# Patient Record
Sex: MALE | Race: WHITE | Employment: OTHER | URBAN - NONMETROPOLITAN AREA
[De-identification: names, ages, dates, MRNs, and addresses within clinical notes are randomized per-mention and may not be internally consistent; named-entity substitution may affect disease eponyms.]

---

## 2017-01-01 ENCOUNTER — APPOINTMENT (OUTPATIENT)
Dept: GENERAL RADIOLOGY | Facility: HOSPITAL | Age: 72
DRG: 602 | End: 2017-01-01
Attending: EMERGENCY MEDICINE
Payer: MEDICARE

## 2017-01-01 ENCOUNTER — HOSPITAL ENCOUNTER (INPATIENT)
Facility: HOSPITAL | Age: 72
LOS: 3 days | Discharge: HOME | DRG: 602 | End: 2017-12-19
Attending: EMERGENCY MEDICINE | Admitting: FAMILY MEDICINE
Payer: MEDICARE

## 2017-01-01 ENCOUNTER — TELEPHONE (OUTPATIENT)
Dept: CASE MANAGEMENT | Facility: HOSPITAL | Age: 72
End: 2017-01-01

## 2017-01-01 VITALS
SYSTOLIC BLOOD PRESSURE: 170 MMHG | HEIGHT: 71 IN | WEIGHT: 205 LBS | RESPIRATION RATE: 20 BRPM | DIASTOLIC BLOOD PRESSURE: 68 MMHG | TEMPERATURE: 98.6 F | BODY MASS INDEX: 28.7 KG/M2 | OXYGEN SATURATION: 91 %

## 2017-01-01 DIAGNOSIS — R41.0 DELIRIUM: ICD-10-CM

## 2017-01-01 DIAGNOSIS — I10 BENIGN ESSENTIAL HYPERTENSION: Primary | ICD-10-CM

## 2017-01-01 DIAGNOSIS — L03.116 CELLULITIS OF BOTH LOWER EXTREMITIES: ICD-10-CM

## 2017-01-01 DIAGNOSIS — G89.4 CHRONIC PAIN SYNDROME: ICD-10-CM

## 2017-01-01 DIAGNOSIS — L03.115 CELLULITIS OF BOTH LOWER EXTREMITIES: ICD-10-CM

## 2017-01-01 LAB
ALBUMIN SERPL-MCNC: 3.2 G/DL (ref 3.4–5)
ALBUMIN UR-MCNC: >600 MG/DL
ALP SERPL-CCNC: 96 U/L (ref 40–150)
ALT SERPL W P-5'-P-CCNC: 22 U/L (ref 0–70)
ANION GAP SERPL CALCULATED.3IONS-SCNC: 10 MMOL/L (ref 3–14)
ANION GAP SERPL CALCULATED.3IONS-SCNC: 12 MMOL/L (ref 3–14)
ANION GAP SERPL CALCULATED.3IONS-SCNC: 8 MMOL/L (ref 3–14)
APPEARANCE UR: CLEAR
AST SERPL W P-5'-P-CCNC: 20 U/L (ref 0–45)
BACTERIA #/AREA URNS HPF: ABNORMAL /HPF
BACTERIA SPEC CULT: NO GROWTH
BACTERIA SPEC CULT: NORMAL
BACTERIA SPEC CULT: NORMAL
BASOPHILS # BLD AUTO: 0.1 10E9/L (ref 0–0.2)
BASOPHILS NFR BLD AUTO: 0.5 %
BILIRUB SERPL-MCNC: 0.6 MG/DL (ref 0.2–1.3)
BILIRUB UR QL STRIP: ABNORMAL
BUN SERPL-MCNC: 20 MG/DL (ref 7–30)
BUN SERPL-MCNC: 27 MG/DL (ref 7–30)
BUN SERPL-MCNC: 27 MG/DL (ref 7–30)
CALCIUM SERPL-MCNC: 7.7 MG/DL (ref 8.5–10.1)
CALCIUM SERPL-MCNC: 8.1 MG/DL (ref 8.5–10.1)
CALCIUM SERPL-MCNC: 8.8 MG/DL (ref 8.5–10.1)
CHLORIDE SERPL-SCNC: 111 MMOL/L (ref 94–109)
CHLORIDE SERPL-SCNC: 111 MMOL/L (ref 94–109)
CHLORIDE SERPL-SCNC: 113 MMOL/L (ref 94–109)
CO2 SERPL-SCNC: 21 MMOL/L (ref 20–32)
CO2 SERPL-SCNC: 23 MMOL/L (ref 20–32)
CO2 SERPL-SCNC: 24 MMOL/L (ref 20–32)
COLOR UR AUTO: YELLOW
CREAT SERPL-MCNC: 0.94 MG/DL (ref 0.66–1.25)
CREAT SERPL-MCNC: 0.96 MG/DL (ref 0.66–1.25)
CREAT SERPL-MCNC: 1.06 MG/DL (ref 0.66–1.25)
CREAT SERPL-MCNC: 1.12 MG/DL (ref 0.66–1.25)
CRP SERPL-MCNC: 20.4 MG/L (ref 0–8)
DIFFERENTIAL METHOD BLD: ABNORMAL
EOSINOPHIL # BLD AUTO: 0 10E9/L (ref 0–0.7)
EOSINOPHIL NFR BLD AUTO: 0.2 %
ERYTHROCYTE [DISTWIDTH] IN BLOOD BY AUTOMATED COUNT: 15.8 % (ref 10–15)
ERYTHROCYTE [DISTWIDTH] IN BLOOD BY AUTOMATED COUNT: 15.9 % (ref 10–15)
ERYTHROCYTE [DISTWIDTH] IN BLOOD BY AUTOMATED COUNT: 16.3 % (ref 10–15)
ERYTHROCYTE [SEDIMENTATION RATE] IN BLOOD BY WESTERGREN METHOD: 11 MM/H (ref 0–20)
FLUAV+FLUBV AG SPEC QL: NEGATIVE
FLUAV+FLUBV AG SPEC QL: NEGATIVE
GFR SERPL CREATININE-BSD FRML MDRD: 64 ML/MIN/1.7M2
GFR SERPL CREATININE-BSD FRML MDRD: 68 ML/MIN/1.7M2
GFR SERPL CREATININE-BSD FRML MDRD: 77 ML/MIN/1.7M2
GFR SERPL CREATININE-BSD FRML MDRD: 79 ML/MIN/1.7M2
GLUCOSE SERPL-MCNC: 100 MG/DL (ref 70–99)
GLUCOSE SERPL-MCNC: 93 MG/DL (ref 70–99)
GLUCOSE SERPL-MCNC: 94 MG/DL (ref 70–99)
GLUCOSE UR STRIP-MCNC: NEGATIVE MG/DL
HCT VFR BLD AUTO: 37.4 % (ref 40–53)
HCT VFR BLD AUTO: 38.2 % (ref 40–53)
HCT VFR BLD AUTO: 44.8 % (ref 40–53)
HGB BLD-MCNC: 12.2 G/DL (ref 13.3–17.7)
HGB BLD-MCNC: 12.4 G/DL (ref 13.3–17.7)
HGB BLD-MCNC: 14.3 G/DL (ref 13.3–17.7)
HGB UR QL STRIP: ABNORMAL
IMM GRANULOCYTES # BLD: 0.1 10E9/L (ref 0–0.4)
IMM GRANULOCYTES NFR BLD: 0.4 %
INR PPP: 1.09 (ref 0.8–1.2)
INR PPP: 1.54 (ref 0.8–1.2)
INR PPP: 5.28 (ref 0.8–1.2)
INR PPP: 6.75 (ref 0.8–1.2)
KETONES UR STRIP-MCNC: 80 MG/DL
LACTATE SERPL-SCNC: 1.4 MMOL/L (ref 0.4–2)
LEUKOCYTE ESTERASE UR QL STRIP: NEGATIVE
LYMPHOCYTES # BLD AUTO: 1.3 10E9/L (ref 0.8–5.3)
LYMPHOCYTES NFR BLD AUTO: 8.8 %
Lab: NORMAL
Lab: NORMAL
MAGNESIUM SERPL-MCNC: 2.5 MG/DL (ref 1.6–2.3)
MCH RBC QN AUTO: 25.3 PG (ref 26.5–33)
MCH RBC QN AUTO: 25.5 PG (ref 26.5–33)
MCH RBC QN AUTO: 25.5 PG (ref 26.5–33)
MCHC RBC AUTO-ENTMCNC: 31.9 G/DL (ref 31.5–36.5)
MCHC RBC AUTO-ENTMCNC: 32.5 G/DL (ref 31.5–36.5)
MCHC RBC AUTO-ENTMCNC: 32.6 G/DL (ref 31.5–36.5)
MCV RBC AUTO: 78 FL (ref 78–100)
MCV RBC AUTO: 79 FL (ref 78–100)
MCV RBC AUTO: 79 FL (ref 78–100)
MONOCYTES # BLD AUTO: 1.4 10E9/L (ref 0–1.3)
MONOCYTES NFR BLD AUTO: 9.5 %
MUCOUS THREADS #/AREA URNS LPF: PRESENT /LPF
NEUTROPHILS # BLD AUTO: 11.9 10E9/L (ref 1.6–8.3)
NEUTROPHILS NFR BLD AUTO: 80.6 %
NITRATE UR QL: NEGATIVE
NRBC # BLD AUTO: 0 10*3/UL
NRBC BLD AUTO-RTO: 0 /100
NT-PROBNP SERPL-MCNC: 5374 PG/ML (ref 0–900)
PH UR STRIP: 6 PH (ref 4.7–8)
PLATELET # BLD AUTO: 170 10E9/L (ref 150–450)
PLATELET # BLD AUTO: 197 10E9/L (ref 150–450)
PLATELET # BLD AUTO: 279 10E9/L (ref 150–450)
POTASSIUM SERPL-SCNC: 2.9 MMOL/L (ref 3.4–5.3)
POTASSIUM SERPL-SCNC: 3.2 MMOL/L (ref 3.4–5.3)
POTASSIUM SERPL-SCNC: 3.6 MMOL/L (ref 3.4–5.3)
POTASSIUM SERPL-SCNC: 3.7 MMOL/L (ref 3.4–5.3)
POTASSIUM SERPL-SCNC: 3.9 MMOL/L (ref 3.4–5.3)
POTASSIUM SERPL-SCNC: 4.4 MMOL/L (ref 3.4–5.3)
PROT SERPL-MCNC: 6.6 G/DL (ref 6.8–8.8)
RBC # BLD AUTO: 4.78 10E12/L (ref 4.4–5.9)
RBC # BLD AUTO: 4.86 10E12/L (ref 4.4–5.9)
RBC # BLD AUTO: 5.65 10E12/L (ref 4.4–5.9)
RBC #/AREA URNS AUTO: 5 /HPF (ref 0–2)
SODIUM SERPL-SCNC: 142 MMOL/L (ref 133–144)
SODIUM SERPL-SCNC: 144 MMOL/L (ref 133–144)
SODIUM SERPL-SCNC: 147 MMOL/L (ref 133–144)
SOURCE: ABNORMAL
SP GR UR STRIP: 1.02 (ref 1–1.03)
SPECIMEN SOURCE: NORMAL
TROPONIN I SERPL-MCNC: 0.14 UG/L (ref 0–0.04)
TSH SERPL DL<=0.005 MIU/L-ACNC: 2.02 MU/L (ref 0.4–4)
UROBILINOGEN UR STRIP-MCNC: 2 MG/DL (ref 0–2)
VANCOMYCIN SERPL-MCNC: 27.4 MG/L
WBC # BLD AUTO: 10.7 10E9/L (ref 4–11)
WBC # BLD AUTO: 11.6 10E9/L (ref 4–11)
WBC # BLD AUTO: 14.8 10E9/L (ref 4–11)
WBC #/AREA URNS AUTO: 2 /HPF (ref 0–2)

## 2017-01-01 PROCEDURE — 36415 COLL VENOUS BLD VENIPUNCTURE: CPT | Performed by: FAMILY MEDICINE

## 2017-01-01 PROCEDURE — 85610 PROTHROMBIN TIME: CPT | Performed by: EMERGENCY MEDICINE

## 2017-01-01 PROCEDURE — 80048 BASIC METABOLIC PNL TOTAL CA: CPT | Performed by: FAMILY MEDICINE

## 2017-01-01 PROCEDURE — 12000000 ZZH R&B MED SURG/OB

## 2017-01-01 PROCEDURE — 85610 PROTHROMBIN TIME: CPT | Performed by: FAMILY MEDICINE

## 2017-01-01 PROCEDURE — 82565 ASSAY OF CREATININE: CPT | Performed by: FAMILY MEDICINE

## 2017-01-01 PROCEDURE — 25000128 H RX IP 250 OP 636: Performed by: FAMILY MEDICINE

## 2017-01-01 PROCEDURE — 83880 ASSAY OF NATRIURETIC PEPTIDE: CPT | Performed by: EMERGENCY MEDICINE

## 2017-01-01 PROCEDURE — 25000132 ZZH RX MED GY IP 250 OP 250 PS 637: Mod: GY | Performed by: FAMILY MEDICINE

## 2017-01-01 PROCEDURE — 80053 COMPREHEN METABOLIC PANEL: CPT | Performed by: EMERGENCY MEDICINE

## 2017-01-01 PROCEDURE — 87086 URINE CULTURE/COLONY COUNT: CPT | Performed by: EMERGENCY MEDICINE

## 2017-01-01 PROCEDURE — 87040 BLOOD CULTURE FOR BACTERIA: CPT | Performed by: EMERGENCY MEDICINE

## 2017-01-01 PROCEDURE — 36416 COLLJ CAPILLARY BLOOD SPEC: CPT | Performed by: FAMILY MEDICINE

## 2017-01-01 PROCEDURE — 84443 ASSAY THYROID STIM HORMONE: CPT | Performed by: EMERGENCY MEDICINE

## 2017-01-01 PROCEDURE — 96365 THER/PROPH/DIAG IV INF INIT: CPT

## 2017-01-01 PROCEDURE — 85652 RBC SED RATE AUTOMATED: CPT | Performed by: EMERGENCY MEDICINE

## 2017-01-01 PROCEDURE — 85027 COMPLETE CBC AUTOMATED: CPT | Performed by: FAMILY MEDICINE

## 2017-01-01 PROCEDURE — 94640 AIRWAY INHALATION TREATMENT: CPT

## 2017-01-01 PROCEDURE — 40000275 ZZH STATISTIC RCP TIME EA 10 MIN

## 2017-01-01 PROCEDURE — 40000893 ZZH STATISTIC PT IP EVAL DEFER: Performed by: PHYSICAL THERAPIST

## 2017-01-01 PROCEDURE — 25000125 ZZHC RX 250: Performed by: FAMILY MEDICINE

## 2017-01-01 PROCEDURE — 80202 ASSAY OF VANCOMYCIN: CPT | Performed by: FAMILY MEDICINE

## 2017-01-01 PROCEDURE — 83735 ASSAY OF MAGNESIUM: CPT | Performed by: EMERGENCY MEDICINE

## 2017-01-01 PROCEDURE — 85025 COMPLETE CBC W/AUTO DIFF WBC: CPT | Performed by: EMERGENCY MEDICINE

## 2017-01-01 PROCEDURE — 96367 TX/PROPH/DG ADDL SEQ IV INF: CPT

## 2017-01-01 PROCEDURE — 84132 ASSAY OF SERUM POTASSIUM: CPT | Performed by: FAMILY MEDICINE

## 2017-01-01 PROCEDURE — A9270 NON-COVERED ITEM OR SERVICE: HCPCS | Mod: GY | Performed by: FAMILY MEDICINE

## 2017-01-01 PROCEDURE — 87804 INFLUENZA ASSAY W/OPTIC: CPT | Performed by: EMERGENCY MEDICINE

## 2017-01-01 PROCEDURE — 86140 C-REACTIVE PROTEIN: CPT | Performed by: EMERGENCY MEDICINE

## 2017-01-01 PROCEDURE — 71010 XR CHEST PORT 1 VW: CPT | Mod: TC

## 2017-01-01 PROCEDURE — 99285 EMERGENCY DEPT VISIT HI MDM: CPT | Performed by: EMERGENCY MEDICINE

## 2017-01-01 PROCEDURE — 94640 AIRWAY INHALATION TREATMENT: CPT | Mod: 76

## 2017-01-01 PROCEDURE — 99285 EMERGENCY DEPT VISIT HI MDM: CPT | Mod: 25

## 2017-01-01 PROCEDURE — 84484 ASSAY OF TROPONIN QUANT: CPT | Performed by: EMERGENCY MEDICINE

## 2017-01-01 PROCEDURE — 36415 COLL VENOUS BLD VENIPUNCTURE: CPT | Performed by: EMERGENCY MEDICINE

## 2017-01-01 PROCEDURE — 25000128 H RX IP 250 OP 636: Performed by: EMERGENCY MEDICINE

## 2017-01-01 PROCEDURE — 84484 ASSAY OF TROPONIN QUANT: CPT | Performed by: FAMILY MEDICINE

## 2017-01-01 PROCEDURE — 96375 TX/PRO/DX INJ NEW DRUG ADDON: CPT

## 2017-01-01 PROCEDURE — 81001 URINALYSIS AUTO W/SCOPE: CPT | Performed by: EMERGENCY MEDICINE

## 2017-01-01 PROCEDURE — 83605 ASSAY OF LACTIC ACID: CPT | Performed by: EMERGENCY MEDICINE

## 2017-01-01 RX ORDER — POTASSIUM CL/LIDO/0.9 % NACL 10MEQ/0.1L
10 INTRAVENOUS SOLUTION, PIGGYBACK (ML) INTRAVENOUS
Status: DISCONTINUED | OUTPATIENT
Start: 2017-01-01 | End: 2017-01-01 | Stop reason: HOSPADM

## 2017-01-01 RX ORDER — CLINDAMYCIN PHOSPHATE 900 MG/50ML
900 INJECTION, SOLUTION INTRAVENOUS EVERY 8 HOURS
Status: DISCONTINUED | OUTPATIENT
Start: 2017-01-01 | End: 2017-01-01

## 2017-01-01 RX ORDER — CLINDAMYCIN HCL 300 MG
300 CAPSULE ORAL 3 TIMES DAILY
Qty: 21 CAPSULE | Refills: 0 | Status: SHIPPED | OUTPATIENT
Start: 2017-01-01 | End: 2017-01-01

## 2017-01-01 RX ORDER — LOSARTAN POTASSIUM 50 MG/1
100 TABLET ORAL DAILY
Status: DISCONTINUED | OUTPATIENT
Start: 2017-01-01 | End: 2017-01-01 | Stop reason: HOSPADM

## 2017-01-01 RX ORDER — WARFARIN SODIUM 2.5 MG/1
2.5 TABLET ORAL
Status: COMPLETED | OUTPATIENT
Start: 2017-01-01 | End: 2017-01-01

## 2017-01-01 RX ORDER — AMLODIPINE BESYLATE 10 MG/1
5 TABLET ORAL DAILY
Qty: 30 TABLET | Refills: 1 | Status: ON HOLD | OUTPATIENT
Start: 2017-01-01 | End: 2018-01-01

## 2017-01-01 RX ORDER — ONDANSETRON 4 MG/1
4 TABLET, ORALLY DISINTEGRATING ORAL EVERY 6 HOURS PRN
Status: DISCONTINUED | OUTPATIENT
Start: 2017-01-01 | End: 2017-01-01 | Stop reason: HOSPADM

## 2017-01-01 RX ORDER — MULTIPLE VITAMINS W/ MINERALS TAB 9MG-400MCG
1 TAB ORAL DAILY
Status: DISCONTINUED | OUTPATIENT
Start: 2017-01-01 | End: 2017-01-01 | Stop reason: HOSPADM

## 2017-01-01 RX ORDER — DIPHENHYDRAMINE HYDROCHLORIDE, ZINC ACETATE 2; .1 G/100G; G/100G
CREAM TOPICAL 3 TIMES DAILY PRN
Status: DISCONTINUED | OUTPATIENT
Start: 2017-01-01 | End: 2017-01-01 | Stop reason: HOSPADM

## 2017-01-01 RX ORDER — PHYTONADIONE 5 MG/1
10 TABLET ORAL ONCE
Status: COMPLETED | OUTPATIENT
Start: 2017-01-01 | End: 2017-01-01

## 2017-01-01 RX ORDER — SODIUM CHLORIDE 9 MG/ML
INJECTION, SOLUTION INTRAVENOUS CONTINUOUS
Status: DISCONTINUED | OUTPATIENT
Start: 2017-01-01 | End: 2017-01-01 | Stop reason: HOSPADM

## 2017-01-01 RX ORDER — POTASSIUM CHLORIDE 1.5 G/1.58G
20-40 POWDER, FOR SOLUTION ORAL
Status: DISCONTINUED | OUTPATIENT
Start: 2017-01-01 | End: 2017-01-01 | Stop reason: HOSPADM

## 2017-01-01 RX ORDER — METOPROLOL SUCCINATE 100 MG/1
100 TABLET, EXTENDED RELEASE ORAL DAILY
Status: DISCONTINUED | OUTPATIENT
Start: 2017-01-01 | End: 2017-01-01 | Stop reason: HOSPADM

## 2017-01-01 RX ORDER — ONDANSETRON 2 MG/ML
4 INJECTION INTRAMUSCULAR; INTRAVENOUS EVERY 6 HOURS PRN
Status: DISCONTINUED | OUTPATIENT
Start: 2017-01-01 | End: 2017-01-01 | Stop reason: HOSPADM

## 2017-01-01 RX ORDER — LISINOPRIL 10 MG/1
10 TABLET ORAL DAILY
Status: DISCONTINUED | OUTPATIENT
Start: 2017-01-01 | End: 2017-01-01

## 2017-01-01 RX ORDER — FLUTICASONE PROPIONATE 50 MCG
1-2 SPRAY, SUSPENSION (ML) NASAL DAILY
Status: DISCONTINUED | OUTPATIENT
Start: 2017-01-01 | End: 2017-01-01 | Stop reason: HOSPADM

## 2017-01-01 RX ORDER — POTASSIUM CHLORIDE 7.45 MG/ML
10 INJECTION INTRAVENOUS
Status: DISCONTINUED | OUTPATIENT
Start: 2017-01-01 | End: 2017-01-01 | Stop reason: HOSPADM

## 2017-01-01 RX ORDER — HYDROMORPHONE HYDROCHLORIDE 1 MG/ML
0.2 INJECTION, SOLUTION INTRAMUSCULAR; INTRAVENOUS; SUBCUTANEOUS
Status: COMPLETED | OUTPATIENT
Start: 2017-01-01 | End: 2017-01-01

## 2017-01-01 RX ORDER — POTASSIUM CHLORIDE 1500 MG/1
20-40 TABLET, EXTENDED RELEASE ORAL
Status: DISCONTINUED | OUTPATIENT
Start: 2017-01-01 | End: 2017-01-01 | Stop reason: HOSPADM

## 2017-01-01 RX ORDER — SODIUM CHLORIDE 9 MG/ML
INJECTION, SOLUTION INTRAVENOUS CONTINUOUS
Status: DISCONTINUED | OUTPATIENT
Start: 2017-01-01 | End: 2017-01-01

## 2017-01-01 RX ORDER — WARFARIN SODIUM 2.5 MG/1
2.5 TABLET ORAL
Status: DISCONTINUED | OUTPATIENT
Start: 2017-01-01 | End: 2017-01-01

## 2017-01-01 RX ORDER — LIDOCAINE 40 MG/G
CREAM TOPICAL
Status: DISCONTINUED | OUTPATIENT
Start: 2017-01-01 | End: 2017-01-01 | Stop reason: HOSPADM

## 2017-01-01 RX ORDER — TORSEMIDE 20 MG/1
20 TABLET ORAL DAILY
Status: DISCONTINUED | OUTPATIENT
Start: 2017-01-01 | End: 2017-01-01 | Stop reason: HOSPADM

## 2017-01-01 RX ORDER — AMLODIPINE BESYLATE 5 MG/1
5 TABLET ORAL DAILY
Status: DISCONTINUED | OUTPATIENT
Start: 2017-01-01 | End: 2017-01-01

## 2017-01-01 RX ORDER — ACETAMINOPHEN 325 MG/1
650 TABLET ORAL EVERY 6 HOURS PRN
Status: DISCONTINUED | OUTPATIENT
Start: 2017-01-01 | End: 2017-01-01 | Stop reason: HOSPADM

## 2017-01-01 RX ORDER — KETOROLAC TROMETHAMINE 15 MG/ML
15 INJECTION, SOLUTION INTRAMUSCULAR; INTRAVENOUS ONCE
Status: COMPLETED | OUTPATIENT
Start: 2017-01-01 | End: 2017-01-01

## 2017-01-01 RX ORDER — HALOPERIDOL 5 MG/ML
2 INJECTION INTRAMUSCULAR EVERY 6 HOURS PRN
Status: DISCONTINUED | OUTPATIENT
Start: 2017-01-01 | End: 2017-01-01 | Stop reason: HOSPADM

## 2017-01-01 RX ORDER — BACLOFEN 10 MG/1
10 TABLET ORAL 2 TIMES DAILY
Status: DISCONTINUED | OUTPATIENT
Start: 2017-01-01 | End: 2017-01-01 | Stop reason: HOSPADM

## 2017-01-01 RX ORDER — HALOPERIDOL 5 MG/ML
3 INJECTION INTRAMUSCULAR ONCE
Status: COMPLETED | OUTPATIENT
Start: 2017-01-01 | End: 2017-01-01

## 2017-01-01 RX ORDER — ALBUTEROL SULFATE 0.83 MG/ML
1 SOLUTION RESPIRATORY (INHALATION)
Status: DISCONTINUED | OUTPATIENT
Start: 2017-01-01 | End: 2017-01-01 | Stop reason: HOSPADM

## 2017-01-01 RX ORDER — SACCHAROMYCES BOULARDII 250 MG
250 CAPSULE ORAL 2 TIMES DAILY
Status: DISCONTINUED | OUTPATIENT
Start: 2017-01-01 | End: 2017-01-01 | Stop reason: HOSPADM

## 2017-01-01 RX ORDER — GABAPENTIN 100 MG/1
100 CAPSULE ORAL 3 TIMES DAILY
Status: DISCONTINUED | OUTPATIENT
Start: 2017-01-01 | End: 2017-01-01 | Stop reason: HOSPADM

## 2017-01-01 RX ORDER — NICOTINE 21 MG/24HR
1 PATCH, TRANSDERMAL 24 HOURS TRANSDERMAL DAILY
Status: DISCONTINUED | OUTPATIENT
Start: 2017-01-01 | End: 2017-01-01 | Stop reason: HOSPADM

## 2017-01-01 RX ORDER — DIPHENOXYLATE HCL/ATROPINE 2.5-.025MG
1 TABLET ORAL 4 TIMES DAILY PRN
Status: DISCONTINUED | OUTPATIENT
Start: 2017-01-01 | End: 2017-01-01 | Stop reason: HOSPADM

## 2017-01-01 RX ORDER — DIPHENHYDRAMINE HYDROCHLORIDE 50 MG/ML
25 INJECTION INTRAMUSCULAR; INTRAVENOUS ONCE
Status: COMPLETED | OUTPATIENT
Start: 2017-01-01 | End: 2017-01-01

## 2017-01-01 RX ORDER — AMLODIPINE BESYLATE 10 MG/1
10 TABLET ORAL DAILY
Status: DISCONTINUED | OUTPATIENT
Start: 2017-01-01 | End: 2017-01-01 | Stop reason: HOSPADM

## 2017-01-01 RX ORDER — LEVOTHYROXINE SODIUM 125 UG/1
125 TABLET ORAL
Status: DISCONTINUED | OUTPATIENT
Start: 2017-01-01 | End: 2017-01-01 | Stop reason: HOSPADM

## 2017-01-01 RX ORDER — HYDROXYZINE HYDROCHLORIDE 25 MG/1
50 TABLET, FILM COATED ORAL 3 TIMES DAILY PRN
Status: DISCONTINUED | OUTPATIENT
Start: 2017-01-01 | End: 2017-01-01 | Stop reason: HOSPADM

## 2017-01-01 RX ORDER — ZOLPIDEM TARTRATE 5 MG/1
10 TABLET ORAL
Status: DISCONTINUED | OUTPATIENT
Start: 2017-01-01 | End: 2017-01-01

## 2017-01-01 RX ORDER — ZOLPIDEM TARTRATE 5 MG/1
5 TABLET ORAL
Status: DISCONTINUED | OUTPATIENT
Start: 2017-01-01 | End: 2017-01-01 | Stop reason: HOSPADM

## 2017-01-01 RX ORDER — NALOXONE HYDROCHLORIDE 0.4 MG/ML
.1-.4 INJECTION, SOLUTION INTRAMUSCULAR; INTRAVENOUS; SUBCUTANEOUS
Status: DISCONTINUED | OUTPATIENT
Start: 2017-01-01 | End: 2017-01-01 | Stop reason: HOSPADM

## 2017-01-01 RX ORDER — DOCUSATE SODIUM 100 MG/1
100 CAPSULE, LIQUID FILLED ORAL 2 TIMES DAILY
Status: DISCONTINUED | OUTPATIENT
Start: 2017-01-01 | End: 2017-01-01 | Stop reason: HOSPADM

## 2017-01-01 RX ORDER — METOCLOPRAMIDE HYDROCHLORIDE 5 MG/ML
5 INJECTION INTRAMUSCULAR; INTRAVENOUS ONCE
Status: COMPLETED | OUTPATIENT
Start: 2017-01-01 | End: 2017-01-01

## 2017-01-01 RX ORDER — OXYCODONE HYDROCHLORIDE 5 MG/1
15 TABLET ORAL EVERY 6 HOURS PRN
Status: DISCONTINUED | OUTPATIENT
Start: 2017-01-01 | End: 2017-01-01 | Stop reason: HOSPADM

## 2017-01-01 RX ORDER — VANCOMYCIN HYDROCHLORIDE 1 G/200ML
1000 INJECTION, SOLUTION INTRAVENOUS ONCE
Status: COMPLETED | OUTPATIENT
Start: 2017-01-01 | End: 2017-01-01

## 2017-01-01 RX ORDER — ASPIRIN 81 MG/1
81 TABLET, CHEWABLE ORAL DAILY
Status: DISCONTINUED | OUTPATIENT
Start: 2017-01-01 | End: 2017-01-01 | Stop reason: HOSPADM

## 2017-01-01 RX ADMIN — ASPIRIN 81 MG 81 MG: 81 TABLET ORAL at 09:29

## 2017-01-01 RX ADMIN — HALOPERIDOL LACTATE 3 MG: 5 INJECTION, SOLUTION INTRAMUSCULAR at 10:15

## 2017-01-01 RX ADMIN — ACETAMINOPHEN 650 MG: 325 TABLET, FILM COATED ORAL at 09:39

## 2017-01-01 RX ADMIN — LOSARTAN POTASSIUM 100 MG: 50 TABLET, FILM COATED ORAL at 09:27

## 2017-01-01 RX ADMIN — SODIUM CHLORIDE: 9 INJECTION, SOLUTION INTRAVENOUS at 10:14

## 2017-01-01 RX ADMIN — CLINDAMYCIN PHOSPHATE 900 MG: 18 INJECTION, SOLUTION INTRAVENOUS at 13:40

## 2017-01-01 RX ADMIN — BACLOFEN 10 MG: 10 TABLET ORAL at 14:53

## 2017-01-01 RX ADMIN — SODIUM CHLORIDE, PRESERVATIVE FREE: 5 INJECTION INTRAVENOUS at 21:01

## 2017-01-01 RX ADMIN — OXYCODONE HYDROCHLORIDE 15 MG: 5 TABLET ORAL at 23:44

## 2017-01-01 RX ADMIN — Medication 0.2 MG: at 10:15

## 2017-01-01 RX ADMIN — OXYCODONE HYDROCHLORIDE 15 MG: 5 TABLET ORAL at 05:15

## 2017-01-01 RX ADMIN — DOCUSATE SODIUM 100 MG: 100 CAPSULE, LIQUID FILLED ORAL at 09:27

## 2017-01-01 RX ADMIN — LOSARTAN POTASSIUM 100 MG: 50 TABLET, FILM COATED ORAL at 09:11

## 2017-01-01 RX ADMIN — TORSEMIDE 20 MG: 20 TABLET ORAL at 09:11

## 2017-01-01 RX ADMIN — ZOLPIDEM TARTRATE 5 MG: 5 TABLET, FILM COATED ORAL at 23:44

## 2017-01-01 RX ADMIN — AMLODIPINE BESYLATE 10 MG: 10 TABLET ORAL at 09:12

## 2017-01-01 RX ADMIN — LOSARTAN POTASSIUM 100 MG: 50 TABLET, FILM COATED ORAL at 14:55

## 2017-01-01 RX ADMIN — METOPROLOL SUCCINATE 100 MG: 100 TABLET, EXTENDED RELEASE ORAL at 09:12

## 2017-01-01 RX ADMIN — METOPROLOL SUCCINATE 100 MG: 100 TABLET, EXTENDED RELEASE ORAL at 14:52

## 2017-01-01 RX ADMIN — Medication 250 MG: at 21:01

## 2017-01-01 RX ADMIN — BACLOFEN 10 MG: 10 TABLET ORAL at 09:11

## 2017-01-01 RX ADMIN — GABAPENTIN 100 MG: 100 CAPSULE ORAL at 08:00

## 2017-01-01 RX ADMIN — OXYCODONE HYDROCHLORIDE 15 MG: 5 TABLET ORAL at 16:16

## 2017-01-01 RX ADMIN — CLINDAMYCIN PHOSPHATE 900 MG: 18 INJECTION, SOLUTION INTRAVENOUS at 06:01

## 2017-01-01 RX ADMIN — AMLODIPINE BESYLATE 5 MG: 5 TABLET ORAL at 09:27

## 2017-01-01 RX ADMIN — OXYCODONE HYDROCHLORIDE 15 MG: 5 TABLET ORAL at 11:22

## 2017-01-01 RX ADMIN — LOSARTAN POTASSIUM 100 MG: 50 TABLET, FILM COATED ORAL at 08:00

## 2017-01-01 RX ADMIN — LISINOPRIL 10 MG: 10 TABLET ORAL at 14:56

## 2017-01-01 RX ADMIN — KETOROLAC TROMETHAMINE 15 MG: 15 INJECTION, SOLUTION INTRAMUSCULAR; INTRAVENOUS at 10:16

## 2017-01-01 RX ADMIN — FLUTICASONE PROPIONATE 2 SPRAY: 50 SPRAY, METERED NASAL at 08:15

## 2017-01-01 RX ADMIN — VANCOMYCIN HYDROCHLORIDE 750 MG: 1 INJECTION, POWDER, LYOPHILIZED, FOR SOLUTION INTRAVENOUS at 14:50

## 2017-01-01 RX ADMIN — BACLOFEN 10 MG: 10 TABLET ORAL at 20:07

## 2017-01-01 RX ADMIN — VANCOMYCIN HYDROCHLORIDE 1750 MG: 1 INJECTION, POWDER, LYOPHILIZED, FOR SOLUTION INTRAVENOUS at 02:43

## 2017-01-01 RX ADMIN — METOCLOPRAMIDE 5 MG: 5 INJECTION, SOLUTION INTRAMUSCULAR; INTRAVENOUS at 10:16

## 2017-01-01 RX ADMIN — MULTIPLE VITAMINS W/ MINERALS TAB 1 TABLET: TAB at 09:12

## 2017-01-01 RX ADMIN — ALBUTEROL SULFATE 2.5 MG: 2.5 SOLUTION RESPIRATORY (INHALATION) at 12:44

## 2017-01-01 RX ADMIN — BACLOFEN 10 MG: 10 TABLET ORAL at 20:30

## 2017-01-01 RX ADMIN — METOPROLOL SUCCINATE 100 MG: 100 TABLET, EXTENDED RELEASE ORAL at 08:00

## 2017-01-01 RX ADMIN — FLUTICASONE PROPIONATE 2 SPRAY: 50 SPRAY, METERED NASAL at 11:27

## 2017-01-01 RX ADMIN — CLINDAMYCIN PHOSPHATE 900 MG: 18 INJECTION, SOLUTION INTRAVENOUS at 05:09

## 2017-01-01 RX ADMIN — ZOLPIDEM TARTRATE 5 MG: 5 TABLET, FILM COATED ORAL at 01:53

## 2017-01-01 RX ADMIN — BACLOFEN 10 MG: 10 TABLET ORAL at 21:01

## 2017-01-01 RX ADMIN — GABAPENTIN 100 MG: 100 CAPSULE ORAL at 20:29

## 2017-01-01 RX ADMIN — CLINDAMYCIN PHOSPHATE 900 MG: 18 INJECTION, SOLUTION INTRAVENOUS at 21:01

## 2017-01-01 RX ADMIN — SODIUM CHLORIDE, PRESERVATIVE FREE: 5 INJECTION INTRAVENOUS at 13:15

## 2017-01-01 RX ADMIN — Medication 250 MG: at 14:57

## 2017-01-01 RX ADMIN — Medication 250 MG: at 20:07

## 2017-01-01 RX ADMIN — OXYCODONE HYDROCHLORIDE 15 MG: 5 TABLET ORAL at 05:40

## 2017-01-01 RX ADMIN — METOPROLOL SUCCINATE 100 MG: 100 TABLET, EXTENDED RELEASE ORAL at 09:27

## 2017-01-01 RX ADMIN — FLUTICASONE PROPIONATE 2 SPRAY: 50 SPRAY, METERED NASAL at 09:13

## 2017-01-01 RX ADMIN — SODIUM CHLORIDE, PRESERVATIVE FREE: 5 INJECTION INTRAVENOUS at 23:55

## 2017-01-01 RX ADMIN — SODIUM CHLORIDE 3 G: 9 INJECTION, SOLUTION INTRAVENOUS at 12:02

## 2017-01-01 RX ADMIN — Medication 250 MG: at 20:29

## 2017-01-01 RX ADMIN — Medication 250 MG: at 09:11

## 2017-01-01 RX ADMIN — Medication 250 MG: at 08:00

## 2017-01-01 RX ADMIN — ASPIRIN 81 MG 81 MG: 81 TABLET ORAL at 14:55

## 2017-01-01 RX ADMIN — BACLOFEN 10 MG: 10 TABLET ORAL at 09:27

## 2017-01-01 RX ADMIN — ALBUTEROL SULFATE 2.5 MG: 2.5 SOLUTION RESPIRATORY (INHALATION) at 17:09

## 2017-01-01 RX ADMIN — OXYCODONE HYDROCHLORIDE 15 MG: 5 TABLET ORAL at 09:17

## 2017-01-01 RX ADMIN — POTASSIUM CHLORIDE 20 MEQ: 1.5 POWDER, FOR SOLUTION ORAL at 10:58

## 2017-01-01 RX ADMIN — GABAPENTIN 100 MG: 100 CAPSULE ORAL at 09:29

## 2017-01-01 RX ADMIN — TORSEMIDE 20 MG: 20 TABLET ORAL at 08:00

## 2017-01-01 RX ADMIN — GABAPENTIN 100 MG: 100 CAPSULE ORAL at 21:01

## 2017-01-01 RX ADMIN — ASPIRIN 81 MG 81 MG: 81 TABLET ORAL at 09:11

## 2017-01-01 RX ADMIN — WARFARIN SODIUM 2.5 MG: 2.5 TABLET ORAL at 18:30

## 2017-01-01 RX ADMIN — SODIUM CHLORIDE 3 G: 9 INJECTION, SOLUTION INTRAVENOUS at 17:52

## 2017-01-01 RX ADMIN — CLINDAMYCIN PHOSPHATE 900 MG: 18 INJECTION, SOLUTION INTRAVENOUS at 21:23

## 2017-01-01 RX ADMIN — CLINDAMYCIN PHOSPHATE 900 MG: 18 INJECTION, SOLUTION INTRAVENOUS at 13:29

## 2017-01-01 RX ADMIN — MULTIPLE VITAMINS W/ MINERALS TAB 1 TABLET: TAB at 09:27

## 2017-01-01 RX ADMIN — AMLODIPINE BESYLATE 10 MG: 10 TABLET ORAL at 08:00

## 2017-01-01 RX ADMIN — POTASSIUM CHLORIDE 20 MEQ: 1.5 POWDER, FOR SOLUTION ORAL at 09:11

## 2017-01-01 RX ADMIN — DIPHENHYDRAMINE HYDROCHLORIDE 25 MG: 50 INJECTION, SOLUTION INTRAMUSCULAR; INTRAVENOUS at 10:16

## 2017-01-01 RX ADMIN — TORSEMIDE 20 MG: 20 TABLET ORAL at 14:53

## 2017-01-01 RX ADMIN — GABAPENTIN 100 MG: 100 CAPSULE ORAL at 13:12

## 2017-01-01 RX ADMIN — OXYCODONE HYDROCHLORIDE 15 MG: 5 TABLET ORAL at 11:33

## 2017-01-01 RX ADMIN — VANCOMYCIN HYDROCHLORIDE 1000 MG: 1 INJECTION, SOLUTION INTRAVENOUS at 10:58

## 2017-01-01 RX ADMIN — ASPIRIN 81 MG 81 MG: 81 TABLET ORAL at 08:00

## 2017-01-01 RX ADMIN — NICOTINE 1 PATCH: 21 PATCH, EXTENDED RELEASE TRANSDERMAL at 09:23

## 2017-01-01 RX ADMIN — GABAPENTIN 100 MG: 100 CAPSULE ORAL at 13:29

## 2017-01-01 RX ADMIN — Medication 250 MG: at 09:29

## 2017-01-01 RX ADMIN — POTASSIUM CHLORIDE 40 MEQ: 1.5 POWDER, FOR SOLUTION ORAL at 11:22

## 2017-01-01 RX ADMIN — DOCUSATE SODIUM 100 MG: 100 CAPSULE, LIQUID FILLED ORAL at 14:56

## 2017-01-01 RX ADMIN — GABAPENTIN 100 MG: 100 CAPSULE ORAL at 14:52

## 2017-01-01 RX ADMIN — SODIUM CHLORIDE, PRESERVATIVE FREE: 5 INJECTION INTRAVENOUS at 09:22

## 2017-01-01 RX ADMIN — FLUTICASONE PROPIONATE 1 SPRAY: 50 SPRAY, METERED NASAL at 06:13

## 2017-01-01 RX ADMIN — FLUTICASONE FUROATE AND VILANTEROL TRIFENATATE 1 PUFF: 200; 25 POWDER RESPIRATORY (INHALATION) at 07:17

## 2017-01-01 RX ADMIN — LEVOTHYROXINE SODIUM 125 MCG: 125 TABLET ORAL at 07:42

## 2017-01-01 RX ADMIN — LEVOTHYROXINE SODIUM 125 MCG: 125 TABLET ORAL at 05:40

## 2017-01-01 RX ADMIN — VANCOMYCIN HYDROCHLORIDE 1750 MG: 1 INJECTION, POWDER, LYOPHILIZED, FOR SOLUTION INTRAVENOUS at 09:32

## 2017-01-01 RX ADMIN — BACLOFEN 10 MG: 10 TABLET ORAL at 08:00

## 2017-01-01 RX ADMIN — OXYCODONE HYDROCHLORIDE 15 MG: 5 TABLET ORAL at 02:00

## 2017-01-01 RX ADMIN — MULTIPLE VITAMINS W/ MINERALS TAB 1 TABLET: TAB at 20:07

## 2017-01-01 RX ADMIN — TORSEMIDE 20 MG: 20 TABLET ORAL at 09:29

## 2017-01-01 RX ADMIN — GABAPENTIN 100 MG: 100 CAPSULE ORAL at 09:12

## 2017-01-01 RX ADMIN — OXYCODONE HYDROCHLORIDE 15 MG: 5 TABLET ORAL at 16:25

## 2017-01-01 RX ADMIN — POTASSIUM CHLORIDE 40 MEQ: 1.5 POWDER, FOR SOLUTION ORAL at 09:26

## 2017-01-01 RX ADMIN — VANCOMYCIN HYDROCHLORIDE 1750 MG: 1 INJECTION, POWDER, LYOPHILIZED, FOR SOLUTION INTRAVENOUS at 03:39

## 2017-01-01 RX ADMIN — FLUTICASONE FUROATE AND VILANTEROL TRIFENATATE 1 PUFF: 200; 25 POWDER RESPIRATORY (INHALATION) at 08:16

## 2017-01-01 RX ADMIN — NICOTINE 1 PATCH: 21 PATCH, EXTENDED RELEASE TRANSDERMAL at 08:00

## 2017-01-01 RX ADMIN — MULTIPLE VITAMINS W/ MINERALS TAB 1 TABLET: TAB at 08:00

## 2017-01-01 RX ADMIN — NICOTINE 1 PATCH: 21 PATCH, EXTENDED RELEASE TRANSDERMAL at 11:22

## 2017-01-01 RX ADMIN — GABAPENTIN 100 MG: 100 CAPSULE ORAL at 20:07

## 2017-01-01 RX ADMIN — PHYTONADIONE 10 MG: 5 TABLET ORAL at 09:27

## 2017-01-01 RX ADMIN — LEVOTHYROXINE SODIUM 125 MCG: 125 TABLET ORAL at 11:28

## 2017-01-01 RX ADMIN — DOCUSATE SODIUM 100 MG: 100 CAPSULE, LIQUID FILLED ORAL at 08:00

## 2017-01-01 RX ADMIN — SODIUM CHLORIDE, PRESERVATIVE FREE: 5 INJECTION INTRAVENOUS at 09:32

## 2017-01-01 ASSESSMENT — PAIN DESCRIPTION - DESCRIPTORS
DESCRIPTORS: ACHING

## 2017-01-10 ENCOUNTER — MEDICAL CORRESPONDENCE (OUTPATIENT)
Dept: HEALTH INFORMATION MANAGEMENT | Facility: HOSPITAL | Age: 72
End: 2017-01-10

## 2017-01-10 ENCOUNTER — HOSPITAL ENCOUNTER (EMERGENCY)
Facility: HOSPITAL | Age: 72
Discharge: HOME | End: 2017-01-10
Attending: PHYSICIAN ASSISTANT | Admitting: PHYSICIAN ASSISTANT
Payer: MEDICARE

## 2017-01-10 VITALS
OXYGEN SATURATION: 92 % | DIASTOLIC BLOOD PRESSURE: 78 MMHG | TEMPERATURE: 98.2 F | HEART RATE: 61 BPM | SYSTOLIC BLOOD PRESSURE: 178 MMHG | RESPIRATION RATE: 18 BRPM

## 2017-01-10 DIAGNOSIS — R09.81 NASAL CONGESTION: Primary | ICD-10-CM

## 2017-01-10 PROCEDURE — 99283 EMERGENCY DEPT VISIT LOW MDM: CPT

## 2017-01-10 PROCEDURE — 99284 EMERGENCY DEPT VISIT MOD MDM: CPT | Performed by: PHYSICIAN ASSISTANT

## 2017-01-10 PROCEDURE — A9270 NON-COVERED ITEM OR SERVICE: HCPCS | Mod: GY | Performed by: PHYSICIAN ASSISTANT

## 2017-01-10 PROCEDURE — 25000132 ZZH RX MED GY IP 250 OP 250 PS 637: Mod: GY | Performed by: PHYSICIAN ASSISTANT

## 2017-01-10 RX ORDER — FLUTICASONE PROPIONATE 50 MCG
2 SPRAY, SUSPENSION (ML) NASAL 2 TIMES DAILY
Qty: 1 BOTTLE | Refills: 0 | Status: SHIPPED | OUTPATIENT
Start: 2017-01-10 | End: 2018-01-01

## 2017-01-10 RX ORDER — OXYCODONE HYDROCHLORIDE 5 MG/1
5 TABLET ORAL ONCE
Status: COMPLETED | OUTPATIENT
Start: 2017-01-10 | End: 2017-01-10

## 2017-01-10 RX ADMIN — OXYCODONE HYDROCHLORIDE 5 MG: 5 TABLET ORAL at 13:53

## 2017-01-10 NOTE — ED NOTES
"Pt brought in from home where he called ems due to having difficulty breathing, when they arrived pt had sats 98% on room air, was sitting in wheelchair with head on chest and once head was positioned to open airway pt stated no more sob. Pt states has been having nasal congestion, \"couldn't breath out of either nostril but now only left nostril is plugged\". Refuses to hoave monitors on other than sat and bp. Is a bam lift at home.  "

## 2017-01-10 NOTE — ED NOTES
Pt alert and answering questions appropriately. Notes here for visit due to nasal congestion. Refusing x-rays labs.

## 2017-01-10 NOTE — DISCHARGE INSTRUCTIONS
Use the Flonase as prescribed for your nasal congestion. Return here with new/worsening symptoms.

## 2017-01-10 NOTE — ED AVS SNAPSHOT
[facility]    [address] [address]    Phone:  [phone]                                       Seun Emanuel   MRN: [id number]    Department:  [facility]   Date of Visit:  1/10/2017           After Visit Summary Signature Page     I have received my discharge instructions, and my questions have been answered. I have discussed any challenges I see with this plan with the nurse or doctor.    ..........................................................................................................................................  Patient/Patient Representative Signature      ..........................................................................................................................................  Patient Representative Print Name and Relationship to Patient    ..................................................               ................................................  Date                                            Time    ..........................................................................................................................................  Reviewed by Signature/Title    ...................................................              ..............................................  Date                                                            Time

## 2017-01-10 NOTE — ED AVS SNAPSHOT
[facility]    [address] [address]    Phone:  [phone]                                       Seun Emanuel   MRN: [id number]    Department:  [facility]   Date of Visit:  1/10/2017           Patient Information     Date Of Birth          1945        Your diagnoses for this visit were:     Nasal congestion        You were seen by Cecelia Zuleta PA-C.      Follow-up Information     Follow up with Herbie Peter DO In 1 week.    Specialty:  Family Practice    Why:  As needed    Contact information:    [facility]  [address] [facility] [address]  [phone]          Discharge Instructions       Use the Flonase as prescribed for your nasal congestion. Return here with new/worsening symptoms.        Review of your medicines      Our records show that you are taking the medicines listed below. If these are incorrect, please call your family doctor or clinic.        Dose / Directions Last dose taken    ACETAMINOPHEN PO   Dose:  650 mg        Take 650 mg by mouth every 6 hours as needed for pain   Refills:  0        * ALBUTEROL INHALER 17GM   Dose:  2 puff        Inhale 2 puffs into the lungs every 4 hours as needed This product no longer available   Refills:  0        * albuterol (2.5 MG/3ML) 0.083% neb solution   Dose:  1 vial        Take 1 vial by nebulization every 2 hours as needed for shortness of breath / dyspnea or wheezing   Refills:  0        ASPIRIN PO   Dose:  81 mg        Take 81 mg by mouth daily   Refills:  0        BACLOFEN PO   Dose:  10 mg        Take 10 mg by mouth 2 times daily   Refills:  0        COLCHICINE PO   Dose:  0.6 mg        Take 0.6 mg by mouth daily as needed   Refills:  0        diphenhydrAMINE-zinc acetate cream   Commonly known as:  BENADRYL   Dose:  2 g        Apply 2 g topically 3 times daily as needed for itching (for itching legs)   Refills:  0        DOCUSATE SODIUM PO   Dose:  100 mg        Take  100 mg by mouth 2 times daily   Refills:  0        fentaNYL 100 mcg/hr 72 hr patch   Commonly known as:  DURAGESIC   Dose:  1 patch        Place 1 patch onto the skin every 72 hours   Refills:  0        fluticasone-salmeterol 250-50 MCG/DOSE diskus inhaler   Commonly known as:  ADVAIR   Dose:  1 puff        Inhale 1 puff into the lungs 2 times daily   Refills:  0        haloperidol 5 MG tablet   Commonly known as:  HALDOL   Dose:  5 mg        Take 1 tablet (5 mg) by mouth every 6 hours as needed for agitation   Refills:  0        HYDROcodone-acetaminophen  MG per tablet   Commonly known as:  NORCO   Dose:  1 tablet   Quantity:  60 tablet        Take 1 tablet by mouth every 6 hours as needed for moderate to severe pain   Refills:  0        HYDROXYZINE HCL PO   Dose:  50 mg        Take 50 mg by mouth 3 times daily as needed for itching   Refills:  0        lidocaine 2 % topical gel   Commonly known as:  XYLOCAINE   Dose:  1 Tube        Apply 1 Tube topically as needed for moderate pain (apply to itching legs 3-4 times daily)   Refills:  0        lisinopril 10 MG tablet   Commonly known as:  PRINIVIL/ZESTRIL   Dose:  10 mg   Quantity:  30 tablet        Take 1 tablet (10 mg) by mouth daily   Refills:  0        LOSARTAN POTASSIUM PO   Dose:  100 mg        Take 100 mg by mouth daily   Refills:  0        METOPROLOL SUCCINATE ER PO   Dose:  100 mg        Take 100 mg by mouth daily   Refills:  0        NEURONTIN PO   Dose:  100 mg        Take 100 mg by mouth 3 times daily   Refills:  0        NEXIUM PO   Dose:  40 mg        Take 40 mg by mouth daily   Refills:  0        nicotine 21 MG/24HR 24 hr patch   Commonly known as:  NICODERM CQ   Dose:  1 patch   Quantity:  30 patch        Place 1 patch onto the skin every 24 hours   Refills:  0        * order for DME   Quantity:  1 Device        Equipment being ordered: Other: *Neb machine** Treatment Diagnosis: *COPD**   Refills:  0        * order for DME   Quantity:  1 Device         Equipment being ordered: Bed Cradle   Refills:  0        * order for DME   Quantity:  1 Package        Equipment being ordered: Other: **Rook boots for chronic stasis dermatitis and ulcers.* Treatment Diagnosis: *CKD3 , chronic edema with ulcers**   Refills:  2        order for DME   Quantity:  1 Device        Equipment being ordered: Other: rollo cushion and arm sling Treatment Diagnosis: stage 2 pressure sores and left should pain and OA of ac joint   Refills:  0        * OXYCODONE HCL PO   Dose:  10 mg        Take 10 mg by mouth every 8 hours as needed   Refills:  0        * OXYCODONE HCL PO   Dose:  15 mg        Take 15 mg by mouth every 8 hours as needed   Refills:  0        saccharomyces boulardii 250 MG capsule   Commonly known as:  FLORASTOR   Dose:  250 mg   Quantity:  14 capsule        Take 1 capsule (250 mg) by mouth 2 times daily   Refills:  0        SYNTHROID PO   Dose:  125 mcg        Take 125 mcg by mouth daily   Refills:  0        TORSEMIDE PO   Dose:  20 mg        Take 20 mg by mouth daily   Refills:  0        warfarin 2.5 MG tablet   Commonly known as:  COUMADIN   Dose:  2.5 mg   Quantity:  30 tablet        Take 1 tablet (2.5 mg) by mouth daily Tuesday, Thursday   Refills:  1        ZOLPIDEM TARTRATE PO   Dose:  10 mg        Take 10 mg by mouth nightly as needed   Refills:  0        * Notice:  This list has 7 medication(s) that are the same as other medications prescribed for you. Read the directions carefully, and ask your doctor or other care provider to review them with you.      ASK your doctor about these medications        Dose / Directions Last dose taken    * fluticasone 50 MCG/ACT spray   Commonly known as:  FLONASE   Dose:  1-2 spray   What changed:  Another medication with the same name was added. Make sure you understand how and when to take each.   Quantity:  1 Package   Ask about: Which instructions should I use?        Spray 1-2 sprays into both nostrils daily   Refills:  0     "    * fluticasone 50 MCG/ACT spray   Commonly known as:  FLONASE   Dose:  2 spray   What changed:  You were already taking a medication with the same name, and this prescription was added. Make sure you understand how and when to take each.   Quantity:  1 Bottle   Ask about: Which instructions should I use?        Spray 2 sprays into both nostrils 2 times daily   Refills:  0        * Notice:  This list has 2 medication(s) that are the same as other medications prescribed for you. Read the directions carefully, and ask your doctor or other care provider to review them with you.            Prescriptions were sent or printed at these locations (1 Prescription)                   [id number] - [place], [place] - [address]   [address] [facility], [place] [address]    Telephone:  [phone]   Fax:  [phone]   Hours:                  E-Prescribed (1 of 1)         fluticasone (FLONASE) 50 MCG/ACT spray                Orders Needing Specimen Collection     None      Pending Results     No orders found from 1/9/2017 to 1/11/2017.            Pending Culture Results     No orders found from 1/9/2017 to 1/11/2017.            Thank you for choosing [facility]       Thank you for choosing [facility] for your care. Our goal is always to provide you with excellent care. Hearing back from our patients is one way we can continue to improve our services. Please take a few minutes to complete the written survey that you may receive in the mail after you visit with us. Thank you!        [employer] Information     [employer] lets you send messages to your doctor, view your test results, renew your prescriptions, schedule appointments and more. To sign up, go to [email or web address] . Click on \"Log in\" on the left side of the screen, which will take you to the Welcome page. Then click on \"Sign up Now\" on the right side of the page.     You will be asked to enter the access code listed below, as well as some personal information. " Please follow the directions to create your username and password.     Your access code is: [email or web address]  Expires: 4/10/2017  1:32 PM     Your access code will  in 90 days. If you need help or a new code, please call your [facility] clinic or [phone].        Care EveryWhere ID     This is your Care EveryWhere ID. This could be used by other organizations to access your [facility] medical records  [id number]        After Visit Summary       This is your record. Keep this with you and show to your community pharmacist(s) and doctor(s) at your next visit.

## 2017-01-12 ASSESSMENT — ENCOUNTER SYMPTOMS
VOMITING: 0
CHEST TIGHTNESS: 0
SHORTNESS OF BREATH: 0
ABDOMINAL PAIN: 0
SORE THROAT: 0
CHILLS: 0
WHEEZING: 0
NAUSEA: 0
FEVER: 0

## 2017-01-12 NOTE — ED PROVIDER NOTES
History     Chief Complaint   Patient presents with     Nasal Congestion     pt was having sob at home when not positioned with head up, once was repositioned by ems, was better     HPI  Seun Emanuel is a 71 year old male who presents via ambulance from home with nasal congestion. His wife also wants me to check out his left leg as she feels it has been weeping more clear fluid today. He is currently of Ceftin for cellulitis of that leg. No fevers.     I have reviewed the Medications, Allergies, Past Medical and Surgical History, and Social History in the Epic system.    Review of Systems   Constitutional: Negative for fever and chills.   HENT: Positive for congestion. Negative for sore throat.    Respiratory: Negative for chest tightness, shortness of breath and wheezing.    Cardiovascular: Negative for chest pain.   Gastrointestinal: Negative for nausea, vomiting and abdominal pain.   Skin: Negative.  Negative for rash.   All other systems reviewed and are negative.     Past Medical History:   Past Medical History   Diagnosis Date     Hypertension      Hypothyroidism      MRSA (methicillin resistant Staphylococcus aureus) infection      COPD (chronic obstructive pulmonary disease) (H)      Peripheral vascular disease (H)      DVT (deep venous thrombosis) (H)      Osteoarthritis      Chronic venous insufficiency      CVA (cerebral vascular accident) (H)      Ulcers of both lower legs (H)      Bilateral lower leg cellulitis      Asthma      CKD (chronic kidney disease), stage III      Gout        Past Surgical History   Procedure Laterality Date     Amputation Left 03/07/2012     left great toe     Cholecystectomy  1994     Tonsillectomy       Irrigation and debridement lower extremity, combined  2010     Both legs     Hip surgery Left 01/28/2015       Social History     Social History     Marital Status:      Spouse Name: Kimmy     Number of Children: 3     Years of Education: N/A     Occupational  History     Retired [employer]     Social History Main Topics     Smoking status: Current Every Day Smoker -- 1.00 packs/day for 20 years     Smokeless tobacco: Never Used      Comment: Recently restarted     Alcohol Use: No     Drug Use: No     Sexual Activity: Not on file     Other Topics Concern     Not on file     Social History Narrative       Discharge Medication List as of 1/10/2017  1:32 PM      START taking these medications    Details   !! fluticasone (FLONASE) 50 MCG/ACT spray Spray 2 sprays into both nostrils 2 times daily, Disp-1 Bottle, R-0, E-Prescribe       !! - Potential duplicate medications found. Please discuss with provider.      CONTINUE these medications which have NOT CHANGED    Details   LOSARTAN POTASSIUM PO Take 100 mg by mouth daily, Historical      warfarin (COUMADIN) 2.5 MG tablet Take 1 tablet (2.5 mg) by mouth daily Tuesday, Thursday, Disp-30 tablet, R-1, Transitional      ASPIRIN PO Take 81 mg by mouth daily, Historical      Esomeprazole Magnesium (NEXIUM PO) Take 40 mg by mouth daily, Historical      fentaNYL (DURAGESIC) 100 mcg/hr patch 72 hr Place 1 patch onto the skin every 72 hours, Historical      DOCUSATE SODIUM PO Take 100 mg by mouth 2 times daily, Historical      lisinopril (PRINIVIL,ZESTRIL) 10 MG tablet Take 1 tablet (10 mg) by mouth daily, Disp-30 tablet, R-0, Normal      !! fluticasone (FLONASE) 50 MCG/ACT nasal spray Spray 1-2 sprays into both nostrils daily, Disp-1 Package, R-0, Normal      Levothyroxine Sodium (SYNTHROID PO) Take 125 mcg by mouth daily , Historical      METOPROLOL SUCCINATE ER PO Take 100 mg by mouth daily, Historical      TORSEMIDE PO Take 20 mg by mouth daily , Historical      BACLOFEN PO Take 10 mg by mouth 2 times daily , Historical      Gabapentin (NEURONTIN PO) Take 100 mg by mouth 3 times daily , Historical      !! OXYCODONE HCL PO Take 10 mg by mouth every 8 hours as needed, Historical      !! OXYCODONE HCL PO Take 15 mg by mouth every 8  hours as needed, Historical      haloperidol (HALDOL) 5 MG tablet Take 1 tablet (5 mg) by mouth every 6 hours as needed for agitation, Transitional      diphenhydrAMINE-zinc acetate (BENADRYL) cream Apply 2 g topically 3 times daily as needed for itching (for itching legs)Historical      lidocaine (XYLOCAINE) 2 % jelly Apply 1 Tube topically as needed for moderate pain (apply to itching legs 3-4 times daily)Historical      saccharomyces boulardii (FLORASTOR) 250 MG capsule Take 1 capsule (250 mg) by mouth 2 times daily, Disp-14 capsule, R-0, Fax      !! order for DME Equipment being ordered: Other: rollo cushion and arm sling  Treatment Diagnosis: stage 2 pressure sores and left should pain and OA of ac jointDisp-1 Device, R-0, ADDISON, Local Print      HYDROcodone-acetaminophen (NORCO)  MG per tablet Take 1 tablet by mouth every 6 hours as needed for moderate to severe pain, Disp-60 tablet, R-0, Local Print      ZOLPIDEM TARTRATE PO Take 10 mg by mouth nightly as needed , Historical      nicotine (NICODERM CQ) 21 MG/24HR patch 2h hr Place 1 patch onto the skin every 24 hours, Disp-30 patch, R-0, Fax      ACETAMINOPHEN PO Take 650 mg by mouth every 6 hours as needed for pain, Historical      albuterol (2.5 MG/3ML) 0.083% nebulizer solution Take 1 vial by nebulization every 2 hours as needed for shortness of breath / dyspnea or wheezing, Historical      HYDROXYZINE HCL PO Take 50 mg by mouth 3 times daily as needed for itching, Historical      !! ORDER FOR DME Equipment being ordered: Other: **Rook boots for chronic stasis dermatitis and ulcers.*  Treatment Diagnosis: *CKD3 , chronic edema with ulcers**Disp-1 Package, R-2, Normal      !! ORDER FOR DME Equipment being ordered: Bed CradleDisp-1 Device, R-0, Normal      COLCHICINE PO Take 0.6 mg by mouth daily as needed, Historical      fluticasone-salmeterol (ADVAIR) 250-50 MCG/DOSE diskus inhaler Inhale 1 puff into the lungs 2 times daily, Historical      !! ORDER  FOR DME Equipment being ordered: Other: *Neb machine**  Treatment Diagnosis: *COPD**Disp-1 Device, R-0, Normal      ALBUTEROL INHALER 17GM Inhale 2 puffs into the lungs every 4 hours as needed This product no longer available, Historical       !! - Potential duplicate medications found. Please discuss with provider.          Allergies: Bactroban; Indapamide; Lasix; and Sulfa drugs      Physical Exam   BP: (!) 185/89 mmHg  Pulse: 61  Heart Rate: 70  Temp: 97.7  F (36.5  C)  Resp: 16  SpO2: 98 %  Physical Exam   Constitutional: He is oriented to person, place, and time. He appears well-developed and well-nourished. No distress.   HENT:   Head: Normocephalic and atraumatic.   Right Ear: External ear normal.   Left Ear: External ear normal.   Nose: Mucosal edema present.   Mouth/Throat: Oropharynx is clear and moist. No oropharyngeal exudate.   Eyes: Conjunctivae and EOM are normal. Pupils are equal, round, and reactive to light. Right eye exhibits no discharge. Left eye exhibits no discharge. No scleral icterus.   Neck: Normal range of motion. Neck supple. No JVD present.   Cardiovascular: Normal rate, regular rhythm, normal heart sounds and intact distal pulses.  Exam reveals no gallop and no friction rub.    No murmur heard.  Pulmonary/Chest: Effort normal and breath sounds normal. No respiratory distress. He has no wheezes. He has no rales. He exhibits no tenderness.   Abdominal: There is no tenderness.   Musculoskeletal: Normal range of motion.   Lymphadenopathy:     He has no cervical adenopathy.   Neurological: He is alert and oriented to person, place, and time.   Skin: Skin is warm and dry. No rash noted. He is not diaphoretic. There is erythema (Mild erythema to left anterior shin with scant clear serous drainage. ). No pallor.   Psychiatric: He has a normal mood and affect. His behavior is normal.   Nursing note and vitals reviewed.      ED Course   Procedures               Labs Ordered and Resulted from Time  of ED Arrival Up to the Time of Departure from the ED - No data to display    Assessments & Plan (with Medical Decision Making)   Seun has mild nasal congestion for which he was prescribed flonase. His left leg has mild cellulitis for which he is already on Ceftin x 2 days for. Wife reports erythema has slightly improved and he has daily nurses that change the bandages. Recommended wound care consult as well. Pt was transferred back home via stretcher transport in good condition.     Plan: Use the Flonase as prescribed for your nasal congestion. Return here with new/worsening symptoms.     I have reviewed the nursing notes.    I have reviewed the findings, diagnosis, plan and need for follow up with the patient.    Discharge Medication List as of 1/10/2017  1:32 PM      START taking these medications    Details   !! fluticasone (FLONASE) 50 MCG/ACT spray Spray 2 sprays into both nostrils 2 times daily, Disp-1 Bottle, R-0, E-Prescribe       !! - Potential duplicate medications found. Please discuss with provider.          Final diagnoses:   Nasal congestion       1/10/2017   [facility]      Cecelia Zuleta PA-C  01/12/17 1034

## 2017-12-16 PROBLEM — I10 BENIGN ESSENTIAL HYPERTENSION: Status: ACTIVE | Noted: 2017-01-01

## 2017-12-16 NOTE — ED NOTES
Wife, Dania, present at this time. States she has noted increased weakness and confusion for last few days. Also reports increased redness and warmth to bilateral lower extremities. Also notes increased congestion. States Home Health RN visits patient once a week, patient has a daily PCA. Wife responsible for dressing changes to lower extremities. Last Oxy 10mg at 0400, last Norco 10/325 at 0700 Friday morning. No other po meds since Thursday. Wife states patient is up in his electric wheelchair throughout most days, hasn't walked in several years. Dr Kathleen updated.

## 2017-12-16 NOTE — PHARMACY-VANCOMYCIN DOSING SERVICE
Pharmacy Vancomycin Initial Note  Date of Service 2017  Patient's  1945  72 year old, male    Indication: Skin and Soft Tissue Infection    Current estimated CrCl = Estimated Creatinine Clearance: 68.5 mL/min (based on Cr of 1.12).    Creatinine for last 3 days  2017:  9:30 AM Creatinine 1.12 mg/dL    Recent Vancomycin Level(s) for last 3 days  No results found for requested labs within last 72 hours.      Vancomycin IV Administrations (past 72 hours)                   vancomycin (VANCOCIN) 1000 mg in dextrose 5% 200 mL PREMIX (mg) 1,000 mg New Bag 17 1058                Nephrotoxins and other renal medications (Future)    Start     Dose/Rate Route Frequency Ordered Stop    17 1500  vancomycin (VANCOCIN) 750 mg in NaCl 0.9 % 250 mL intermittent infusion      750 mg  188.7 mL/hr over 90 Minutes Intravenous ONCE 17 1345      17 1300  lisinopril (PRINIVIL/ZESTRIL) tablet 10 mg      10 mg Oral DAILY 17 1257      17 1300  torsemide (DEMADEX) tablet 20 mg      20 mg Oral DAILY 17 1257      17 1041  ampicillin-sulbactam (UNASYN) 3 g in NaCl 0.9 % 100 mL intermittent infusion      3 g  100 mL/hr over 1 Hours Intravenous EVERY 6 HOURS 17 1041            Contrast Orders - past 72 hours     None                Plan:  1. Patient received vancomycin 1000 mg IV x1 in ER, will dose another vancomycin 750 mg IV x1 to equal a 1750 mg dose, then will start vancomycin  1750 mg IV q18h.   2.  Goal Trough Level: 15-20 mg/L   3.  Pharmacy will check trough levels as appropriate in 3-5 Days.    4. Serum creatinine levels will be ordered daily for the first week of therapy and at least twice weekly for subsequent weeks.    5. [place] method utilized to dose vancomycin therapy: Method 2    Lolis Gruber

## 2017-12-16 NOTE — ED NOTES
DATE:  12/16/2017   TIME OF RECEIPT FROM LAB:  1034  LAB TEST:  Troponin  LAB VALUE:  0.141  RESULTS GIVEN WITH READ-BACK TO (PROVIDER):  Solo Kathleen MD  TIME LAB VALUE REPORTED TO PROVIDER:   1971
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
## 2017-12-16 NOTE — PHARMACY
[facility]    Pharmacy      Antimicrobial Stewardship Note     Current antimicrobial therapy:  Anti-infectives (Future)    Start     Dose/Rate Route Frequency Ordered Stop    12/16/17 1500  vancomycin (VANCOCIN) 750 mg in NaCl 0.9 % 250 mL intermittent infusion      750 mg  188.7 mL/hr over 90 Minutes Intravenous ONCE 12/16/17 1345      12/16/17 1300  clindamycin (CLEOCIN) infusion 900 mg      900 mg  50 mL/hr over 60 Minutes Intravenous EVERY 8 HOURS 12/16/17 1257      12/16/17 1041  ampicillin-sulbactam (UNASYN) 3 g in NaCl 0.9 % 100 mL intermittent infusion      3 g  100 mL/hr over 1 Hours Intravenous EVERY 6 HOURS 12/16/17 1041            Indication: SSTI     Pertinent labs:  Creatinine   Creatinine   Date Value Ref Range Status   12/16/2017 1.12 0.66 - 1.25 mg/dL Final   10/13/2016 1.14 0.66 - 1.25 mg/dL Final   10/12/2016 1.23 0.66 - 1.25 mg/dL Final     WBC   WBC   Date Value Ref Range Status   12/16/2017 14.8 (H) 4.0 - 11.0 10e9/L Final   10/12/2016 7.5 4.0 - 11.0 10e9/L Final   10/11/2016 11.1 (H) 4.0 - 11.0 10e9/L Final     Procalcitonin No results found for: PCAL  CRP   CRP Inflammation   Date Value Ref Range Status   12/16/2017 20.4 (H) 0.0 - 8.0 mg/L Final   10/12/2016 78.7 (H) 0.0 - 8.0 mg/L Final   08/02/2016 165.0 (H) 0.0 - 8.0 mg/L Final       Culture Results:    Component Value Units Date/Time        Blood culture [[id number]] Collected: 12/16/17 1022       Order Status: Completed Specimen: Blood Updated: 12/16/17 1409        Specimen Description Blood        Special Requests Left Arm        Culture Micro No growth after 3 hours       Blood culture [[id number]] Collected: 12/16/17 1016       Order Status: Completed Specimen: Blood Updated: 12/16/17 1409        Specimen Description Blood        Special Requests Right Arm        Culture Micro No growth after 3 hours       Influenza A/B antigen [[id number]] Collected: 12/16/17 1015       Order Status: Completed Specimen: Nares Updated:  12/16/17 1040        Influenza A/B Agn Specimen Nares        Influenza A Negative        Influenza B Negative         Test results must be correlated with clinical data. If necessary, results   should be confirmed by a molecular assay or viral culture.             Urine Culture Aerobic Bacterial [[id number]] Collected: 12/16/17 1029       Order Status: Completed Specimen: Urine Updated: 12/16/17 1031           Recommendations/Interventions:  1. No recommendations at this time    Lolis Gruber, [facility]  December 16, 2017

## 2017-12-16 NOTE — IP AVS SNAPSHOT
MRN:[id number]                      After Visit Summary   12/16/2017    Seun Emanuel    MRN: [id number]           Thank you!     Thank you for choosing [facility] for your care. Our goal is always to provide you with excellent care. Hearing back from our patients is one way we can continue to improve our services. Please take a few minutes to complete the written survey that you may receive in the mail after you visit with us. Thank you!        Patient Information     Date Of Birth          1945        Designated Caregiver       Most Recent Value    Caregiver    Will someone help with your care after discharge? yes    Name of designated caregiver Kimmy    Phone number of caregiver [phone]    Caregiver address See facesheet      About your hospital stay     You were admitted on:  December 16, 2017 You last received care in the:  [facility]    You were discharged on:  December 19, 2017       Who to Call     For medical emergencies, please call 911.  For non-urgent questions about your medical care, please call your primary care provider or clinic, [phone]          Attending Provider     Provider Specialty    Solo Kathleen MD Emergency Medicine    Herbie Peter DO [facility]       Primary Care Provider Office Phone # Fax #    Herbie Peter [phone]      After Care Instructions     Activity       Your activity upon discharge: activity as tolerated            Diet       Follow this diet upon discharge: Regular                  Follow-up Appointments     Follow-up and recommended labs and tests        Follow up with primary care provider, Herbie Peter, within 2 weeks, for hospital follow- up. No follow up labs or test are needed.                  Further instructions from your care team       Per your request of scheduling your own appointment we have not set up a  follow up appointment. Please schedule an appointment to see  "Dr. Peter in two weeks.    Pending Results     Date and Time Order Name Status Description    2017 1006 Blood culture Preliminary     2017 1006 Blood culture Preliminary             Statement of Approval     Ordered          17 1200  I have reviewed and agree with all the recommendations and orders detailed in this document.  EFFECTIVE NOW     Approved and electronically signed by:  Herbie Peter DO             Admission Information     Date & Time Provider Department Dept. Phone    2017 Herbie Peter,  [facility] [phone]      Your Vitals Were     Blood Pressure Temperature Respirations Height Weight Pulse Oximetry    170/68 98.9  F (37.2  C) (Tympanic) 20 1.803 m (5' 11\") 93 kg (205 lb) 91%    BMI (Body Mass Index)                   28.59 kg/m2           MyChart Information     [facility] lets you send messages to your doctor, view your test results, renew your prescriptions, schedule appointments and more. To sign up, go to [email or web address] . Click on \"Log in\" on the left side of the screen, which will take you to the Welcome page. Then click on \"Sign up Now\" on the right side of the page.     You will be asked to enter the access code listed below, as well as some personal information. Please follow the directions to create your username and password.     Your access code is: [email or web address]  Expires: 3/19/2018  1:16 PM     Your access code will  in 90 days. If you need help or a new code, please call your [facility] clinic or [phone].        Care EveryWhere ID     This is your Care EveryWhere ID. This could be used by other organizations to access your [facility] medical records  [id number]        Equal Access to Services     [facility] : Hadaxel Birch, ashleigh trujillo, michelle juarez. So [facility] [place] [phone].    ATENCIÓN: Si habla español, tiene a martinez disposición servicios " yesenia de asistencia lingüística. Aidan escobedo [phone].    We comply with applicable federal civil rights laws and [place] laws. We do not discriminate on the basis of race, color, national origin, age, disability, sex, sexual orientation, or gender identity.               Review of your medicines      START taking        Dose / Directions    amLODIPine 10 MG tablet   Commonly known as:  NORVASC        Dose:  5 mg   Start taking on:  12/20/2017   Take 0.5 tablets (5 mg) by mouth daily   Quantity:  30 tablet   Refills:  1       clindamycin 300 MG capsule   Commonly known as:  CLEOCIN   Used for:  Cellulitis of both lower extremities        Dose:  300 mg   Take 1 capsule (300 mg) by mouth 3 times daily for 7 days   Quantity:  21 capsule   Refills:  0         CONTINUE these medicines which may have CHANGED, or have new prescriptions. If we are uncertain of the size of tablets/capsules you have at home, strength may be listed as something that might have changed.        Dose / Directions    warfarin 2.5 MG tablet   Commonly known as:  COUMADIN   This may have changed:  additional instructions   Used for:  Confusion associated with infection        Dose:  2.5 mg   Take 1 tablet (2.5 mg) by mouth daily Tuesday, Thursday   Quantity:  30 tablet   Refills:  1         CONTINUE these medicines which have NOT CHANGED        Dose / Directions    ACETAMINOPHEN PO        Dose:  650 mg   Take 650 mg by mouth every 6 hours as needed for pain   Refills:  0       * ALBUTEROL INHALER 17GM        Dose:  2 puff   Inhale 2 puffs into the lungs every 4 hours as needed This product no longer available   Refills:  0       * albuterol (2.5 MG/3ML) 0.083% neb solution        Dose:  1 vial   Take 1 vial by nebulization every 2 hours as needed for shortness of breath / dyspnea or wheezing   Refills:  0       ASPIRIN PO        Dose:  81 mg   Take 81 mg by mouth daily   Refills:  0       BACLOFEN PO        Dose:  10 mg   Take 10 mg by mouth  2 times daily   Refills:  0       COLCHICINE PO        Dose:  0.6 mg   Take 0.6 mg by mouth daily as needed   Refills:  0       diphenhydrAMINE-zinc acetate cream   Commonly known as:  BENADRYL        Dose:  2 g   Apply 2 g topically 3 times daily as needed for itching (for itching legs)   Refills:  0       DOCUSATE SODIUM PO        Dose:  100 mg   Take 100 mg by mouth 2 times daily   Refills:  0       fentaNYL 100 mcg/hr 72 hr patch   Commonly known as:  DURAGESIC        Dose:  1 patch   Place 1 patch onto the skin every 72 hours   Refills:  0       * fluticasone 50 MCG/ACT spray   Commonly known as:  FLONASE        Dose:  1-2 spray   Spray 1-2 sprays into both nostrils daily   Quantity:  1 Package   Refills:  0       * fluticasone 50 MCG/ACT spray   Commonly known as:  FLONASE        Dose:  2 spray   Spray 2 sprays into both nostrils 2 times daily   Quantity:  1 Bottle   Refills:  0       fluticasone-salmeterol 250-50 MCG/DOSE diskus inhaler   Commonly known as:  ADVAIR        Dose:  1 puff   Inhale 1 puff into the lungs 2 times daily   Refills:  0       HYDROcodone-acetaminophen  MG per tablet   Commonly known as:  NORCO   Used for:  Cellulitis and abscess of leg        Dose:  1 tablet   Take 1 tablet by mouth every 6 hours as needed for moderate to severe pain   Quantity:  60 tablet   Refills:  0       HYDROXYZINE HCL PO        Dose:  50 mg   Take 50 mg by mouth 3 times daily as needed for itching   Refills:  0       lidocaine 2 % topical gel   Commonly known as:  XYLOCAINE        Dose:  1 Tube   Apply 1 Tube topically as needed for moderate pain (apply to itching legs 3-4 times daily)   Refills:  0       lisinopril 10 MG tablet   Commonly known as:  PRINIVIL/ZESTRIL        Dose:  10 mg   Take 1 tablet (10 mg) by mouth daily   Quantity:  30 tablet   Refills:  0       LOSARTAN POTASSIUM PO        Dose:  100 mg   Take 100 mg by mouth daily   Refills:  0       METOPROLOL SUCCINATE ER PO        Dose:  100 mg    Take 100 mg by mouth daily   Refills:  0       NEURONTIN PO        Dose:  100 mg   Take 100 mg by mouth 3 times daily   Refills:  0       nicotine 21 MG/24HR 24 hr patch   Commonly known as:  NICODERM CQ   Used for:  Cellulitis and abscess of leg        Dose:  1 patch   Place 1 patch onto the skin every 24 hours   Quantity:  30 patch   Refills:  0       * order for DME   Used for:  COPD exacerbation (H)        Equipment being ordered: Other: *Neb machine** Treatment Diagnosis: *COPD**   Quantity:  1 Device   Refills:  0       * order for DME   Used for:  Cellulitis, Ulcers of both lower legs, with unspecified severity        Equipment being ordered: Bed Cradle   Quantity:  1 Device   Refills:  0       * order for DME   Used for:  Ulcers of both lower legs, limited to breakdown of skin (H)        Equipment being ordered: Other: **Rook boots for chronic stasis dermatitis and ulcers.* Treatment Diagnosis: *CKD3 , chronic edema with ulcers**   Quantity:  1 Package   Refills:  2       * order for DME   Used for:  Hemiparesis due to old cerebrovascular accident (H), Arthritis of left shoulder region        Equipment being ordered: Other: rollo cushion and arm sling Treatment Diagnosis: stage 2 pressure sores and left should pain and OA of ac joint   Quantity:  1 Device   Refills:  0       * OXYCODONE HCL PO        Dose:  10 mg   Take 10 mg by mouth every 8 hours as needed   Refills:  0       * OXYCODONE HCL PO        Dose:  15 mg   Take 15 mg by mouth every 8 hours as needed   Refills:  0       RISPERIDONE PO        Dose:  0.25 mg   Take 0.25 mg by mouth   Refills:  0       saccharomyces boulardii 250 MG capsule   Commonly known as:  FLORASTOR   Used for:  Diarrhea        Dose:  250 mg   Take 1 capsule (250 mg) by mouth 2 times daily   Quantity:  14 capsule   Refills:  0       SYNTHROID PO        Dose:  125 mcg   Take 125 mcg by mouth daily   Refills:  0       TORSEMIDE PO        Dose:  20 mg   Take 20 mg by mouth daily    Refills:  0       ZOLPIDEM TARTRATE PO        Dose:  10 mg   Take 10 mg by mouth nightly as needed   Refills:  0       * Notice:  This list has 10 medication(s) that are the same as other medications prescribed for you. Read the directions carefully, and ask your doctor or other care provider to review them with you.         Where to get your medicines      These medications were sent to [id number] - [place], [place] - [address]  [address] [place] [address]     Phone:  [phone]     amLODIPine 10 MG tablet    clindamycin 300 MG capsule               ANTIBIOTIC INSTRUCTION     You've Been Prescribed an Antibiotic - Now What?  Your healthcare team thinks that you or your loved one might have an infection. Some infections can be treated with antibiotics, which are powerful, life-saving drugs. Like all medications, antibiotics have side effects and should only be used when necessary. There are some important things you should know about your antibiotic treatment.      Your healthcare team may run tests before you start taking an antibiotic.    Your team may take samples (e.g., from your blood, urine or other areas) to run tests to look for bacteria. These test can be important to determine if you need an antibiotic at all and, if you do, which antibiotic will work best.      Within a few days, your healthcare team might change or even stop your antibiotic.    Your team may start you on an antibiotic while they are working to find out what is making you sick.    Your team might change your antibiotic because test results show that a different antibiotic would be better to treat your infection.    In some cases, once your team has more information, they learn that you do not need an antibiotic at all. They may find out that you don't have an infection, or that the antibiotic you're taking won't work against your infection. For example, an infection caused by a virus can't be treated with  antibiotics. Staying on an antibiotic when you don't need it is more likely to be harmful than helpful.      You may experience side effects from your antibiotic.    Like all medications, antibiotics have side effects. Some of these can be serious.    Let you healthcare team know if you have any known allergies when you are admitted to the hospital.    One significant side effect of nearly all antibiotics is the risk of severe and sometimes deadly diarrhea caused by Clostridium difficile (C. Difficile). This occurs when a person takes antibiotics because some good germs are destroyed. Antibiotic use allows C. diificile to take over, putting patients at high risk for this serious infection.    As a patient or caregiver, it is important to understand your or your loved one's antibiotic treatment. It is especially important for caregivers to speak up when patients can't speak for themselves. Here are some important questions to ask your healthcare team.    What infection is this antibiotic treating and how do you know I have that infection?    What side effects might occur from this antibiotic?    How long will I need to take this antibiotic?    Is it safe to take this antibiotic with other medications or supplements (e.g., vitamins) that I am taking?     Are there any special directions I need to know about taking this antibiotic? For example, should I take it with food?    How will I be monitored to know whether my infection is responding to the antibiotic?    What tests may help to make sure the right antibiotic is prescribed for me?      Information provided by:  www.cdc.gov/getsmart  U.S. Department of Health and Human Services  Centers for disease Control and Prevention  National Center for Emerging and Zoonotic Infectious Diseases  Division of Healthcare Quality Promotion         Protect others around you: Learn how to safely use, store and throw away your medicines at www.disposemymeds.org.              Medication List: This is a list of all your medications and when to take them. Check marks below indicate your daily home schedule. Keep this list as a reference.      Medications           Morning Afternoon Evening Bedtime As Needed    ACETAMINOPHEN PO   Take 650 mg by mouth every 6 hours as needed for pain   Last time this was given:  650 mg on 12/17/2017  9:39 AM                                * ALBUTEROL INHALER 17GM   Inhale 2 puffs into the lungs every 4 hours as needed This product no longer available                                * albuterol (2.5 MG/3ML) 0.083% neb solution   Take 1 vial by nebulization every 2 hours as needed for shortness of breath / dyspnea or wheezing   Last time this was given:  2.5 mg on 12/18/2017  5:09 PM                                amLODIPine 10 MG tablet   Commonly known as:  NORVASC   Take 0.5 tablets (5 mg) by mouth daily   Start taking on:  12/20/2017   Last time this was given:  10 mg on 12/19/2017  8:00 AM                                ASPIRIN PO   Take 81 mg by mouth daily   Last time this was given:  81 mg on 12/19/2017  8:00 AM                                BACLOFEN PO   Take 10 mg by mouth 2 times daily   Last time this was given:  10 mg on 12/19/2017  8:00 AM                                clindamycin 300 MG capsule   Commonly known as:  CLEOCIN   Take 1 capsule (300 mg) by mouth 3 times daily for 7 days                                COLCHICINE PO   Take 0.6 mg by mouth daily as needed                                diphenhydrAMINE-zinc acetate cream   Commonly known as:  BENADRYL   Apply 2 g topically 3 times daily as needed for itching (for itching legs)                                DOCUSATE SODIUM PO   Take 100 mg by mouth 2 times daily   Last time this was given:  100 mg on 12/19/2017  8:00 AM                                fentaNYL 100 mcg/hr 72 hr patch   Commonly known as:  DURAGESIC   Place 1 patch onto the skin every 72 hours                                 * fluticasone 50 MCG/ACT spray   Commonly known as:  FLONASE   Spray 1-2 sprays into both nostrils daily   Last time this was given:  2 sprays on 12/19/2017  8:15 AM                                * fluticasone 50 MCG/ACT spray   Commonly known as:  FLONASE   Spray 2 sprays into both nostrils 2 times daily   Last time this was given:  2 sprays on 12/19/2017  8:15 AM                                fluticasone-salmeterol 250-50 MCG/DOSE diskus inhaler   Commonly known as:  ADVAIR   Inhale 1 puff into the lungs 2 times daily                                HYDROcodone-acetaminophen  MG per tablet   Commonly known as:  NORCO   Take 1 tablet by mouth every 6 hours as needed for moderate to severe pain                                HYDROXYZINE HCL PO   Take 50 mg by mouth 3 times daily as needed for itching                                lidocaine 2 % topical gel   Commonly known as:  XYLOCAINE   Apply 1 Tube topically as needed for moderate pain (apply to itching legs 3-4 times daily)                                lisinopril 10 MG tablet   Commonly known as:  PRINIVIL/ZESTRIL   Take 1 tablet (10 mg) by mouth daily   Last time this was given:  10 mg on 12/16/2017  2:56 PM                                LOSARTAN POTASSIUM PO   Take 100 mg by mouth daily   Last time this was given:  100 mg on 12/19/2017  8:00 AM                                METOPROLOL SUCCINATE ER PO   Take 100 mg by mouth daily   Last time this was given:  100 mg on 12/19/2017  8:00 AM                                NEURONTIN PO   Take 100 mg by mouth 3 times daily   Last time this was given:  100 mg on 12/19/2017  8:00 AM                                nicotine 21 MG/24HR 24 hr patch   Commonly known as:  NICODERM CQ   Place 1 patch onto the skin every 24 hours   Last time this was given:  1 patch on 12/19/2017  8:00 AM                                * order for DME   Equipment being ordered: Other: *Neb machine** Treatment Diagnosis:  *COPD**                                * order for DME   Equipment being ordered: Bed Cradle                                * order for DME   Equipment being ordered: Other: **Rook boots for chronic stasis dermatitis and ulcers.* Treatment Diagnosis: *CKD3 , chronic edema with ulcers**                                * order for DME   Equipment being ordered: Other: rollo cushion and arm sling Treatment Diagnosis: stage 2 pressure sores and left should pain and OA of ac joint                                * OXYCODONE HCL PO   Take 10 mg by mouth every 8 hours as needed   Last time this was given:  15 mg on 12/19/2017 11:33 AM                                * OXYCODONE HCL PO   Take 15 mg by mouth every 8 hours as needed   Last time this was given:  15 mg on 12/19/2017 11:33 AM                                RISPERIDONE PO   Take 0.25 mg by mouth                                saccharomyces boulardii 250 MG capsule   Commonly known as:  FLORASTOR   Take 1 capsule (250 mg) by mouth 2 times daily   Last time this was given:  250 mg on 12/19/2017  8:00 AM                                SYNTHROID PO   Take 125 mcg by mouth daily   Last time this was given:  125 mcg on 12/19/2017  5:40 AM                                TORSEMIDE PO   Take 20 mg by mouth daily   Last time this was given:  20 mg on 12/19/2017  8:00 AM                                warfarin 2.5 MG tablet   Commonly known as:  COUMADIN   Take 1 tablet (2.5 mg) by mouth daily Tuesday, Thursday   Last time this was given:  2.5 mg on 12/18/2017  6:30 PM                                ZOLPIDEM TARTRATE PO   Take 10 mg by mouth nightly as needed   Last time this was given:  5 mg on 12/18/2017 11:44 PM                                * Notice:  This list has 10 medication(s) that are the same as other medications prescribed for you. Read the directions carefully, and ask your doctor or other care provider to review them with you.              More Information         Warfarin tablets  Brand Names: Coumadin, Jantoven  What is this medicine?  WARFARIN (WAR far in) is an anticoagulant. It is used to treat or prevent clots in the veins, arteries, lungs, or heart.  How should I use this medicine?  Take this medicine by mouth with a glass of water. Follow the directions on the prescription label. You can take this medicine with or without food. Take your medicine at the same time each day. Do not take it more often than directed. Do not stop taking except on your doctor's advice. Stopping this medicine may increase your risk of a blood clot. Be sure to refill your prescription before you run out of medicine.  If your doctor or healthcare professional calls to change your dose, write down the dose and any other instructions. Always read the dose and instructions back to him or her to make sure you understand them. Tell your doctor or healthcare professional what strength of tablets you have on hand. Ask how many tablets you should take to equal your new dose. Write the date on the new instructions and keep them near your medicine. If you are told to stop taking your medicine until your next blood test, call your doctor or healthcare professional if you do not hear anything within 24 hours of the test to find out your new dose or when to restart your prior dose.  A special MedGuide will be given to you by the pharmacist with each prescription and refill. Be sure to read this information carefully each time.  Talk to your pediatrician regarding the use of this medicine in children. Special care may be needed.  What side effects may I notice from receiving this medicine?  Side effects that you should report to your doctor or health care professional as soon as possible:    allergic reactions like skin rash, itching or hives, swelling of the face, lips, or tongue    breathing problems    chest pain    dizziness    headache    heavy menstrual bleeding or vaginal bleeding    pain in  the lower back or side    painful, blue or purple toes    painful skin ulcers that do not go away    signs and symptoms of bleeding such as bloody or black, tarry stools; red or dark-brown urine; spitting up blood or brown material that looks like coffee grounds; red spots on the skin; unusual bruising or bleeding from the eye, gums, or nose    stomach pain    unusually weak or tired  Side effects that usually do not require medical attention (report to your doctor or health care professional if they continue or are bothersome):    diarrhea    hair loss  What may interact with this medicine?  Do not take this medicine with any of the following medications:    agents that prevent or dissolve blood clots    aspirin or other salicylates    danshen    dextrothyroxine    mifepristone    [place]'s Wort    red yeast rice  This medicine may also interact with the following medications:    acetaminophen    agents that lower cholesterol    alcohol    allopurinol    amiodarone    antibiotics or medicines for treating bacterial, fungal or viral infections    azathioprine    barbiturate medicines for inducing sleep or treating seizures    certain medicines for diabetes    certain medicines for heart rhythm problems    certain medicines for high blood pressure    chloral hydrate    cisapride    disulfiram    female hormones, including contraceptive or birth control pills    general anesthetics    herbal or dietary products like garlic, ginkgo, ginseng, green tea, or kava kava    influenza virus vaccine    male hormones    medicines for mental depression or psychosis    medicines for some types of cancer    medicines for stomach problems    methylphenidate    NSAIDs, medicines for pain and inflammation, like ibuprofen or naproxen    propoxyphene    quinidine, quinine    raloxifene    seizure or epilepsy medicine like carbamazepine, phenytoin, and valproic acid    steroids like cortisone and prednisone    tamoxifen    thyroid  medicine    tramadol    vitamin c, vitamin e, and vitamin K    zafirlukast    zileuton  What if I miss a dose?  It is important not to miss a dose. If you miss a dose, call your healthcare provider. Take the dose as soon as possible on the same day. If it is almost time for your next dose, take only that dose. Do not take double or extra doses to make up for a missed dose.  Where should I keep my medicine?  Keep out of the reach of children.  Store at room temperature between 15 and 30 degrees C (59 and 86 degrees F). Protect from light. Throw away any unused medicine after the expiration date. Do not flush down the toilet.  What should I tell my health care provider before I take this medicine?  They need to know if you have any of these conditions:    alcoholism    anemia    bleeding disorders    cancer    diabetes    heart disease    high blood pressure    history of bleeding in the gastrointestinal tract    history of stroke or other brain injury or disease    kidney or liver disease    protein C deficiency    protein S deficiency    psychosis or dementia    recent injury, recent or planned surgery or procedure    an unusual or allergic reaction to warfarin, other medicines, foods, dyes, or preservatives    pregnant or trying to get pregnant    breast-feeding  What should I watch for while using this medicine?  Visit your doctor or health care professional for regular checks on your progress. You will need to have a blood test called a PT/INR regularly. The PT/INR blood test is done to make sure you are getting the right dose of this medicine. It is important to not miss your appointment for the blood tests. When you first start taking this medicine, these tests are done often. Once the correct dose is determined and you take your medicine properly, these tests can be done less often.  Notify your doctor or health care professional and seek emergency treatment if you develop breathing problems; changes in  vision; chest pain; severe, sudden headache; pain, swelling, warmth in the leg; trouble speaking; sudden numbness or weakness of the face, arm or leg. These can be signs that your condition has gotten worse.  While you are taking this medicine, carry an identification card with your name, the name and dose of medicine(s) being used, and the name and phone number of your doctor or health care professional or person to contact in an emergency.  Do not start taking or stop taking any medicines or over-the-counter medicines except on the advice of your doctor or health care professional.  You should discuss your diet with your doctor or health care professional. Do not make major changes in your diet. Vitamin K can affect how well this medicine works. Many foods contain vitamin K. It is important to eat a consistent amount of foods with vitamin K. Other foods with vitamin K that you should eat in consistent amounts are asparagus, basil, beef or pork liver, black eyed peas, broccoli, brussel sprouts, cabbage, chickpeas, cucumber with peel, green onions, green tea, okra, parsley, peas, thyme, and green leafy vegetables like beet greens, robe greens, endive, kale, mustard greens, spinach, turnip greens, watercress, or certain lettuces like green leaf or gretel.  This medicine can cause birth defects or bleeding in an unborn child. Women of childbearing age should use effective birth control while taking this medicine. If a woman becomes pregnant while taking this medicine, she should discuss the potential risks and her options with her health care professional.  Avoid sports and activities that might cause injury while you are using this medicine. Severe falls or injuries can cause unseen bleeding. Be careful when using sharp tools or knives. Consider using an electric razor. Take special care brushing or flossing your teeth. Report any injuries, bruising, or red spots on the skin to your doctor or health care  professional.  If you have an illness that causes vomiting, diarrhea, or fever for more than a few days, contact your doctor. Also check with your doctor if you are unable to eat for several days. These problems can change the effect of this medicine.  Even after you stop taking this medicine, it takes several days before your body recovers its normal ability to clot blood. Ask your doctor or health care professional how long you need to be careful. If you are going to have surgery or dental work, tell your doctor or health care professional that you have been taking this medicine.  NOTE:This sheet is a summary. It may not cover all possible information. If you have questions about this medicine, talk to your doctor, pharmacist, or health care provider. Copyright  2017 [employer]                Clindamycin capsules  Brand Name: Cleocin  What is this medicine?  CLINDAMYCIN (JULIETTE Streeter) is a lincosamide antibiotic. It is used to treat certain kinds of bacterial infections. It will not work for colds, flu, or other viral infections.  How should I use this medicine?  Take this medicine by mouth with a full glass of water. Follow the directions on the prescription label. You can take this medicine with food or on an empty stomach. If the medicine upsets your stomach, take it with food. Take your medicine at regular intervals. Do not take your medicine more often than directed. Take all of your medicine as directed even if you think your are better. Do not skip doses or stop your medicine early.  Talk to your pediatrician regarding the use of this medicine in children. Special care may be needed.  What side effects may I notice from receiving this medicine?  Side effects that you should report to your doctor or health care professional as soon as possible:    allergic reactions like skin rash, itching or hives, swelling of the face, lips, or tongue    dark urine    pain on swallowing    redness, blistering, peeling or  loosening of the skin, including inside the mouth    unusual bleeding or bruising    unusually weak or tired    yellowing of eyes or skin  Side effects that usually do not require medical attention (report to your doctor or health care professional if they continue or are bothersome):    diarrhea    itching in the rectal or genital area    joint pain    nausea, vomiting    stomach pain  What may interact with this medicine?    birth control pills    erythromycin    medicines that relax muscles for surgery    rifampin  What if I miss a dose?  If you miss a dose, take it as soon as you can. If it is almost time for your next dose, take only that dose. Do not take double or extra doses.  Where should I keep my medicine?  Keep out of the reach of children.  Store at room temperature between 20 and 25 degrees C (68 and 77 degrees F). Throw away any unused medicine after the expiration date.  What should I tell my health care provider before I take this medicine?  They need to know if you have any of these conditions:    kidney disease    liver disease    stomach problems like colitis    an unusual or allergic reaction to clindamycin, lincomycin, or other medicines, foods, dyes like tartrazine or preservatives    pregnant or trying to get pregnant    breast-feeding  What should I watch for while using this medicine?  Tell your doctor or healthcare professional if your symptoms do not start to get better or if they get worse.  Do not treat diarrhea with over the counter products. Contact your doctor if you have diarrhea that lasts more than 2 days or if it is severe and watery.  NOTE:This sheet is a summary. It may not cover all possible information. If you have questions about this medicine, talk to your doctor, pharmacist, or health care provider. Copyright  2017 [employer]                Amlodipine Besylate Oral tablet  What is this medicine?  AMLODIPINE (am SUMA di peen) is a calcium-channel blocker. It affects the amount  of calcium found in your heart and muscle cells. This relaxes your blood vessels, which can reduce the amount of work the heart has to do. This medicine is used to lower high blood pressure. It is also used to prevent chest pain.  This medicine may be used for other purposes; ask your health care provider or pharmacist if you have questions.  What should I tell my health care provider before I take this medicine?  They need to know if you have any of these conditions:    heart problems like heart failure or aortic stenosis    liver disease    an unusual or allergic reaction to amlodipine, other medicines, foods, dyes, or preservatives    pregnant or trying to get pregnant    breast-feeding  How should I use this medicine?  Take this medicine by mouth with a glass of water. Follow the directions on the prescription label. Take your medicine at regular intervals. Do not take more medicine than directed.  Talk to your pediatrician regarding the use of this medicine in children. Special care may be needed. This medicine has been used in children as young as 6.  Persons over 65 years old may have a stronger reaction to this medicine and need smaller doses.  Overdosage: If you think you have taken too much of this medicine contact a poison control center or emergency room at once.  NOTE: This medicine is only for you. Do not share this medicine with others.  What if I miss a dose?  If you miss a dose, take it as soon as you can. If it is almost time for your next dose, take only that dose. Do not take double or extra doses.  What may interact with this medicine?    herbal or dietary supplements    local or general anesthetics    medicines for high blood pressure    medicines for prostate problems    rifampin  This list may not describe all possible interactions. Give your health care provider a list of all the medicines, herbs, non-prescription drugs, or dietary supplements you use. Also tell them if you smoke, drink  alcohol, or use illegal drugs. Some items may interact with your medicine.  What should I watch for while using this medicine?  Visit your doctor or health care professional for regular check ups. Check your blood pressure and pulse rate regularly. Ask your health care professional what your blood pressure and pulse rate should be, and when you should contact him or her.  This medicine may make you feel confused, dizzy or lightheaded. Do not drive, use machinery, or do anything that needs mental alertness until you know how this medicine affects you. To reduce the risk of dizzy or fainting spells, do not sit or stand up quickly, especially if you are an older patient. Avoid alcoholic drinks; they can make you more dizzy.  Do not suddenly stop taking amlodipine. Ask your doctor or health care professional how you can gradually reduce the dose.  What side effects may I notice from receiving this medicine?  Side effects that you should report to your doctor or health care professional as soon as possible:    allergic reactions like skin rash, itching or hives, swelling of the face, lips, or tongue    breathing problems    changes in vision or hearing    chest pain    fast, irregular heartbeat    swelling of legs or ankles  Side effects that usually do not require medical attention (report to your doctor or health care professional if they continue or are bothersome):    dry mouth    facial flushing    nausea, vomiting    stomach gas, pain    tired, weak    trouble sleeping  This list may not describe all possible side effects. Call your doctor for medical advice about side effects. You may report side effects to FDA at 8-630-FDA-9584.  Where should I keep my medicine?  Keep out of the reach of children.  Store at room temperature between 59 and 86 degrees F (15 and 30 degrees C). Protect from light. Keep container tightly closed. Throw away any unused medicine after the expiration date.  NOTE:This sheet is a summary.  It may not cover all possible information. If you have questions about this medicine, talk to your doctor, pharmacist, or health care provider. Copyright  2016 [employer]                Amlodipine tablets  Brand Name: Norvasc  What is this medicine?  AMLODIPINE (am SUMA serene lakesha) is a calcium-channel blocker. It affects the amount of calcium found in your heart and muscle cells. This relaxes your blood vessels, which can reduce the amount of work the heart has to do. This medicine is used to lower high blood pressure. It is also used to prevent chest pain.  How should I use this medicine?  Take this medicine by mouth with a glass of water. Follow the directions on the prescription label. Take your medicine at regular intervals. Do not take more medicine than directed.  Talk to your pediatrician regarding the use of this medicine in children. Special care may be needed. This medicine has been used in children as young as 6.  Persons over 65 years old may have a stronger reaction to this medicine and need smaller doses.  What side effects may I notice from receiving this medicine?  Side effects that you should report to your doctor or health care professional as soon as possible:    allergic reactions like skin rash, itching or hives, swelling of the face, lips, or tongue    breathing problems    changes in vision or hearing    chest pain    fast, irregular heartbeat    swelling of legs or ankles  Side effects that usually do not require medical attention (report to your doctor or health care professional if they continue or are bothersome):    dry mouth    facial flushing    nausea, vomiting    stomach gas, pain    tired, weak    trouble sleeping  What may interact with this medicine?    herbal or dietary supplements    local or general anesthetics    medicines for high blood pressure    medicines for prostate problems    rifampin  What if I miss a dose?  If you miss a dose, take it as soon as you can. If it is  almost time for your next dose, take only that dose. Do not take double or extra doses.  Where should I keep my medicine?  Keep out of the reach of children.  Store at room temperature between 59 and 86 degrees F (15 and 30 degrees C). Protect from light. Keep container tightly closed. Throw away any unused medicine after the expiration date.  What should I tell my health care provider before I take this medicine?  They need to know if you have any of these conditions:    heart problems like heart failure or aortic stenosis    liver disease    an unusual or allergic reaction to amlodipine, other medicines, foods, dyes, or preservatives    pregnant or trying to get pregnant    breast-feeding  What should I watch for while using this medicine?  Visit your doctor or health care professional for regular check ups. Check your blood pressure and pulse rate regularly. Ask your health care professional what your blood pressure and pulse rate should be, and when you should contact him or her.  This medicine may make you feel confused, dizzy or lightheaded. Do not drive, use machinery, or do anything that needs mental alertness until you know how this medicine affects you. To reduce the risk of dizzy or fainting spells, do not sit or stand up quickly, especially if you are an older patient. Avoid alcoholic drinks; they can make you more dizzy.  Do not suddenly stop taking amlodipine. Ask your doctor or health care professional how you can gradually reduce the dose.  NOTE:This sheet is a summary. It may not cover all possible information. If you have questions about this medicine, talk to your doctor, pharmacist, or health care provider. Copyright  2017 [employer]                Taking Amlodipine  Amlodipine (uy-GR-nz-lakesha) is a calcium channel blocker. It helps relax your blood vessels and get more blood and oxygen to your heart. Relaxing the blood vessels also helps lower your blood pressure and relieve any chest pain you may  have.     Each time you take your medicine, justina it on the calendar so you won't forget.     Medication tips    Read the fact sheet that comes with your medicine. It tells you when and how to take it. Ask for a sheet if you don t get one.    Take your medicine at the same time each day. If it upsets your stomach, take it with food or milk.    If you miss a dose, take it as soon as you remember -- unless it is almost time for your next dose. If so, skip the missed dose. Do not take a double dose.    Call your doctor or pharmacist if you have any questions about taking your medicine.    Take your medicine even if you feel well. Most people with high blood pressure don t feel sick.  For your safety    Ask your doctor or pharmacist if there are any foods or medicines you should avoid.    To prevent dizziness, get up slowly after sitting or lying down.    Do not stop taking your medicine unless your doctor tells you to. Doing so can make your condition worse. When stopping this medicine, the dose may need to be slowly decreased.    Tell your doctor or pharmacist before taking any other prescription or over-the-counter medicines. This includes vitamin or mineral supplements and herbal remedies.    Talk to your doctor or pharmacist about whether drinking alcohol is safe while taking amlodipine.    Be sure to refill your prescription before you run out.    Do not share your medicine with anyone.    Ask your doctor how often you should have your blood pressure checked.  When to seek medical advice  Call your healthcare provider right away if any of these occur:    You notice swelling in your ankles or feet or your skin flushes    You have a headache or nausea    You feel tired or weak    You have severe dizziness    You feel chest pain    You have trouble breathing    You develop a skin rash or itching   Date Last Reviewed: 6/1/2016 2000-2017 [employer]. [address] [place] [address], [place] [address]. All rights  reserved. This information is not intended as a substitute for professional medical care. Always follow your healthcare professional's instructions.

## 2017-12-16 NOTE — PLAN OF CARE
DATE:  12/16/2017   TIME OF RECEIPT FROM LAB:    LAB TEST:  0.140  LAB VALUE:  Troponin  RESULTS GIVEN WITH READ-BACK TO (PROVIDER):  Dr. Guevara  TIME LAB VALUE REPORTED TO PROVIDER:   5809    No new orders.

## 2017-12-16 NOTE — IP AVS SNAPSHOT
[facility]    [address] [place] [id number]    Phone:  [phone]    Fax:  [phone]                                       After Visit Summary   12/16/2017    Seun Emanuel    MRN: [id number]           After Visit Summary Signature Page     I have received my discharge instructions, and my questions have been answered. I have discussed any challenges I see with this plan with the nurse or doctor.    ..........................................................................................................................................  Patient/Patient Representative Signature      ..........................................................................................................................................  Patient Representative Print Name and Relationship to Patient    ..................................................               ................................................  Date                                            Time    ..........................................................................................................................................  Reviewed by Signature/Title    ...................................................              ..............................................  Date                                                            Time

## 2017-12-16 NOTE — PLAN OF CARE
[facility] Inpatient Admission Note:    Patient admitted to 3106/3106-1 at approximately 1255 via cart accompanied by transport tech from emergency room . Report received from Shelley MUNSON RN in SBAR format at 1238 via telephone. Patient transferred to bed via slide board.. Patient is alert and oriented X unable to assess, sedated, unable to assess pain; rates at unable to assess on 0-10 scale.  Patient oriented to room, unit, hourly rounding, and plan of care. Explained admission packet and patient handbook with patient bill of rights brochure. Will continue to monitor and document as needed.     Inpatient Nursing criteria listed below was met:      Health care directives status obtained and documented: Yes      Care Everywhere authorization obtained No       MRSA swab completed for patient 65 years and older: N/A      Patient identifies a surrogate decision maker: wife If yes, who:wife Contact Information:see facesheet      Core Measure diagnosis present:No. If yes, state diagnosis: N/A       Is initial lactic acid >2.0? NA.       Vaccination assessment and education completed: Yes   Vaccinations received prior to admission: Pneumovax yes  Influenza(seasonal)  YES   Vaccination(s) ordered: N/A      Clergy visit ordered if patient requests: unable to assess at this time      Skin issues/needs documented: Yes      Isolation Patient: yes Education given, correct sign in place and documentation row added to PCS:  Yes      Fall Prevention Yes: Care plan updated, education given and documented, sticker and magnet in place: Yes      Care Plan initiated: Yes      Education Documented (including assessment): Yes      Patient has discharge needs : Yes If yes, please explain:total cares    Patient arrived to the floor sedated from receiving meds in ED, Unable to complete admission at this time. Wife did not accompany patient to the floor. Will need admission complete once patient awake/wife present.

## 2017-12-16 NOTE — PLAN OF CARE
Carla HAWKINS from [employer] called at [phone] and states if we have questions to call [phone] and they can page her. States patient is w/c bound at home, has home nursing once a week and an aide. Wife does all medications and does dressing changes. [facility] wife will have the most current medication list.

## 2017-12-16 NOTE — ED NOTES
DATE:  12/16/2017   TIME OF RECEIPT FROM LAB:  1030  LAB TEST:  INR  LAB VALUE:  5.28  RESULTS GIVEN WITH READ-BACK TO (PROVIDER):  Solo Kathleen MD  TIME LAB VALUE REPORTED TO PROVIDER:   9336

## 2017-12-16 NOTE — ED NOTES
71 y/o male presents via [facility] with reports of increased weakness and confusion, per wife's report. Patient has a hx of CVA several years ago, bed ridden at home. Patient moaning throughout triage process. Able to answer name and place, unable to answer situation or time. Patient answering yes/no questions between moaning. Awaiting wife for more information. IV unsuccessful by EMS. Patient arrives in hospital, chronic leg wounds wrapped.

## 2017-12-16 NOTE — PLAN OF CARE
Patient slept until just prior to shift change. Oriented to self only. Calling out. Soft touch call light provided for patient. No family came up from ER with patient. Wife takes care of patient at home. Turn and reposition every 2 hours. Patient took meds one at a time with bites of applesauce. Received IV Cleocin and IV Vancomycin. Unasyn was infusing upon arrival to floor. Bed alarm on, call light within reach.     Face to face report given with opportunity to observe patient.      Report given to Kitty Dawkins   12/16/2017  3:05 PM

## 2017-12-17 PROBLEM — E88.09 HYPOALBUMINEMIA: Status: ACTIVE | Noted: 2017-01-01

## 2017-12-17 PROBLEM — E87.6 HYPOKALEMIA: Status: ACTIVE | Noted: 2017-01-01

## 2017-12-17 NOTE — PHARMACY
[facility]    Pharmacy      Antimicrobial Stewardship Note     Current antimicrobial therapy:  Anti-infectives (Future)    Start     Dose/Rate Route Frequency Ordered Stop    12/17/17 0900  vancomycin (VANCOCIN) 1,750 mg in NaCl 0.9 % 500 mL intermittent infusion      1,750 mg  378.7 mL/hr over 90 Minutes Intravenous EVERY 18 HOURS 12/16/17 1514      12/16/17 1300  clindamycin (CLEOCIN) infusion 900 mg      900 mg  50 mL/hr over 60 Minutes Intravenous EVERY 8 HOURS 12/16/17 1257            Indication: SSTI     Pertinent labs:  Creatinine   Creatinine   Date Value Ref Range Status   12/17/2017 1.06 0.66 - 1.25 mg/dL Final   12/16/2017 1.12 0.66 - 1.25 mg/dL Final   10/13/2016 1.14 0.66 - 1.25 mg/dL Final     WBC   WBC   Date Value Ref Range Status   12/17/2017 11.6 (H) 4.0 - 11.0 10e9/L Final   12/16/2017 14.8 (H) 4.0 - 11.0 10e9/L Final   10/12/2016 7.5 4.0 - 11.0 10e9/L Final     Procalcitonin No results found for: PCAL  CRP   CRP Inflammation   Date Value Ref Range Status   12/16/2017 20.4 (H) 0.0 - 8.0 mg/L Final   10/12/2016 78.7 (H) 0.0 - 8.0 mg/L Final   08/02/2016 165.0 (H) 0.0 - 8.0 mg/L Final       Culture Results:   Blood culture [[id number]] Collected: 12/16/17 1022        Order Status: Completed Specimen: Blood Updated: 12/16/17 1409        Specimen Description Blood        Special Requests Left Arm        Culture Micro No growth after 3 hours       Blood culture [[id number]] Collected: 12/16/17 1016       Order Status: Completed Specimen: Blood Updated: 12/16/17 1409        Specimen Description Blood        Special Requests Right Arm        Culture Micro No growth after 3 hours       Influenza A/B antigen [[id number]] Collected: 12/16/17 1015       Order Status: Completed Specimen: Nares Updated: 12/16/17 1040        Influenza A/B Agn Specimen Nares        Influenza A Negative        Influenza B Negative         Test results must be correlated with clinical data. If necessary, results   should  be confirmed by a molecular assay or viral culture.             Urine Culture Aerobic Bacterial [[id number]] Collected: 12/16/17 1029       Order Status: Completed Specimen: Urine Updated: 12/16/17 1031           Recommendations/Interventions:  1. No recommendations at this time    Lolis Gruber, [facility]  December 17, 2017
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
## 2017-12-17 NOTE — PROVIDER NOTIFICATION
DATE:  12/16/2017   TIME OF RECEIPT FROM LAB:  2331  LAB TEST:  Troponin  LAB VALUE:  0.136  RESULTS GIVEN WITH READ-BACK TO (PROVIDER):  Chanel Rodriguez MD  TIME LAB VALUE REPORTED TO PROVIDER:   0658    There will be no more troponin's drawn on this pt per provider.

## 2017-12-17 NOTE — PROVIDER NOTIFICATION
DATE:  12/17/2017   TIME OF RECEIPT FROM LAB:  0640  LAB TEST:  INR  LAB VALUE:  6.75  RESULTS GIVEN WITH READ-BACK TO (PROVIDER):  Chanel Rodriguez MD  TIME LAB VALUE REPORTED TO PROVIDER:   0642    Dr. Guevara will be ordering Vitamin K for pt.

## 2017-12-17 NOTE — PROGRESS NOTES
Two messages left over the course of the day today on home and cell phones requesting a return call to discuss discharge needs and Medicare letter.

## 2017-12-17 NOTE — PROVIDER NOTIFICATION
DATE:  12/17/2017   TIME OF RECEIPT FROM LAB:  0624  LAB TEST:  Potassium   LAB VALUE:  2.9  RESULTS GIVEN WITH READ-BACK TO (PROVIDER):  Chanel Rodriguez MD  TIME LAB VALUE REPORTED TO PROVIDER:   0625    Dr. Guevara will be ordering potassium protocol for him.

## 2017-12-17 NOTE — PLAN OF CARE
Face to face report given with opportunity to observe patient.    Report given to ROSS Green   12/16/2017  11:16 PM

## 2017-12-17 NOTE — PLAN OF CARE
VSS, afebrile. Lungs are clear, dim. Bowels are active, pt has been incontinent of bowel x 1 and bladder x 2. Skin has extreme venous stasis changes to bilateral lower legs, they are warm to the touch, feet are cool without palpable pulses, skin on calves is very coarse and flaky, pt scratch right leg, and bled a scant amount, dressing was changed. Dressing is dry abd pad and kerlex. Pt is severly limited and contracted on the left side, and moderately limited and contracted on the right. He is still able to use call light and get his own glass to drink from, pt has been coughing with thin liquids, however he is fine when they are thickened. Pt takes his pills with apple sauce. Pt pain well controled this shift, pain reported 10/10 in the neck, given prn oxycodone. Pt has been using his call light often. Pt is confused to time and place, but otherwise oriented and makes his needs known. Bed alarms have been active, and call light with in reach.     Face to face report given with opportunity to observe patient.    Report given to ROSS Johnson and ROSS Daniel   12/17/2017  7:43 AM

## 2017-12-17 NOTE — PLAN OF CARE
Problem: Patient Care Overview  Goal: Plan of Care/Patient Progress Review  Outcome: No Change  Order received for PT eval for weakness.  Pt's INR elevated and not appropriate for skilled PT today.  Did talk with pt's wife and pt who report that they have a bam at home that is used for all transfers and a power w/c.  Pt has skilled home OT and was recently discharged from skilled PT but were told it could be added if there was a change or decline.  At this time DC destination is in place and wife reports no concerns with pt returning home and adding home PT back in.  No indication for PT eval at this time, will defer.

## 2017-12-17 NOTE — PLAN OF CARE
Problem: Patient Care Overview  Goal: Plan of Care/Patient Progress Review  Outcome: No Change  Alert and oriented to time and self. Unsure of why he needed to come to hospital. VSS. C/o back pain once with decrease from 15mg oxycodone. Lungs diminished throughout. Completely immobile on L side. RLE severely impaired and RUE mildly impaired. Able to use call light and hold cup. Moderate edema in LLE and severe in foot with weeping open wound on lateral side near ankle, RLE moderate edema throughout. Eschar both legs and feet with no pulses palpable. Dressings changed d/t drainage and abd pad on LLE and dry gauze on RLE with kerlix on both. L and R side of perineal folds excoriated. Bottom red and excoriated with possible pressure injury prior to admission on coccyx. Barrier cream applied, turned and repositioned q2hr. Incontinent of both urine and stool. Pt cooperative and pleasant with this staff. IV- NS 75ml/hr ABX- Vanco and cleocin. Bed alarms on and call light within reach.    Problem: Infection, Risk/Actual (Adult)  Goal: Identify Related Risk Factors and Signs and Symptoms  Related risk factors and signs and symptoms are identified upon initiation of Human Response Clinical Practice Guideline (CPG).  Outcome: No Change   12/16/17 2156   Infection, Risk/Actual   Related Risk Factors (Infection, Risk/Actual) age extremes;chronic illness/condition;tissue perfusion altered;skin integrity impairment   Signs and Symptoms (Infection, Risk/Actual) drainage;edema;lab value changes;mental/behavioral changes     Goal: Infection Prevention/Resolution  Patient will demonstrate the desired outcomes by discharge/transition of care.  Outcome: No Change   12/16/17 2156   Infection, Risk/Actual (Adult)   Infection Prevention/Resolution making progress toward outcome

## 2017-12-17 NOTE — PLAN OF CARE
"Problem: Patient Care Overview  Goal: Plan of Care/Patient Progress Review  Outcome: No Change  Reason for hospital stay:  Cellulitis        Most recent vitals: /66  Temp 99.1  F (37.3  C)  Resp 16  Ht 1.803 m (5' 11\")  Wt 93 kg (205 lb)  SpO2 94%  BMI 28.59 kg/m2    Pain Management:  Administered Roxicodone for c/o pain with good results, got up in recliner for cpl hours et had lunch     Orientation:  A/O with forgetfulness    Cardiac:  MD chagned Lisinopril to Norvasc d/t increased BP    Respiratory:   Expiratory wheezes heard in bilat bases, cleared with cough. Cough non-productive    GI:  XL BM Bowel sounds x4    :  Incontinent    Skin Issues:   Healed scar on coccyx noted, area pink et intact, appears to be from previous ulcer. L gluteal foldappears to be from shear from brief, barrier cream applied. Scrotum et buttocks reddened but blanchable. BLE thick dry flaking tan appearing, wound to lateral LLE et skin tear to R lateral calf from scratching during the noc    IVF: NS 75ml/hr    ABX:   Vanco et Cleocin    Mobility:  Assist of 2 for transfers et cares    Safety:  Alarms on and active, call light within reach        Comments: Using call light appropriately, very calm et pleasant behvior        12/17/2017  4:27 PM  Elizabeth Caro  Face to face report given with opportunity to observe patient.    Report given to  Kranthi Caro   12/17/2017  4:37 PM             "

## 2017-12-17 NOTE — PROGRESS NOTES
[facility]  Progress Note            Subjective:   More awake this am. No signif change in status since yesterday                 Medications:     Current Facility-Administered Medications Ordered in Epic   Medication Dose Route Frequency Last Rate Last Dose     potassium chloride SA (K-DUR/KLOR-CON M) CR tablet 20-40 mEq  20-40 mEq Oral Q2H PRN         potassium chloride (KLOR-CON) Packet 20-40 mEq  20-40 mEq Oral or Feeding Tube Q2H PRN         potassium chloride 10 mEq in 100 mL sterile water intermittent infusion (premix)  10 mEq Intravenous Q1H PRN         potassium chloride 10 mEq in 100 mL intermittent infusion with 10 mg lidocaine  10 mEq Intravenous Q1H PRN         phytonadione (MEPHYTON) tablet 10 mg  10 mg Oral Once         lidocaine 1 % 1 mL  1 mL Other Q1H PRN         lidocaine (LMX4) kit   Topical Q1H PRN         sodium chloride (PF) 0.9% PF flush 3 mL  3 mL Intracatheter Q1H PRN         sodium chloride (PF) 0.9% PF flush 3 mL  3 mL Intracatheter Q8H         0.9% sodium chloride infusion   Intravenous Continuous 75 mL/hr at 12/16/17 1315       acetaminophen (TYLENOL) tablet 650 mg  650 mg Oral Q6H PRN         albuterol neb solution 2.5 mg  1 vial Nebulization Q2H PRN         aspirin chewable tablet 81 mg  81 mg Oral Daily   81 mg at 12/16/17 1455     baclofen (LIORESAL) tablet 10 mg  10 mg Oral BID   10 mg at 12/16/17 2007     diphenhydrAMINE-zinc acetate (BENADRYL) cream   Topical TID PRN         docusate sodium (COLACE) capsule 100 mg  100 mg Oral BID   100 mg at 12/16/17 1456     fluticasone (FLONASE) 50 MCG/ACT spray 1-2 spray  1-2 spray Both Nostrils Daily         fluticasone-vilanterol (BREO ELLIPTA) 200-25 MCG/INH oral inhaler 1 puff  1 puff Inhalation Daily   1 puff at 12/17/17 0717     gabapentin (NEURONTIN) capsule 100 mg  100 mg Oral TID   100 mg at 12/16/17 2007     hydrOXYzine (ATARAX) tablet 50 mg  50 mg Oral TID PRN         levothyroxine (SYNTHROID/LEVOTHROID) tablet 125 mcg   125 mcg Oral QAM AC         lisinopril (PRINIVIL/ZESTRIL) tablet 10 mg  10 mg Oral Daily   10 mg at 12/16/17 1456     losartan (COZAAR) tablet 100 mg  100 mg Oral Daily   100 mg at 12/16/17 1455     metoprolol (TOPROL-XL) 24 hr tablet 100 mg  100 mg Oral Daily   100 mg at 12/16/17 1452     oxyCODONE IR (ROXICODONE) tablet 15 mg  15 mg Oral Q6H PRN   15 mg at 12/17/17 0515     saccharomyces boulardii (FLORASTOR) capsule 250 mg  250 mg Oral BID   250 mg at 12/16/17 2007     torsemide (DEMADEX) tablet 20 mg  20 mg Oral Daily   20 mg at 12/16/17 1453     naloxone (NARCAN) injection 0.1-0.4 mg  0.1-0.4 mg Intravenous Q2 Min PRN         lidocaine 1 % 1 mL  1 mL Other Q1H PRN         lidocaine (LMX4) kit   Topical Q1H PRN         sodium chloride (PF) 0.9% PF flush 3 mL  3 mL Intracatheter Q1H PRN         sodium chloride (PF) 0.9% PF flush 3 mL  3 mL Intracatheter Q8H         Patient is already receiving anticoagulation with heparin, enoxaparin (LOVENOX), warfarin (COUMADIN)  or other anticoagulant medication   Does not apply Continuous PRN         ondansetron (ZOFRAN-ODT) ODT tab 4 mg  4 mg Oral Q6H PRN        Or     ondansetron (ZOFRAN) injection 4 mg  4 mg Intravenous Q6H PRN         magnesium hydroxide (MILK OF MAGNESIA) suspension 30 mL  30 mL Oral Daily PRN         clindamycin (CLEOCIN) infusion 900 mg  900 mg Intravenous Q8H 50 mL/hr at 12/17/17 0509 900 mg at 12/17/17 0509     haloperidol lactate (HALDOL) injection 2 mg  2 mg Intravenous Q6H PRN         zolpidem (AMBIEN) tablet 5 mg  5 mg Oral At Bedtime PRN   5 mg at 12/17/17 0153     vancomycin (VANCOCIN) 1,750 mg in NaCl 0.9 % 500 mL intermittent infusion  1,750 mg Intravenous Q18H         multivitamin, therapeutic with minerals (THERA-VIT-M) tablet 1 tablet  1 tablet Oral Daily   1 tablet at 12/16/17 2007     No current Epic-ordered outpatient prescriptions on file.       Review of Systems:   C: NEGATIVE for fever, chills, change in weight  E/M: NEGATIVE for  "ear, mouth and throat problems  R: NEGATIVE for significant cough or SOB  CV: NEGATIVE for chest pain, palpitations or peripheral edema               Physical Exam:   Vitals were reviewed  Patient Vitals for the past 24 hrs:   BP Temp Temp src Heart Rate Resp SpO2 Height Weight   12/17/17 0717 - - - - - 93 % - -   12/17/17 0214 151/58 - - 54 - - - -   12/17/17 0107 (!) 182/70 98.5  F (36.9  C) Tympanic 62 16 93 % - -   12/16/17 1738 - - - - - 92 % - -   12/16/17 1442 162/62 98.9  F (37.2  C) Tympanic 82 16 92 % - -   12/16/17 1326 - - - - - 95 % 5' 11\" (1.803 m) -   12/16/17 1321 (!) 190/62 98.8  F (37.1  C) Oral 81 16 94 % - -   12/16/17 1237 - - - - 15 92 % - -   12/16/17 1230 175/74 - - 67 - (!) 91 % - -   12/16/17 1145 154/78 - - 75 - (!) 90 % - -   12/16/17 1130 155/70 - - 73 - (!) 89 % - -   12/16/17 1115 150/70 - - 76 - - - -   12/16/17 1114 - - - - 13 (!) 90 % - -   12/16/17 1100 169/72 - - 87 - (!) 90 % - -   12/16/17 1045 149/70 - - 74 - - - -   12/16/17 1042 - - - - 14 (!) 89 % - -   12/16/17 1030 154/70 - - 80 - - - -   12/16/17 1022 - - - - 21 (!) 91 % - -   12/16/17 1015 (!) 193/81 - - 96 - (!) 89 % - -   12/16/17 1000 (!) 190/94 - - 102 - 92 % - -   12/16/17 0954 175/92 - - 96 - (!) 90 % - -   12/16/17 0923 (!) 182/82 - - 86 - (!) 90 % - -   12/16/17 0913 175/80 98.5  F (36.9  C) Tympanic 90 20 92 % - 205 lb (93 kg)     Constitutional: Awake, less somnulent  Eyes:  sclera clear  Lungs: No increased work of breathing, good air exchange, clear to auscultation bilaterally, no crackles or wheezing.  Cardiovascular: Regular rate and rhythm, normal S1 and S2, no S3 or S4, and no murmur noted.  Abdomen:obese  Neurologic: Awake, alert, oriented to name  Neuropsychiatric: limited thought content/process  Ext-edema unchanged. Thick/dystrophic skin.  Very minimal decrease in erythema bilaterally    Peripheral IV 10/11/16 Right Upper arm (Active)   Number of days:432       Peripheral IV 12/16/17 Right Lower " "forearm (Active)   Site Assessment WDL 12/16/2017  3:45 PM   Line Status Infusing 12/16/2017  3:45 PM   Phlebitis Scale 0-->no symptoms 12/16/2017  3:45 PM   Infiltration Scale 0 12/16/2017  3:45 PM   Number of days:1       Urethral Catheter Double-lumen 16 fr (Active)   Number of days:432       Pressure Ulcer 08/02/16 Proximal Coccyx (Active)   Number of days:502       Pressure Ulcer 08/03/16 Right Hip reddened area with small opening-oozing (Active)   Number of days:501       Pressure Injury 10/11/16 Posterior Buttocks Right cheek cyst looking sore (Active)   Number of days:432       Pressure Injury 10/11/16 Posterior Perineum Sacrum, perineum, penis and scrotum (Active)   Number of days:432       Pressure Injury 10/12/16 Left;Distal;Posterior Leg Left lower leg/ankle approx.  5\" long wound (Active)   Number of days:431       Wound 02/12/16 Bilateral;Lower Leg Ulceration wounds existing for 4 years as result of traumatic fall; serosang drainage on BLE; eschar on left medial lower extremities 8 o'clock position; eschar on dorsal left foot (Active)   Site Assessment Eschar;Drainage;Red;Edema 12/17/2017  5:00 AM   Gena-wound Assessment Edema;Erythema 12/17/2017  5:00 AM   Drainage Amount Small 12/17/2017  5:00 AM   Drainage Color/Charcteristics Sanguinous 12/17/2017  5:00 AM   Wound Care/Cleansing Barrier applied  12/17/2017  5:00 AM   Dressing Dry gauze 12/17/2017  5:00 AM   Dressing Status Clean, dry, intact 12/17/2017  5:00 AM   Number of days:674       Wound 07/23/16 Bilateral;Lower Leg Ulceration chronic bilateral lower extremity venous stasis changes open weeping wound (serosang) on lateral aspect of LLE, weeping drainage from Left lateral heel wound.  (Active)   Number of days:512       Wound 12/16/17 Posterior;Proximal Coccyx Suspected pressure ulcer White wound base with blanchable erythema  (Active)   Site Assessment Red;White;Erythema, non-blanchable, skin intact 12/17/2017  1:07 AM   Gena-wound Assessment " Erythema 12/17/2017  1:07 AM   Wound Care/Cleansing Barrier applied  12/17/2017  1:07 AM   Number of days:1       Wound 12/16/17 Anterior;Left;Right Perineum Extravasation L and R extravasation perineal folds (Active)   Site Assessment Red 12/17/2017  1:07 AM   Gena-wound Assessment Erythema 12/17/2017  1:07 AM   Wound Care/Cleansing Other (Comment) 12/17/2017  1:07 AM   Dressing Open to air 12/17/2017  1:07 AM   Number of days:1     Line/device assessment(s) completed for medical necessity         Data:     Results for orders placed or performed during the hospital encounter of 12/16/17 (from the past 24 hour(s))   CBC with platelets differential   Result Value Ref Range    WBC 14.8 (H) 4.0 - 11.0 10e9/L    RBC Count 5.65 4.4 - 5.9 10e12/L    Hemoglobin 14.3 13.3 - 17.7 g/dL    Hematocrit 44.8 40.0 - 53.0 %    MCV 79 78 - 100 fl    MCH 25.3 (L) 26.5 - 33.0 pg    MCHC 31.9 31.5 - 36.5 g/dL    RDW 16.3 (H) 10.0 - 15.0 %    Platelet Count 279 150 - 450 10e9/L    Diff Method Automated Method     % Neutrophils 80.6 %    % Lymphocytes 8.8 %    % Monocytes 9.5 %    % Eosinophils 0.2 %    % Basophils 0.5 %    % Immature Granulocytes 0.4 %    Nucleated RBCs 0 0 /100    Absolute Neutrophil 11.9 (H) 1.6 - 8.3 10e9/L    Absolute Lymphocytes 1.3 0.8 - 5.3 10e9/L    Absolute Monocytes 1.4 (H) 0.0 - 1.3 10e9/L    Absolute Eosinophils 0.0 0.0 - 0.7 10e9/L    Absolute Basophils 0.1 0.0 - 0.2 10e9/L    Abs Immature Granulocytes 0.1 0 - 0.4 10e9/L    Absolute Nucleated RBC 0.0    Erythrocyte sedimentation rate auto   Result Value Ref Range    Sed Rate 11 0 - 20 mm/h   CRP inflammation   Result Value Ref Range    CRP Inflammation 20.4 (H) 0.0 - 8.0 mg/L   INR   Result Value Ref Range    INR 5.28 (HH) 0.80 - 1.20   Comprehensive metabolic panel   Result Value Ref Range    Sodium 144 133 - 144 mmol/L    Potassium 3.6 3.4 - 5.3 mmol/L    Chloride 111 (H) 94 - 109 mmol/L    Carbon Dioxide 21 20 - 32 mmol/L    Anion Gap 12 3 - 14 mmol/L     Glucose 100 (H) 70 - 99 mg/dL    Urea Nitrogen 27 7 - 30 mg/dL    Creatinine 1.12 0.66 - 1.25 mg/dL    GFR Estimate 64 >60 mL/min/1.7m2    GFR Estimate If Black 78 >60 mL/min/1.7m2    Calcium 8.8 8.5 - 10.1 mg/dL    Bilirubin Total 0.6 0.2 - 1.3 mg/dL    Albumin 3.2 (L) 3.4 - 5.0 g/dL    Protein Total 6.6 (L) 6.8 - 8.8 g/dL    Alkaline Phosphatase 96 40 - 150 U/L    ALT 22 0 - 70 U/L    AST 20 0 - 45 U/L   Magnesium   Result Value Ref Range    Magnesium 2.5 (H) 1.6 - 2.3 mg/dL   Lactic acid   Result Value Ref Range    Lactic Acid 1.4 0.4 - 2.0 mmol/L   Troponin I   Result Value Ref Range    Troponin I ES 0.141 (HH) 0.000 - 0.045 ug/L   Nt probnp inpatient (BNP)   Result Value Ref Range    N-Terminal Pro BNP Inpatient 5374 (H) 0 - 900 pg/mL   TSH   Result Value Ref Range    TSH 2.02 0.40 - 4.00 mU/L   Influenza A/B antigen   Result Value Ref Range    Influenza A/B Agn Specimen Nares     Influenza A Negative NEG^Negative    Influenza B Negative NEG^Negative   Blood culture   Result Value Ref Range    Specimen Description Blood     Special Requests Right Arm     Culture Micro No growth after 3 hours    Blood culture   Result Value Ref Range    Specimen Description Blood     Special Requests Left Arm     Culture Micro No growth after 3 hours    UA with Microscopic   Result Value Ref Range    Color Urine Yellow     Appearance Urine Clear     Glucose Urine Negative NEG^Negative mg/dL    Bilirubin Urine Small (A) NEG^Negative    Ketones Urine 80 (A) NEG^Negative mg/dL    Specific Gravity Urine 1.016 1.003 - 1.035    Blood Urine Moderate (A) NEG^Negative    pH Urine 6.0 4.7 - 8.0 pH    Protein Albumin Urine >600 (A) NEG^Negative mg/dL    Urobilinogen mg/dL 2.0 0.0 - 2.0 mg/dL    Nitrite Urine Negative NEG^Negative    Leukocyte Esterase Urine Negative NEG^Negative    Source Catheterized Urine     WBC Urine 2 0 - 2 /HPF    RBC Urine 5 (H) 0 - 2 /HPF    Bacteria Urine None (A) NEG^Negative /HPF    Mucous Urine Present (A)  NEG^Negative /LPF   Chest  XR, 1 view portable    Narrative    XR CHEST PORT 1 VW,    History: Male, age 72 years; cellulitis of legs with suggestion of  rales +/- CHF;     Comparison: None    Technique: Single view of the chest was obtained.    FINDINGS: The cardiac silhouette and pulmonary vasculature within  normal limits. The lungs are clear. No acute bony abnormality.      Impression    IMPRESSION:   1.  No evidence of acute or active cardiopulmonary disease.    MATTHEW DEL REAL MD   Troponin I   Result Value Ref Range    Troponin I ES 0.140 (HH) 0.000 - 0.045 ug/L   Troponin I   Result Value Ref Range    Troponin I ES 0.136 (HH) 0.000 - 0.045 ug/L   INR   Result Value Ref Range    INR 6.75 (HH) 0.80 - 1.20   Basic metabolic panel   Result Value Ref Range    Sodium 147 (H) 133 - 144 mmol/L    Potassium 2.9 (LL) 3.4 - 5.3 mmol/L    Chloride 113 (H) 94 - 109 mmol/L    Carbon Dioxide 24 20 - 32 mmol/L    Anion Gap 10 3 - 14 mmol/L    Glucose 94 70 - 99 mg/dL    Urea Nitrogen 27 7 - 30 mg/dL    Creatinine 1.06 0.66 - 1.25 mg/dL    GFR Estimate 68 >60 mL/min/1.7m2    GFR Estimate If Black 83 >60 mL/min/1.7m2    Calcium 8.1 (L) 8.5 - 10.1 mg/dL   CBC with platelets   Result Value Ref Range    WBC 11.6 (H) 4.0 - 11.0 10e9/L    RBC Count 4.86 4.4 - 5.9 10e12/L    Hemoglobin 12.4 (L) 13.3 - 17.7 g/dL    Hematocrit 38.2 (L) 40.0 - 53.0 %    MCV 79 78 - 100 fl    MCH 25.5 (L) 26.5 - 33.0 pg    MCHC 32.5 31.5 - 36.5 g/dL    RDW 15.9 (H) 10.0 - 15.0 %    Platelet Count 197 150 - 450 10e9/L     All cardiac studies reviewed by me.  All imaging studies reviewed by me.         Assessment and Plan:   Principal Problem:    Bilateral lower leg cellulitis  Continue iv antibiotics, wound care to see.   Continue w/ dressing changes  Active Problems:    Hemiparesis due to old cerebrovascular accident (H)    PT/OT to see regarding discharge plan    Delirium due to medical condition without behavioral disturbance   improving. Secondary  to illness and h/o CVA/TBI  No behavior/agitation issues    Chronic kidney dz  stable        Benign essential hypertension  bp still trending high. See orders. Pt on both ace and arb will d/c ace inhib.     Factor v leiden def-coumadin on hold. inr increased vit k ordered.     Hypokalemia-replacement ordered

## 2017-12-18 NOTE — PLAN OF CARE
Problem: Patient Care Overview  Goal: Plan of Care/Patient Progress Review  Outcome: No Change  Received orders for OT evaluation. Approached pt for evaluation. Pt reported he has been working with OT home care and that he has been getting assistance 2 hrs/day for ADLs. Pt plans to d/c home with continued OT services. Will defer OT evaluation at this time.

## 2017-12-18 NOTE — PHARMACY
[facility]    Pharmacy      Antimicrobial Stewardship Note     Current antimicrobial therapy:  Anti-infectives (Future)    Start     Dose/Rate Route Frequency Ordered Stop    12/17/17 0900  vancomycin (VANCOCIN) 1,750 mg in NaCl 0.9 % 500 mL intermittent infusion      1,750 mg  378.7 mL/hr over 90 Minutes Intravenous EVERY 18 HOURS 12/16/17 1514            Indication: SSTI     Pertinent labs:  Creatinine   Creatinine   Date Value Ref Range Status   12/18/2017 0.96 0.66 - 1.25 mg/dL Final   12/17/2017 1.06 0.66 - 1.25 mg/dL Final   12/16/2017 1.12 0.66 - 1.25 mg/dL Final     WBC   WBC   Date Value Ref Range Status   12/18/2017 10.7 4.0 - 11.0 10e9/L Final   12/17/2017 11.6 (H) 4.0 - 11.0 10e9/L Final   12/16/2017 14.8 (H) 4.0 - 11.0 10e9/L Final     Procalcitonin No results found for: PCAL  CRP   CRP Inflammation   Date Value Ref Range Status   12/16/2017 20.4 (H) 0.0 - 8.0 mg/L Final   10/12/2016 78.7 (H) 0.0 - 8.0 mg/L Final   08/02/2016 165.0 (H) 0.0 - 8.0 mg/L Final       Culture Results:     Urine Culture Aerobic Bacterial [[id number]] Collected: 12/16/17 1029       Order Status: Completed Specimen: Catheterized Urine Updated: 12/18/17 0855        Specimen Description Catheterized Urine        Culture Micro No growth       Blood culture [[id number]] Collected: 12/16/17 1022       Order Status: Completed Specimen: Blood Updated: 12/18/17 0655        Specimen Description Blood        Special Requests Left Arm        Culture Micro No growth after 2 days       Blood culture [[id number]] Collected: 12/16/17 1016       Order Status: Completed Specimen: Blood Updated: 12/18/17 0655        Specimen Description Blood        Special Requests Right Arm        Culture Micro No growth after 2 days       Influenza A/B antigen [[id number]] Collected: 12/16/17 1015       Order Status: Completed Specimen: Nares Updated: 12/16/17 1040        Influenza A/B Agn Specimen Nares        Influenza A Negative        Influenza B  Negative         Test results must be correlated with clinical data. If necessary, results   should be confirmed by a molecular assay or viral culture.               Recommendations/Interventions:  1. No recommendations at this time; current antimicrobial pharmacotherapy is appropriate.    Jace Holloway RPH  December 18, 2017

## 2017-12-18 NOTE — PROGRESS NOTES
"[facility]  Family Practice Progress Note               Interval History:   Pt is better today without specific complaints mental status almost baseline              Review of Systems:   The 5 point Review of Systems is negative other than noted in the HPI             Medications:   I have reviewed this patient's current medications               Physical Exam:   Vitals were reviewed  Lungs:   No increased work of breathing, good air exchange, clear to auscultation bilaterally, no crackles or wheezing     Cardiovascular:   Normal apical impulse, regular rate and rhythm, normal S1 and S2, no S3 or S4, and no murmur noted     Musculoskeletal:   Chronic skin changes from poor circulation. Thickened skin and chronic wounds in various stages of healing no induration or sharp demarcations             Peripheral IV 10/11/16 Right Upper arm (Active)   Number of days:433       Peripheral IV 12/16/17 Right Lower forearm (Active)   Site Assessment WDL 12/18/2017  1:55 AM   Line Status Infusing 12/18/2017  1:55 AM   Phlebitis Scale 0-->no symptoms 12/18/2017  1:55 AM   Infiltration Scale 0 12/18/2017  1:55 AM   Number of days:2       Urethral Catheter Double-lumen 16 fr (Active)   Number of days:433       Pressure Ulcer 08/02/16 Proximal Coccyx (Active)   Wound Base [place] 12/17/2017  9:25 AM   Number of days:503       Pressure Ulcer 08/03/16 Right Hip reddened area with small opening-oozing (Active)   Number of days:502       Pressure Injury 10/11/16 Posterior Buttocks Right cheek cyst looking sore (Active)   Number of days:433       Pressure Injury 10/11/16 Posterior Perineum Sacrum, perineum, penis and scrotum (Active)   Number of days:433       Pressure Injury 10/12/16 Left;Distal;Posterior Leg Left lower leg/ankle approx.  5\" long wound (Active)   Number of days:432       Wound 02/12/16 Bilateral;Lower Leg Ulceration wounds existing for 4 years as result of traumatic fall; serosang drainage on BLE; eschar on " left medial lower extremities 8 o'clock position; eschar on dorsal left foot (Active)   Site Assessment UTV 12/18/2017  1:55 AM   Gena-wound Assessment Edema;Erythema 12/17/2017  5:00 AM   Drainage Amount Scant 12/17/2017  9:25 AM   Drainage Color/Charcteristics Green 12/17/2017  9:25 AM   Wound Care/Cleansing Soap and water 12/17/2017  9:25 AM   Dressing Dry gauze 12/17/2017  9:25 AM   Dressing Status New dressing 12/17/2017  9:25 AM   Number of days:675       Wound 07/23/16 Bilateral;Lower Leg Ulceration chronic bilateral lower extremity venous stasis changes open weeping wound (serosang) on lateral aspect of LLE, weeping drainage from Left lateral heel wound.  (Active)   Number of days:513       Wound 12/16/17 Posterior;Proximal Coccyx Suspected pressure ulcer White wound base with blanchable erythema  (Active)   Site Assessment Bleeding;Pink;Red 12/18/2017  1:55 AM   Gena-wound Assessment Erythema;Excoriated 12/18/2017  1:55 AM   Wound Care/Cleansing Barrier applied  12/18/2017  1:55 AM   Number of days:2       Wound 12/16/17 Anterior;Left;Right Perineum Extravasation L and R extravasation perineal folds (Active)   Site Assessment Red;Pink 12/18/2017  1:55 AM   Gena-wound Assessment Erythema 12/18/2017  1:55 AM   Wound Care/Cleansing Other (Comment) 12/17/2017  1:07 AM   Dressing Open to air 12/18/2017  1:55 AM   Number of days:2     Line/device assessment(s) completed for medical necessity         Data:   All laboratory data reviewed         Assessment and Plan:   Principal Problem:    Bilateral lower leg cellulitis    Assessment: stable most likely MRSA but cultures are pending     Plan: will cover with vanc only for now pt is doing better and has some diarrhea from multiple antibiotics  Active Problems:    Hemiparesis due to old cerebrovascular accident (H)    Assessment: stable     Plan: will need cares for transfer and ADLS will cont current care    CKD (chronic kidney disease) stage 3, GFR 30-59 ml/min     Assessment: stable after hydration most likely dehydration is reason    Plan: will cont gentle hydration and correct lytes    Delirium due to medical condition without behavioral disturbance    Assessment: stable and improved multiple factors dementia infection or dehydration or combination of all    Plan: will cont IV antibiotics and follow cultures pt has improved already    Benign essential hypertension    Assessment: elevated today    Plan: will increase norvasc and follow     Hypokalemia    Assessment: stable     Plan: will start replacement and follow     Factor V Leiden mutation (H)    Assessment: stable INR 1.5    Plan: will do coumadin 2.5 tonight and follow    Hypoalbuminemia    Assessment: stable    Plan: will try to get pt to do supplements as outpt

## 2017-12-18 NOTE — PROGRESS NOTES
Checked in with Seun and wife, Lakshmi.  They deny questions or concerns.  [facility] Line transport will be needed upon discharge.

## 2017-12-18 NOTE — PROGRESS NOTES
"CLINICAL NUTRITION [facility]  -  ASSESSMENT NOTE    REASON FOR ASSESSMENT  Seun Emanuel is a seen by Registered Dietitian for Provider Order - Malnutrition    Patient has a history of COPD, CKD, hypothyroidism, HTN, CVA, and has some healing wounds. Spoke with patient, noted he may have some confusion. No family present at time of visit. Patient reports he is eating better here than he was at home. At home his intake is reduced because he forgets to eat. He has not noticed any weight loss over the past few months. Unable to determine recent weight changed, limited weight history documentation in chart. Per nurses note, patient has been coughing with thin liquids, but is fine drinking thickened liquids.    CURRENT NUTRITION ORDERS  Diet Order:  2000 mg Sodium   Current Intake/Tolerance: 50% x 1 and 75% x 1      ANTHROPOMETRICS  Height: 5' 11\"  Weight: 205 lbs 0 oz  Body mass index is 28.59 kg/(m^2).  Weight Status:  Overweight BMI 25-29.9  IBW: 172 lbs  Weight History: Patient reports usual body weight around 205 lbs.  8/15/2016 - 170 lbs      ASSESSED NUTRITION NEEDS PER APPROVED PRACTICE GUIDELINES:  Estimated Energy Needs: 1850- 2350 kcals (20-25 Kcal/Kg)  Estimated Protein Needs: 65-80 grams protein (0.8-1 g pro/Kg using IBW)      MALNUTRITION:  % Weight Loss:  None noted- limited data  % Intake:  Decreased intake does not meet criteria for malnutrition      Malnutrition Diagnosis: Unable to determine due to limited data.      NUTRITION DIAGNOSIS:  Increased protein needs related to wound healing as evidenced by multiple healing wounds documented.      Nutrition Suggestions  Jose once a day to promote wound healing.  Suggest 500 mg Vitamin C and 220 mg Zinc BID to promote wound healing.  Suggest speech evaluation if patient continues to have difficulty swallowing.    MONITORING AND EVALUATION:  Food intake, Weight trends and Biochemical data        "

## 2017-12-18 NOTE — PLAN OF CARE
"Problem: Patient Care Overview  Goal: Plan of Care/Patient Progress Review  Outcome: Improving  A&O. VSS/AF. Reports generalized \"overall \" pain but does not request pain medication this shift. Pleasant and cooperative. Dressings to BLE adressed by spouse this shift. Loose stools. Incontinent of urine. Turns. Skin conditions as charted.  Face to face report given with opportunity to observe patient.    Report given to Peter Jc   12/17/2017  11:59 PM        Problem: Infection, Risk/Actual (Adult)  Goal: Identify Related Risk Factors and Signs and Symptoms  Related risk factors and signs and symptoms are identified upon initiation of Human Response Clinical Practice Guideline (CPG).   Outcome: Improving   12/17/17 8208   Infection, Risk/Actual   Related Risk Factors (Infection, Risk/Actual) chronic illness/condition;skin integrity impairment;tissue perfusion altered   Signs and Symptoms (Infection, Risk/Actual) drainage;edema;lab value changes     Goal: Infection Prevention/Resolution  Patient will demonstrate the desired outcomes by discharge/transition of care.   Outcome: Improving   12/17/17 8858   Infection, Risk/Actual (Adult)   Infection Prevention/Resolution making progress toward outcome         "

## 2017-12-18 NOTE — PLAN OF CARE
"Problem: Patient Care Overview  Goal: Plan of Care/Patient Progress Review  Outcome: Improving  Reason for hospital stay:  Cellulitis        Most recent vitals: BP (!) 190/70  Temp 98.5  F (36.9  C) (Oral)  Resp 20  Ht 1.803 m (5' 11\")  Wt 93 kg (205 lb)  SpO2 93%  BMI 28.59 kg/m2    Pain Management:  Roxicodone for c/o buttocks/coccyx pain with relief    Orientation:  A/O answering appropriately    Cardiac:  WDL    Respiratory:  Clear throughout lung fields    GI:  Bowel sounds x4, held DSS per pt request    :  Clear straw colored urine, voiding in urinal with occasional incontinence.    Skin Issues:  Scar tissue noted to coccyx, bilat buttocks et surrounding coccyx reddened with dusky areas, all areas blanchable. Repositioned q2hrs et limited time in recliner. BLE tan, dry et crusty, unable to visualize wounds, dressing clean dry et intact. R knee cellulitis noted to be less red than previous day.    IVF:  NS 75ml/hr    ABX:  Vanco et Cleocin    Mobility:  Assist of 2 for transfers et repositioning    Safety:  A/O et calls for assist appropriately        Comments:        12/18/2017  12:01 PM  Elizabeth Caro        Face to face report given with opportunity to observe patient.    Report given to Kranthi HAWKINS et Leticia Caro   12/18/2017  3:48 PM    "

## 2017-12-18 NOTE — PLAN OF CARE
Problem: Patient Care Overview  Goal: Plan of Care/Patient Progress Review  Outcome: Improving   12/18/17 1673   OTHER   Plan Of Care Reviewed With patient   Plan of Care Review   Progress improving     VS as charted. Pt was much more alert and cooperative tonight. Turn and Repo every 2 hrs, still incontinent of urine at times and had 1 incontinent watery stool at beginning of shift. Pt scrotum, penis and periarea is red, excorciated, painful, blanchable. Barrier cream applied with every change. Pt lungs are clear, bowels are hyperactive.       Face to face report given with opportunity to observe patient.    Report given to ROSS Johnson and ROSS Daniel   12/18/2017  7:57 AM        Problem: Infection, Risk/Actual (Adult)  Goal: Identify Related Risk Factors and Signs and Symptoms  Related risk factors and signs and symptoms are identified upon initiation of Human Response Clinical Practice Guideline (CPG).   Outcome: Improving   12/17/17 7188   Infection, Risk/Actual   Related Risk Factors (Infection, Risk/Actual) chronic illness/condition;skin integrity impairment;tissue perfusion altered   Signs and Symptoms (Infection, Risk/Actual) drainage;edema;lab value changes

## 2017-12-19 NOTE — PHARMACY-VANCOMYCIN DOSING SERVICE
Pharmacy Vancomycin Note  Date of Service 2017  Patient's  1945   72 year old, male    Indication: Skin and Soft Tissue Infection  Goal Trough Level: 15-20 mg/L  Day of Therapy: 3  Current Vancomycin regimen:  1750 mg IV q18h    Current estimated CrCl = Estimated Creatinine Clearance: 79.9 mL/min (based on Cr of 0.96).    Creatinine for last 3 days  2017:  9:30 AM Creatinine 1.12 mg/dL  2017:  5:31 AM Creatinine 1.06 mg/dL  2017:  5:56 AM Creatinine 0.96 mg/dL    Recent Vancomycin Levels (past 3 days)  2017:  7:56 PM Vancomycin Level 27.4 mg/L    Vancomycin IV Administrations (past 72 hours)                   vancomycin (VANCOCIN) 1,750 mg in NaCl 0.9 % 500 mL intermittent infusion (mg) 1,750 mg New Bag 17 0339     1,750 mg New Bag 17 0932                Nephrotoxins and other renal medications (Future)    Start     Dose/Rate Route Frequency Ordered Stop    17 0300  vancomycin (VANCOCIN) 1,750 mg in NaCl 0.9 % 500 mL intermittent infusion      1,750 mg  378.7 mL/hr over 90 Minutes Intravenous EVERY 24 HOURS 17 1300  torsemide (DEMADEX) tablet 20 mg      20 mg Oral DAILY 17 1257               Contrast Orders - past 72 hours     None          Interpretation of levels and current regimen:  Trough level is  Supratherapeutic    Has serum creatinine changed > 50% in last 72 hours: No    Renal Function: Improving    Plan:  1.  Decrease Dose to 1750 mg IV every 24 hours  2.  Pharmacy will check trough levels as appropriate in 1-3 Days.    3. Serum creatinine levels will be ordered daily for the first week of therapy and at least twice weekly for subsequent weeks.      Lolis Gruber        .

## 2017-12-19 NOTE — PROGRESS NOTES
Checked in with wife, Lakshmi, she denies questions or concerns.  Ride with [facility] arranged per request.  They will stop at his house at 2 pm to  his wheel chair and then come pick him up.  Updated Lakshmi and Sara HAWKINS.      Name: Seun Emanuel    MRN#: [id number]    Reason for Hospitalization: Chronic pain syndrome [G89.4]  Delirium [R41.0]  Cellulitis of both lower extremities [L03.115, L03.116]    EUGENE: low    Discharge Date: 12/19/2017    Patient / Family response to discharge plan: agreeable    Follow-Up Appt: No future appointments.    Other Providers (Care Coordinator, County Services, PCA services etc): Yes: Zana at [facility]; left message    Discharge Disposition: home    Staci Partida

## 2017-12-19 NOTE — DISCHARGE SUMMARY
[facility]    Discharge Summary  Hospitalist    Date of Admission:  12/16/2017  Date of Discharge:  12/19/2017  Discharging Provider: Herbie Peter  Date of Service (when I saw the patient): 12/19/17      Hospital Course   Seun Emanuel was admitted on 12/16/2017.  The following problems were addressed during his hospitalization:    Principal Problem:    Bilateral lower leg cellulitis    Assessment: stable slight iimprovment    Plan: will dc with clindamycine and regular antibiotic solution for dressing changes and follow him  Active Problems:    Hemiparesis due to old cerebrovascular accident (H)    Assessment: stable no new issues    Plan: will cont with current meds    CKD (chronic kidney disease) stage 3, GFR 30-59 ml/min    Assessment: stable for now     Plan: will need to monitor water intake as outpt to keep stable with meds    Delirium due to medical condition without behavioral disturbance    Assessment: resolved     Plan: will cont with antibiotics and follow    Benign essential hypertension    Assessment: improved with new meds    Plan: will cont norvasc as outpt and monitor    Hypokalemia    Assessment: resolved    Plan: will need to monitor as outpt    Factor V Leiden mutation (H)    Assessment: stable     Plan: cont coumadin and follow with inr    Hypoalbuminemia    Assessment: stable     Plan: will try supplements as outpt usually pt will not take them      Herbie Peter    Significant Results and Procedures       Pending Results   These results will be followed up by bruno  Unresulted Labs Ordered in the Past 30 Days of this Admission     Date and Time Order Name Status Description    12/16/2017 1006 Blood culture Preliminary     12/16/2017 1006 Blood culture Preliminary           Code Status   DNR / DNI       Primary Care Physician   Herbie Peter    Physical Exam   Temp: 98.9  F (37.2  C) Temp src: Tympanic BP: 170/68   Heart Rate: 64 Resp: 20 SpO2: (!) 91 % O2  Device: None (Room air)    Vitals:    12/16/17 0913   Weight: 93 kg (205 lb)     Vital Signs with Ranges  Temp:  [98.7  F (37.1  C)-98.9  F (37.2  C)] 98.9  F (37.2  C)  Heart Rate:  [64] 64  Resp:  [18-20] 20  BP: (168-189)/(64-76) 170/68  SpO2:  [91 %-95 %] 91 %  I/O last 3 completed shifts:  In: 2410 [P.O.:960; I.V.:1450]  Out: 700 [Urine:700]    Respiratory: No increased work of breathing, good air exchange, clear to auscultation bilaterally, no crackles or wheezing  Cardiovascular: Normal apical impulse, regular rate and rhythm, normal S1 and S2, no S3 or S4, and no murmur noted  Musculoskeletal: chronic skin changes from arteriole and veinous compromise will cont with wound care pt most likely will not heal has been treating and refuses surgery  Neuropsychiatric: back to baseline no confusion wants to go home    Discharge Disposition   Discharged to home  Condition at discharge: Stable    Consultations This Hospital Stay   PHYSICAL THERAPY ADULT IP CONSULT  PHARMACY TO DOSE VANCO  NUTRITION SERVICES ADULT IP CONSULT  OCCUPATIONAL THERAPY ADULT IP CONSULT  PHARMACY TO DOSE WARFARIN    Time Spent on this Encounter   I, Herbie Peter, personally saw the patient today and spent greater than 30 minutes discharging this patient.    Discharge Orders     Follow-up and recommended labs and tests    Follow up with primary care provider, Herbie Peter, within 2 weeks, for hospital follow- up. No follow up labs or test are needed.     Activity   Your activity upon discharge: activity as tolerated     DNR/DNI     Diet   Follow this diet upon discharge: Regular       Discharge Medications   Current Discharge Medication List      START taking these medications    Details   amLODIPine (NORVASC) 10 MG tablet Take 0.5 tablets (5 mg) by mouth daily  Qty: 30 tablet, Refills: 1    Associated Diagnoses: Benign essential hypertension      clindamycin (CLEOCIN) 300 MG capsule Take 1 capsule (300 mg) by mouth 3 times  daily for 7 days  Qty: 21 capsule, Refills: 0    Associated Diagnoses: Cellulitis of both lower extremities         CONTINUE these medications which have NOT CHANGED    Details   RISPERIDONE PO Take 0.25 mg by mouth      !! fluticasone (FLONASE) 50 MCG/ACT spray Spray 2 sprays into both nostrils 2 times daily  Qty: 1 Bottle, Refills: 0      LOSARTAN POTASSIUM PO Take 100 mg by mouth daily      !! OXYCODONE HCL PO Take 10 mg by mouth every 8 hours as needed      !! OXYCODONE HCL PO Take 15 mg by mouth every 8 hours as needed      warfarin (COUMADIN) 2.5 MG tablet Take 1 tablet (2.5 mg) by mouth daily Tuesday, Thursday  Qty: 30 tablet, Refills: 1    Associated Diagnoses: Confusion associated with infection      saccharomyces boulardii (FLORASTOR) 250 MG capsule Take 1 capsule (250 mg) by mouth 2 times daily  Qty: 14 capsule, Refills: 0    Associated Diagnoses: Diarrhea      !! order for DME Equipment being ordered: Other: rollo cushion and arm sling  Treatment Diagnosis: stage 2 pressure sores and left should pain and OA of ac joint  Qty: 1 Device, Refills: 0    Associated Diagnoses: Hemiparesis due to old cerebrovascular accident (H); Arthritis of left shoulder region      HYDROcodone-acetaminophen (NORCO)  MG per tablet Take 1 tablet by mouth every 6 hours as needed for moderate to severe pain  Qty: 60 tablet, Refills: 0    Associated Diagnoses: Cellulitis and abscess of leg      ZOLPIDEM TARTRATE PO Take 10 mg by mouth nightly as needed       ASPIRIN PO Take 81 mg by mouth daily      albuterol (2.5 MG/3ML) 0.083% nebulizer solution Take 1 vial by nebulization every 2 hours as needed for shortness of breath / dyspnea or wheezing      lisinopril (PRINIVIL,ZESTRIL) 10 MG tablet Take 1 tablet (10 mg) by mouth daily  Qty: 30 tablet, Refills: 0      !! fluticasone (FLONASE) 50 MCG/ACT nasal spray Spray 1-2 sprays into both nostrils daily  Qty: 1 Package, Refills: 0      !! ORDER FOR DME Equipment being ordered:  Other: **Rook boots for chronic stasis dermatitis and ulcers.*  Treatment Diagnosis: *CKD3 , chronic edema with ulcers**  Qty: 1 Package, Refills: 2    Associated Diagnoses: Ulcers of both lower legs, limited to breakdown of skin (H)      !! ORDER FOR DME Equipment being ordered: Bed Cradle  Qty: 1 Device, Refills: 0    Associated Diagnoses: Cellulitis; Ulcers of both lower legs, with unspecified severity      Levothyroxine Sodium (SYNTHROID PO) Take 125 mcg by mouth daily       fluticasone-salmeterol (ADVAIR) 250-50 MCG/DOSE diskus inhaler Inhale 1 puff into the lungs 2 times daily      !! ORDER FOR DME Equipment being ordered: Other: *Neb machine**  Treatment Diagnosis: *COPD**  Qty: 1 Device, Refills: 0    Associated Diagnoses: COPD exacerbation (H)      METOPROLOL SUCCINATE ER PO Take 100 mg by mouth daily      TORSEMIDE PO Take 20 mg by mouth daily       BACLOFEN PO Take 10 mg by mouth 2 times daily       Gabapentin (NEURONTIN PO) Take 100 mg by mouth 3 times daily       ALBUTEROL INHALER 17GM Inhale 2 puffs into the lungs every 4 hours as needed This product no longer available      diphenhydrAMINE-zinc acetate (BENADRYL) cream Apply 2 g topically 3 times daily as needed for itching (for itching legs)      lidocaine (XYLOCAINE) 2 % jelly Apply 1 Tube topically as needed for moderate pain (apply to itching legs 3-4 times daily)      nicotine (NICODERM CQ) 21 MG/24HR patch 2h hr Place 1 patch onto the skin every 24 hours  Qty: 30 patch, Refills: 0    Associated Diagnoses: Cellulitis and abscess of leg      fentaNYL (DURAGESIC) 100 mcg/hr patch 72 hr Place 1 patch onto the skin every 72 hours      ACETAMINOPHEN PO Take 650 mg by mouth every 6 hours as needed for pain      DOCUSATE SODIUM PO Take 100 mg by mouth 2 times daily      HYDROXYZINE HCL PO Take 50 mg by mouth 3 times daily as needed for itching      COLCHICINE PO Take 0.6 mg by mouth daily as needed       !! - Potential duplicate medications found.  Please discuss with provider.        Allergies   Allergies   Allergen Reactions     Bactroban [Mupirocin]      Indapamide      Lasix      Takes torsemide at home  Unknown       Sulfa Drugs      Vomiting, can't breathe       Data   Most Recent 3 CBC's:  Recent Labs   Lab Test  12/18/17   0556  12/17/17   0531  12/16/17   0930   WBC  10.7  11.6*  14.8*   HGB  12.2*  12.4*  14.3   MCV  78  79  79   PLT  170  197  279      Most Recent 3 BMP's:  Recent Labs   Lab Test  12/19/17   0528  12/18/17   1137  12/18/17   0556   12/17/17   0531  12/16/17   0930   NA   --    --   142   --   147*  144   POTASSIUM  3.7  4.4  3.2*   < >  2.9*  3.6   CHLORIDE   --    --   111*   --   113*  111*   CO2   --    --   23   --   24  21   BUN   --    --   20   --   27  27   CR  0.94   --   0.96   --   1.06  1.12   ANIONGAP   --    --   8   --   10  12   RASHEED   --    --   7.7*   --   8.1*  8.8   GLC   --    --   93   --   94  100*    < > = values in this interval not displayed.     Most Recent 2 LFT's:  Recent Labs   Lab Test  12/16/17   0930  10/11/16   1806   AST  20  15   ALT  22  19   ALKPHOS  96  74   BILITOTAL  0.6  0.3     Most Recent INR's and Anticoagulation Dosing History:  Anticoagulation Dose History     Recent Dosing and Labs Latest Ref Rng & Units 10/12/2016 10/12/2016 10/13/2016 12/16/2017 12/17/2017 12/18/2017 12/19/2017    Warfarin 2.5 mg - - 2.5 mg - - - 2.5 mg -    INR 0.80 - 1.20 2.03(H) - 1.89(H) 5.28(HH) 6.75(HH) 1.54(H) 1.09        Most Recent 3 Troponin's:  Recent Labs   Lab Test  12/16/17   2257  12/16/17   1410  12/16/17   0930   TROPI  0.136*  0.140*  0.141*     Most Recent Cholesterol Panel:No lab results found.  Most Recent 6 Bacteria Isolates From Any Culture (See EPIC Reports for Culture Details):  Recent Labs   Lab Test  12/16/17   1029  12/16/17   1022  12/16/17   1016  10/11/16   1825  08/02/16   1200  08/02/16   1125   CULT  No growth  No growth after 3 days  No growth after 3 days  Light growth  Staphylococcus aureus  Light growth Serratia marcescens  *  No growth after 6 days  No growth after 6 days     Most Recent TSH, T4 and A1c Labs:  Recent Labs   Lab Test  12/16/17   0930   01/06/15   0540   TSH  2.02   < >  6.88*   T4   --    --   1.22    < > = values in this interval not displayed.

## 2017-12-19 NOTE — PHARMACY
[facility]    Pharmacy      Antimicrobial Stewardship Note     Current antimicrobial therapy:  Anti-infectives (Future)    Start     Dose/Rate Route Frequency Ordered Stop    12/19/17 0300  vancomycin (VANCOCIN) 1,750 mg in NaCl 0.9 % 500 mL intermittent infusion      1,750 mg  378.7 mL/hr over 90 Minutes Intravenous EVERY 24 HOURS 12/18/17 2032            Indication: SSTI     Pertinent labs:  Creatinine   Creatinine   Date Value Ref Range Status   12/19/2017 0.94 0.66 - 1.25 mg/dL Final   12/18/2017 0.96 0.66 - 1.25 mg/dL Final   12/17/2017 1.06 0.66 - 1.25 mg/dL Final     WBC   WBC   Date Value Ref Range Status   12/18/2017 10.7 4.0 - 11.0 10e9/L Final   12/17/2017 11.6 (H) 4.0 - 11.0 10e9/L Final   12/16/2017 14.8 (H) 4.0 - 11.0 10e9/L Final     Procalcitonin No results found for: PCAL  CRP   CRP Inflammation   Date Value Ref Range Status   12/16/2017 20.4 (H) 0.0 - 8.0 mg/L Final   10/12/2016 78.7 (H) 0.0 - 8.0 mg/L Final   08/02/2016 165.0 (H) 0.0 - 8.0 mg/L Final       Culture Results:     Blood culture [[id number]] Collected: 12/16/17 1022       Order Status: Completed Specimen: Blood Updated: 12/19/17 0610        Specimen Description Blood        Special Requests Left Arm        Culture Micro No growth after 3 days       Blood culture [[id number]] Collected: 12/16/17 1016       Order Status: Completed Specimen: Blood Updated: 12/19/17 0610        Specimen Description Blood        Special Requests Right Arm        Culture Micro No growth after 3 days       Urine Culture Aerobic Bacterial [[id number]] Collected: 12/16/17 1029       Order Status: Completed Specimen: Catheterized Urine Updated: 12/18/17 0855        Specimen Description Catheterized Urine        Culture Micro No growth       Influenza A/B antigen [[id number]] Collected: 12/16/17 1015       Order Status: Completed Specimen: Nares Updated: 12/16/17 1040        Influenza A/B Agn Specimen Nares        Influenza A Negative        Influenza B  Negative         Test results must be correlated with clinical data. If necessary, results   should be confirmed by a molecular assay or viral culture.               Recommendations/Interventions:  1. No recommendations at this time; current antimicrobial pharmacotherapy is appropriate.    Jace Holloway RPH  December 19, 2017

## 2017-12-19 NOTE — PLAN OF CARE
"Problem: Patient Care Overview  Goal: Individualization & Mutuality  Outcome: No Change  Patient awake most of shift. Given PRN Ambien to aide in sleep. Patient refuses to rate pain on a 0-10 scale. States \"I wont tell you all Im going to say is it hurts a lot.\" Given PRN oxy for pain control with positive outcomes. Vitals remain stable. /64  Temp 98.9  F (37.2  C) (Tympanic)  Resp 18  Ht 1.803 m (5' 11\")  Wt 93 kg (205 lb)  SpO2 95%  BMI 28.59 kg/m2. Dressings remain intact to lower extremities with no noted drainage. No major behaviors noted on shift. Patient increasingly agitated and confused as the night goes on. Continues to be turned and repositioned every 2 hours as patient allows. IV fluids continue to infuse. Antibiotic regimen continues per order.       Problem: Infection, Risk/Actual (Adult)  Goal: Identify Related Risk Factors and Signs and Symptoms  Related risk factors and signs and symptoms are identified upon initiation of Human Response Clinical Practice Guideline (CPG).   Outcome: No Change   12/18/17 2332   Infection, Risk/Actual   Related Risk Factors (Infection, Risk/Actual) chronic illness/condition;skin integrity impairment;tissue perfusion altered   Signs and Symptoms (Infection, Risk/Actual) edema;pain       Face to face report given with opportunity to observe patient.    Report given to ROSS Johnson and ROSS Ruiz   12/19/2017  7:17 AM      "

## 2017-12-19 NOTE — PLAN OF CARE
"Problem: Patient Care Overview  Goal: Plan of Care/Patient Progress Review  Outcome: Adequate for Discharge Date Met: 12/19/17  Reason for hospital stay:  Cellulitis        Most recent vitals: /68  Temp 98.6  F (37  C)  Resp 20  Ht 1.803 m (5' 11\")  Wt 93 kg (205 lb)  SpO2 (!) 91%  BMI 28.59 kg/m2    Pain Management:  Adequate with use of Roxicodone    Orientation:  Alert    Cardiac:  WDL    Respiratory:  WDL lungs clear throughout    GI:  Bowel sounds x4 active    :  Using urinal et also incontinent at times    Skin Issues:  BLE  Tan, dry, et crusty, with wound to L lateral LE scant drainage noted to dressing et skin tear to R lateral calf from scratching. Buttocks reddened et blanchable with light dusky color noted. Coccyx noted to have scar tissue    IVF:  NS 75ml/hr    ABX:  Vanco    Mobility:  Assist of 2 for transfers et bed mobility    Safety:  Alarms on et activated        Comments:        12/19/2017  3:07 PM  Elizabeth Caro          "

## 2017-12-19 NOTE — PLAN OF CARE
Problem: Patient Care Overview  Goal: Plan of Care/Patient Progress Review  Outcome: No Change  A/O. VSS, afebrile. Patient reported pain at 8/10. Given roxicodone x1 this shift. Pt then reported a decrease in pain. Dressing dry, intact on LE's. Turned/repo q2h. Pt became more confused toward end of shift but still cooperative.    12/18/17 2332   OTHER   Plan Of Care Reviewed With patient   Plan of Care Review   Progress no change       Problem: Infection, Risk/Actual (Adult)  Goal: Identify Related Risk Factors and Signs and Symptoms  Related risk factors and signs and symptoms are identified upon initiation of Human Response Clinical Practice Guideline (CPG).   Outcome: No Change   12/18/17 2332   Infection, Risk/Actual   Related Risk Factors (Infection, Risk/Actual) chronic illness/condition;skin integrity impairment;tissue perfusion altered   Signs and Symptoms (Infection, Risk/Actual) edema;pain     Goal: Infection Prevention/Resolution  Patient will demonstrate the desired outcomes by discharge/transition of care.   Outcome: No Change   12/18/17 2332   Infection, Risk/Actual (Adult)   Infection Prevention/Resolution making progress toward outcome       Face to face report given with opportunity to observe patient.    Report given to ROSS Gottlieb   12/18/2017  11:45 PM

## 2017-12-19 NOTE — DISCHARGE INSTRUCTIONS
Per your request of scheduling your own appointment we have not set up a  follow up appointment. Please schedule an appointment to see Dr. Peter in two weeks.

## 2017-12-19 NOTE — PLAN OF CARE
Patient discharged at 3:13 PM via wheel chair accompanied by spouse and staff. Prescriptions sent to patients preferred pharmacy. All belongings sent with patient.     Discharge instructions reviewed with Patient et Spouse. Listed belongings gathered and returned to patient. yes    Patient discharged to home.   Report called to n/a    Core Measures and Vaccines  Core Measures applicable during stay: No. If yes, state diagnosis: n/a  Pneumonia and Influenza given prior to discharge, if indicated: N/A    Surgical Patient   Surgical Procedures during stay: n/a  Did patient receive discharge instruction on wound care and recognition of infection symptoms? N/A    MISC  Follow up appointment made:  No-spouse requests to set up own appt.  Home and hospital aquired medications returned to patient: N/A  Patient reports pain was well managed at discharge: Yes

## 2017-12-20 NOTE — TELEPHONE ENCOUNTER
Senu Emanuel, was discharged to home on 12/19/17   from [facility]. I spoke today with his wife Lakshmi  regarding his  discharge.   She indicates she has receive(d) sufficient information upon discharge. Medications were reviewed in full on discharge, including: Medications to be started;  medications to be continued from preadmission and any side effects. Prescriptions were received at discharge and were able to be filled. Medications are being taken as prescribed.   She indicates she knows she needs to make a follow up appointment and stated she will get that done.  She did not have any questions regarding discharge instructions or condition.  Per their request, the following employee (s) can be recognized for their outstanding services delivered:  Everyone always does a good job caring for her . They all work very hard.  Suggestions to improve service: nothing indicated  She was informed they may receive a survey in the mail from the hospital. Asked if they would kindly complete the survey in order for [facility] to know if services fully met patient needs.

## 2018-01-01 ENCOUNTER — HOSPITAL ENCOUNTER (INPATIENT)
Facility: HOSPITAL | Age: 73
LOS: 5 days | Discharge: HOSPICE | DRG: 579 | End: 2018-04-18
Attending: FAMILY MEDICINE | Admitting: INTERNAL MEDICINE
Payer: MEDICARE

## 2018-01-01 ENCOUNTER — HOSPITAL ENCOUNTER (INPATIENT)
Facility: HOSPITAL | Age: 73
LOS: 4 days | Discharge: HOME HEALTH | DRG: 602 | End: 2018-02-16
Attending: FAMILY MEDICINE | Admitting: INTERNAL MEDICINE
Payer: MEDICARE

## 2018-01-01 ENCOUNTER — HOSPITAL ENCOUNTER (EMERGENCY)
Facility: HOSPITAL | Age: 73
Discharge: HOME | End: 2018-03-27
Attending: PHYSICIAN ASSISTANT | Admitting: PHYSICIAN ASSISTANT
Payer: MEDICARE

## 2018-01-01 ENCOUNTER — APPOINTMENT (OUTPATIENT)
Dept: WOUND CARE | Facility: HOSPITAL | Age: 73
DRG: 579 | End: 2018-01-01
Payer: MEDICARE

## 2018-01-01 ENCOUNTER — ANESTHESIA (OUTPATIENT)
Dept: MEDSURG UNIT | Facility: HOSPITAL | Age: 73
DRG: 579 | End: 2018-01-01
Payer: MEDICARE

## 2018-01-01 ENCOUNTER — ANESTHESIA (OUTPATIENT)
Dept: SURGERY | Facility: HOSPITAL | Age: 73
DRG: 579 | End: 2018-01-01
Payer: MEDICARE

## 2018-01-01 ENCOUNTER — APPOINTMENT (OUTPATIENT)
Dept: GENERAL RADIOLOGY | Facility: HOSPITAL | Age: 73
End: 2018-01-01
Attending: PHYSICIAN ASSISTANT
Payer: MEDICARE

## 2018-01-01 ENCOUNTER — MEDICAL CORRESPONDENCE (OUTPATIENT)
Dept: HEALTH INFORMATION MANAGEMENT | Facility: HOSPITAL | Age: 73
End: 2018-01-01

## 2018-01-01 ENCOUNTER — ANESTHESIA EVENT (OUTPATIENT)
Dept: SURGERY | Facility: HOSPITAL | Age: 73
DRG: 579 | End: 2018-01-01
Payer: MEDICARE

## 2018-01-01 ENCOUNTER — ANESTHESIA (OUTPATIENT)
Dept: EMERGENCY MEDICINE | Facility: HOSPITAL | Age: 73
End: 2018-01-01
Payer: MEDICARE

## 2018-01-01 ENCOUNTER — TELEPHONE (OUTPATIENT)
Dept: CASE MANAGEMENT | Facility: HOSPITAL | Age: 73
End: 2018-01-01

## 2018-01-01 ENCOUNTER — ANESTHESIA EVENT (OUTPATIENT)
Dept: EMERGENCY MEDICINE | Facility: HOSPITAL | Age: 73
End: 2018-01-01
Payer: MEDICARE

## 2018-01-01 ENCOUNTER — APPOINTMENT (OUTPATIENT)
Dept: CT IMAGING | Facility: HOSPITAL | Age: 73
End: 2018-01-01
Attending: PHYSICIAN ASSISTANT
Payer: MEDICARE

## 2018-01-01 ENCOUNTER — DOCUMENTATION ONLY (OUTPATIENT)
Dept: FAMILY MEDICINE | Facility: OTHER | Age: 73
End: 2018-01-01

## 2018-01-01 ENCOUNTER — ANESTHESIA EVENT (OUTPATIENT)
Dept: MEDSURG UNIT | Facility: HOSPITAL | Age: 73
DRG: 579 | End: 2018-01-01
Payer: MEDICARE

## 2018-01-01 VITALS
WEIGHT: 175.49 LBS | HEIGHT: 71 IN | TEMPERATURE: 99 F | RESPIRATION RATE: 16 BRPM | BODY MASS INDEX: 24.57 KG/M2 | DIASTOLIC BLOOD PRESSURE: 74 MMHG | OXYGEN SATURATION: 95 % | SYSTOLIC BLOOD PRESSURE: 158 MMHG

## 2018-01-01 VITALS
WEIGHT: 179.23 LBS | RESPIRATION RATE: 18 BRPM | SYSTOLIC BLOOD PRESSURE: 191 MMHG | HEIGHT: 71 IN | HEART RATE: 70 BPM | BODY MASS INDEX: 25.09 KG/M2 | OXYGEN SATURATION: 94 % | DIASTOLIC BLOOD PRESSURE: 76 MMHG | TEMPERATURE: 99.8 F

## 2018-01-01 VITALS
RESPIRATION RATE: 20 BRPM | TEMPERATURE: 98.4 F | DIASTOLIC BLOOD PRESSURE: 92 MMHG | SYSTOLIC BLOOD PRESSURE: 196 MMHG | OXYGEN SATURATION: 100 % | HEART RATE: 65 BPM

## 2018-01-01 DIAGNOSIS — I10 BENIGN ESSENTIAL HYPERTENSION: ICD-10-CM

## 2018-01-01 DIAGNOSIS — B99.9 CONFUSION ASSOCIATED WITH INFECTION: ICD-10-CM

## 2018-01-01 DIAGNOSIS — R41.0 CONFUSION ASSOCIATED WITH INFECTION: ICD-10-CM

## 2018-01-01 DIAGNOSIS — R41.0 DISORIENTATION: ICD-10-CM

## 2018-01-01 DIAGNOSIS — L08.9: ICD-10-CM

## 2018-01-01 DIAGNOSIS — L05.91 PILONIDAL CYST: ICD-10-CM

## 2018-01-01 DIAGNOSIS — L98.493: ICD-10-CM

## 2018-01-01 DIAGNOSIS — D68.51 FACTOR V LEIDEN MUTATION (H): Chronic | ICD-10-CM

## 2018-01-01 DIAGNOSIS — L03.119 CELLULITIS OF LOWER EXTREMITY, UNSPECIFIED LATERALITY: ICD-10-CM

## 2018-01-01 DIAGNOSIS — A41.9 BACTERIAL SEPSIS (H): ICD-10-CM

## 2018-01-01 DIAGNOSIS — R93.0 ABNORMAL CT SCAN, HEAD: ICD-10-CM

## 2018-01-01 DIAGNOSIS — Z51.5 END OF LIFE CARE: Primary | ICD-10-CM

## 2018-01-01 DIAGNOSIS — R41.82 ALTERED MENTAL STATUS, UNSPECIFIED ALTERED MENTAL STATUS TYPE: ICD-10-CM

## 2018-01-01 DIAGNOSIS — E87.6 HYPOKALEMIA: Primary | ICD-10-CM

## 2018-01-01 LAB
ALBUMIN SERPL-MCNC: 2.4 G/DL (ref 3.4–5)
ALBUMIN SERPL-MCNC: 3 G/DL (ref 3.4–5)
ALBUMIN SERPL-MCNC: 3.4 G/DL (ref 3.4–5)
ALBUMIN UR-MCNC: 300 MG/DL
ALBUMIN UR-MCNC: >600 MG/DL
ALP SERPL-CCNC: 101 U/L (ref 40–150)
ALP SERPL-CCNC: 84 U/L (ref 40–150)
ALP SERPL-CCNC: 89 U/L (ref 40–150)
ALT SERPL W P-5'-P-CCNC: 15 U/L (ref 0–70)
ALT SERPL W P-5'-P-CCNC: 21 U/L (ref 0–70)
ALT SERPL W P-5'-P-CCNC: 24 U/L (ref 0–70)
ANION GAP SERPL CALCULATED.3IONS-SCNC: 10 MMOL/L (ref 3–14)
ANION GAP SERPL CALCULATED.3IONS-SCNC: 10 MMOL/L (ref 3–14)
ANION GAP SERPL CALCULATED.3IONS-SCNC: 11 MMOL/L (ref 3–14)
ANION GAP SERPL CALCULATED.3IONS-SCNC: 4 MMOL/L (ref 3–14)
ANION GAP SERPL CALCULATED.3IONS-SCNC: 6 MMOL/L (ref 3–14)
ANION GAP SERPL CALCULATED.3IONS-SCNC: 7 MMOL/L (ref 3–14)
ANION GAP SERPL CALCULATED.3IONS-SCNC: 8 MMOL/L (ref 3–14)
ANION GAP SERPL CALCULATED.3IONS-SCNC: 9 MMOL/L (ref 3–14)
ANION GAP SERPL CALCULATED.3IONS-SCNC: 9 MMOL/L (ref 3–14)
APPEARANCE UR: CLEAR
APPEARANCE UR: CLEAR
AST SERPL W P-5'-P-CCNC: 11 U/L (ref 0–45)
AST SERPL W P-5'-P-CCNC: 15 U/L (ref 0–45)
AST SERPL W P-5'-P-CCNC: 16 U/L (ref 0–45)
BACTERIA #/AREA URNS HPF: ABNORMAL /HPF
BACTERIA #/AREA URNS HPF: ABNORMAL /HPF
BACTERIA SPEC CULT: ABNORMAL
BACTERIA SPEC CULT: NO GROWTH
BACTERIA SPEC CULT: NO GROWTH
BACTERIA SPEC CULT: NORMAL
BASOPHILS # BLD AUTO: 0 10E9/L (ref 0–0.2)
BASOPHILS # BLD AUTO: 0.1 10E9/L (ref 0–0.2)
BASOPHILS NFR BLD AUTO: 0.4 %
BASOPHILS NFR BLD AUTO: 0.5 %
BASOPHILS NFR BLD AUTO: 0.6 %
BASOPHILS NFR BLD AUTO: 0.7 %
BILIRUB SERPL-MCNC: 0.6 MG/DL (ref 0.2–1.3)
BILIRUB SERPL-MCNC: 0.6 MG/DL (ref 0.2–1.3)
BILIRUB SERPL-MCNC: 0.7 MG/DL (ref 0.2–1.3)
BILIRUB UR QL STRIP: NEGATIVE
BILIRUB UR QL STRIP: NEGATIVE
BUN SERPL-MCNC: 10 MG/DL (ref 7–30)
BUN SERPL-MCNC: 12 MG/DL (ref 7–30)
BUN SERPL-MCNC: 14 MG/DL (ref 7–30)
BUN SERPL-MCNC: 15 MG/DL (ref 7–30)
BUN SERPL-MCNC: 16 MG/DL (ref 7–30)
BUN SERPL-MCNC: 18 MG/DL (ref 7–30)
BUN SERPL-MCNC: 19 MG/DL (ref 7–30)
BUN SERPL-MCNC: 19 MG/DL (ref 7–30)
BUN SERPL-MCNC: 29 MG/DL (ref 7–30)
CALCIUM SERPL-MCNC: 7.8 MG/DL (ref 8.5–10.1)
CALCIUM SERPL-MCNC: 8 MG/DL (ref 8.5–10.1)
CALCIUM SERPL-MCNC: 8 MG/DL (ref 8.5–10.1)
CALCIUM SERPL-MCNC: 8.2 MG/DL (ref 8.5–10.1)
CALCIUM SERPL-MCNC: 8.2 MG/DL (ref 8.5–10.1)
CALCIUM SERPL-MCNC: 8.4 MG/DL (ref 8.5–10.1)
CALCIUM SERPL-MCNC: 9 MG/DL (ref 8.5–10.1)
CALCIUM SERPL-MCNC: 9.2 MG/DL (ref 8.5–10.1)
CALCIUM SERPL-MCNC: 9.3 MG/DL (ref 8.5–10.1)
CHLORIDE SERPL-SCNC: 104 MMOL/L (ref 94–109)
CHLORIDE SERPL-SCNC: 104 MMOL/L (ref 94–109)
CHLORIDE SERPL-SCNC: 105 MMOL/L (ref 94–109)
CHLORIDE SERPL-SCNC: 107 MMOL/L (ref 94–109)
CHLORIDE SERPL-SCNC: 108 MMOL/L (ref 94–109)
CHLORIDE SERPL-SCNC: 109 MMOL/L (ref 94–109)
CHLORIDE SERPL-SCNC: 110 MMOL/L (ref 94–109)
CHLORIDE SERPL-SCNC: 110 MMOL/L (ref 94–109)
CHLORIDE SERPL-SCNC: 112 MMOL/L (ref 94–109)
CHLORIDE SERPL-SCNC: 113 MMOL/L (ref 94–109)
CHLORIDE SERPL-SCNC: 114 MMOL/L (ref 94–109)
CO2 SERPL-SCNC: 24 MMOL/L (ref 20–32)
CO2 SERPL-SCNC: 26 MMOL/L (ref 20–32)
CO2 SERPL-SCNC: 27 MMOL/L (ref 20–32)
CO2 SERPL-SCNC: 27 MMOL/L (ref 20–32)
CO2 SERPL-SCNC: 28 MMOL/L (ref 20–32)
CO2 SERPL-SCNC: 28 MMOL/L (ref 20–32)
CO2 SERPL-SCNC: 29 MMOL/L (ref 20–32)
CO2 SERPL-SCNC: 30 MMOL/L (ref 20–32)
COLOR UR AUTO: YELLOW
COLOR UR AUTO: YELLOW
CREAT SERPL-MCNC: 0.97 MG/DL (ref 0.66–1.25)
CREAT SERPL-MCNC: 0.97 MG/DL (ref 0.66–1.25)
CREAT SERPL-MCNC: 1 MG/DL (ref 0.66–1.25)
CREAT SERPL-MCNC: 1.04 MG/DL (ref 0.66–1.25)
CREAT SERPL-MCNC: 1.08 MG/DL (ref 0.66–1.25)
CREAT SERPL-MCNC: 1.09 MG/DL (ref 0.66–1.25)
CREAT SERPL-MCNC: 1.12 MG/DL (ref 0.66–1.25)
CREAT SERPL-MCNC: 1.16 MG/DL (ref 0.66–1.25)
CREAT SERPL-MCNC: 1.24 MG/DL (ref 0.66–1.25)
CREAT SERPL-MCNC: 1.33 MG/DL (ref 0.66–1.25)
CREAT SERPL-MCNC: 1.41 MG/DL (ref 0.66–1.25)
CRP SERPL-MCNC: 246 MG/L (ref 0–8)
CRP SERPL-MCNC: 78.3 MG/L (ref 0–8)
DIFFERENTIAL METHOD BLD: ABNORMAL
DIFFERENTIAL METHOD BLD: NORMAL
EOSINOPHIL # BLD AUTO: 0.1 10E9/L (ref 0–0.7)
EOSINOPHIL # BLD AUTO: 0.3 10E9/L (ref 0–0.7)
EOSINOPHIL # BLD AUTO: 0.4 10E9/L (ref 0–0.7)
EOSINOPHIL # BLD AUTO: 0.6 10E9/L (ref 0–0.7)
EOSINOPHIL # BLD AUTO: 1.1 10E9/L (ref 0–0.7)
EOSINOPHIL NFR BLD AUTO: 0.5 %
EOSINOPHIL NFR BLD AUTO: 11.9 %
EOSINOPHIL NFR BLD AUTO: 2.7 %
EOSINOPHIL NFR BLD AUTO: 3 %
EOSINOPHIL NFR BLD AUTO: 3.8 %
EOSINOPHIL NFR BLD AUTO: 4.5 %
EOSINOPHIL NFR BLD AUTO: 5.3 %
EOSINOPHIL NFR BLD AUTO: 5.5 %
ERYTHROCYTE [DISTWIDTH] IN BLOOD BY AUTOMATED COUNT: 13 % (ref 10–15)
ERYTHROCYTE [DISTWIDTH] IN BLOOD BY AUTOMATED COUNT: 13 % (ref 10–15)
ERYTHROCYTE [DISTWIDTH] IN BLOOD BY AUTOMATED COUNT: 13.1 % (ref 10–15)
ERYTHROCYTE [DISTWIDTH] IN BLOOD BY AUTOMATED COUNT: 13.1 % (ref 10–15)
ERYTHROCYTE [DISTWIDTH] IN BLOOD BY AUTOMATED COUNT: 13.3 % (ref 10–15)
ERYTHROCYTE [DISTWIDTH] IN BLOOD BY AUTOMATED COUNT: 13.7 % (ref 10–15)
ERYTHROCYTE [DISTWIDTH] IN BLOOD BY AUTOMATED COUNT: 16.4 % (ref 10–15)
ERYTHROCYTE [DISTWIDTH] IN BLOOD BY AUTOMATED COUNT: 16.4 % (ref 10–15)
ERYTHROCYTE [DISTWIDTH] IN BLOOD BY AUTOMATED COUNT: 16.5 % (ref 10–15)
ERYTHROCYTE [DISTWIDTH] IN BLOOD BY AUTOMATED COUNT: 16.7 % (ref 10–15)
ERYTHROCYTE [DISTWIDTH] IN BLOOD BY AUTOMATED COUNT: 17.5 % (ref 10–15)
GFR SERPL CREATININE-BSD FRML MDRD: 49 ML/MIN/1.7M2
GFR SERPL CREATININE-BSD FRML MDRD: 53 ML/MIN/1.7M2
GFR SERPL CREATININE-BSD FRML MDRD: 57 ML/MIN/1.7M2
GFR SERPL CREATININE-BSD FRML MDRD: 62 ML/MIN/1.7M2
GFR SERPL CREATININE-BSD FRML MDRD: 64 ML/MIN/1.7M2
GFR SERPL CREATININE-BSD FRML MDRD: 66 ML/MIN/1.7M2
GFR SERPL CREATININE-BSD FRML MDRD: 67 ML/MIN/1.7M2
GFR SERPL CREATININE-BSD FRML MDRD: 70 ML/MIN/1.7M2
GFR SERPL CREATININE-BSD FRML MDRD: 73 ML/MIN/1.7M2
GFR SERPL CREATININE-BSD FRML MDRD: 75 ML/MIN/1.7M2
GFR SERPL CREATININE-BSD FRML MDRD: 76 ML/MIN/1.7M2
GLUCOSE SERPL-MCNC: 104 MG/DL (ref 70–99)
GLUCOSE SERPL-MCNC: 109 MG/DL (ref 70–99)
GLUCOSE SERPL-MCNC: 111 MG/DL (ref 70–99)
GLUCOSE SERPL-MCNC: 126 MG/DL (ref 70–99)
GLUCOSE SERPL-MCNC: 87 MG/DL (ref 70–99)
GLUCOSE SERPL-MCNC: 87 MG/DL (ref 70–99)
GLUCOSE SERPL-MCNC: 92 MG/DL (ref 70–99)
GLUCOSE SERPL-MCNC: 92 MG/DL (ref 70–99)
GLUCOSE SERPL-MCNC: 94 MG/DL (ref 70–99)
GLUCOSE SERPL-MCNC: 95 MG/DL (ref 70–99)
GLUCOSE SERPL-MCNC: 97 MG/DL (ref 70–99)
GLUCOSE UR STRIP-MCNC: 30 MG/DL
GLUCOSE UR STRIP-MCNC: NEGATIVE MG/DL
GRAM STN SPEC: ABNORMAL
HCT VFR BLD AUTO: 31.3 % (ref 40–53)
HCT VFR BLD AUTO: 32.9 % (ref 40–53)
HCT VFR BLD AUTO: 34.2 % (ref 40–53)
HCT VFR BLD AUTO: 36.5 % (ref 40–53)
HCT VFR BLD AUTO: 40.2 % (ref 40–53)
HCT VFR BLD AUTO: 40.3 % (ref 40–53)
HCT VFR BLD AUTO: 41.1 % (ref 40–53)
HCT VFR BLD AUTO: 42 % (ref 40–53)
HCT VFR BLD AUTO: 42.7 % (ref 40–53)
HCT VFR BLD AUTO: 45.3 % (ref 40–53)
HCT VFR BLD AUTO: 48.8 % (ref 40–53)
HGB BLD-MCNC: 10.2 G/DL (ref 13.3–17.7)
HGB BLD-MCNC: 10.9 G/DL (ref 13.3–17.7)
HGB BLD-MCNC: 11.1 G/DL (ref 13.3–17.7)
HGB BLD-MCNC: 12.1 G/DL (ref 13.3–17.7)
HGB BLD-MCNC: 13.2 G/DL (ref 13.3–17.7)
HGB BLD-MCNC: 13.3 G/DL (ref 13.3–17.7)
HGB BLD-MCNC: 13.3 G/DL (ref 13.3–17.7)
HGB BLD-MCNC: 13.6 G/DL (ref 13.3–17.7)
HGB BLD-MCNC: 13.9 G/DL (ref 13.3–17.7)
HGB BLD-MCNC: 14.8 G/DL (ref 13.3–17.7)
HGB BLD-MCNC: 15.9 G/DL (ref 13.3–17.7)
HGB UR QL STRIP: ABNORMAL
HGB UR QL STRIP: ABNORMAL
HYALINE CASTS #/AREA URNS LPF: 1 /LPF
HYALINE CASTS #/AREA URNS LPF: 1 /LPF
IMM GRANULOCYTES # BLD: 0 10E9/L (ref 0–0.4)
IMM GRANULOCYTES # BLD: 0.1 10E9/L (ref 0–0.4)
IMM GRANULOCYTES NFR BLD: 0.3 %
IMM GRANULOCYTES NFR BLD: 0.3 %
IMM GRANULOCYTES NFR BLD: 0.4 %
IMM GRANULOCYTES NFR BLD: 0.5 %
IMM GRANULOCYTES NFR BLD: 0.6 %
IMM GRANULOCYTES NFR BLD: 0.6 %
IMM GRANULOCYTES NFR BLD: 0.7 %
IMM GRANULOCYTES NFR BLD: 1 %
INR PPP: 1.09 (ref 0.8–1.2)
INR PPP: 1.13 (ref 0.8–1.2)
INR PPP: 1.15 (ref 0.8–1.2)
INR PPP: 1.77 (ref 0.8–1.2)
INR PPP: 1.81 (ref 0.8–1.2)
INR PPP: 1.99 (ref 0.8–1.2)
INR PPP: 2.11 (ref 0.8–1.2)
INR PPP: 2.22 (ref 0.8–1.2)
INR PPP: 2.28 (ref 0.8–1.2)
INR PPP: 2.31 (ref 0.8–1.2)
INR PPP: 2.4 (ref 0.8–1.2)
INR PPP: 2.49 (ref 0.8–1.2)
INR PPP: 2.61 (ref 0.8–1.2)
KETONES UR STRIP-MCNC: 10 MG/DL
KETONES UR STRIP-MCNC: 40 MG/DL
LACTATE SERPL-SCNC: 1.4 MMOL/L (ref 0.4–2)
LACTATE SERPL-SCNC: 1.8 MMOL/L (ref 0.4–2)
LACTATE SERPL-SCNC: 2.2 MMOL/L (ref 0.4–2)
LACTATE SERPL-SCNC: 2.5 MMOL/L (ref 0.4–2)
LEUKOCYTE ESTERASE UR QL STRIP: NEGATIVE
LEUKOCYTE ESTERASE UR QL STRIP: NEGATIVE
LYMPHOCYTES # BLD AUTO: 1.1 10E9/L (ref 0.8–5.3)
LYMPHOCYTES # BLD AUTO: 1.4 10E9/L (ref 0.8–5.3)
LYMPHOCYTES # BLD AUTO: 1.4 10E9/L (ref 0.8–5.3)
LYMPHOCYTES # BLD AUTO: 1.5 10E9/L (ref 0.8–5.3)
LYMPHOCYTES # BLD AUTO: 1.5 10E9/L (ref 0.8–5.3)
LYMPHOCYTES # BLD AUTO: 1.6 10E9/L (ref 0.8–5.3)
LYMPHOCYTES # BLD AUTO: 1.6 10E9/L (ref 0.8–5.3)
LYMPHOCYTES # BLD AUTO: 1.9 10E9/L (ref 0.8–5.3)
LYMPHOCYTES NFR BLD AUTO: 11.4 %
LYMPHOCYTES NFR BLD AUTO: 13 %
LYMPHOCYTES NFR BLD AUTO: 13.3 %
LYMPHOCYTES NFR BLD AUTO: 15.1 %
LYMPHOCYTES NFR BLD AUTO: 15.2 %
LYMPHOCYTES NFR BLD AUTO: 15.9 %
LYMPHOCYTES NFR BLD AUTO: 16.8 %
LYMPHOCYTES NFR BLD AUTO: 18.2 %
MCH RBC QN AUTO: 26.6 PG (ref 26.5–33)
MCH RBC QN AUTO: 26.6 PG (ref 26.5–33)
MCH RBC QN AUTO: 26.9 PG (ref 26.5–33)
MCH RBC QN AUTO: 26.9 PG (ref 26.5–33)
MCH RBC QN AUTO: 27.4 PG (ref 26.5–33)
MCH RBC QN AUTO: 27.5 PG (ref 26.5–33)
MCH RBC QN AUTO: 27.6 PG (ref 26.5–33)
MCH RBC QN AUTO: 27.6 PG (ref 26.5–33)
MCH RBC QN AUTO: 27.8 PG (ref 26.5–33)
MCH RBC QN AUTO: 27.9 PG (ref 26.5–33)
MCH RBC QN AUTO: 28.3 PG (ref 26.5–33)
MCHC RBC AUTO-ENTMCNC: 31.9 G/DL (ref 31.5–36.5)
MCHC RBC AUTO-ENTMCNC: 32.4 G/DL (ref 31.5–36.5)
MCHC RBC AUTO-ENTMCNC: 32.5 G/DL (ref 31.5–36.5)
MCHC RBC AUTO-ENTMCNC: 32.6 G/DL (ref 31.5–36.5)
MCHC RBC AUTO-ENTMCNC: 32.6 G/DL (ref 31.5–36.5)
MCHC RBC AUTO-ENTMCNC: 32.7 G/DL (ref 31.5–36.5)
MCHC RBC AUTO-ENTMCNC: 32.8 G/DL (ref 31.5–36.5)
MCHC RBC AUTO-ENTMCNC: 33.1 G/DL (ref 31.5–36.5)
MCHC RBC AUTO-ENTMCNC: 33.2 G/DL (ref 31.5–36.5)
MCV RBC AUTO: 81 FL (ref 78–100)
MCV RBC AUTO: 81 FL (ref 78–100)
MCV RBC AUTO: 82 FL (ref 78–100)
MCV RBC AUTO: 83 FL (ref 78–100)
MCV RBC AUTO: 83 FL (ref 78–100)
MCV RBC AUTO: 84 FL (ref 78–100)
MCV RBC AUTO: 85 FL (ref 78–100)
MCV RBC AUTO: 86 FL (ref 78–100)
MONOCYTES # BLD AUTO: 0.8 10E9/L (ref 0–1.3)
MONOCYTES # BLD AUTO: 0.9 10E9/L (ref 0–1.3)
MONOCYTES # BLD AUTO: 1 10E9/L (ref 0–1.3)
MONOCYTES # BLD AUTO: 1 10E9/L (ref 0–1.3)
MONOCYTES # BLD AUTO: 1.1 10E9/L (ref 0–1.3)
MONOCYTES # BLD AUTO: 1.2 10E9/L (ref 0–1.3)
MONOCYTES NFR BLD AUTO: 10.2 %
MONOCYTES NFR BLD AUTO: 10.2 %
MONOCYTES NFR BLD AUTO: 10.9 %
MONOCYTES NFR BLD AUTO: 11.2 %
MONOCYTES NFR BLD AUTO: 7.8 %
MONOCYTES NFR BLD AUTO: 8.2 %
MONOCYTES NFR BLD AUTO: 9.1 %
MONOCYTES NFR BLD AUTO: 9.9 %
MUCOUS THREADS #/AREA URNS LPF: PRESENT /LPF
NEUTROPHILS # BLD AUTO: 11.3 10E9/L (ref 1.6–8.3)
NEUTROPHILS # BLD AUTO: 5.3 10E9/L (ref 1.6–8.3)
NEUTROPHILS # BLD AUTO: 5.8 10E9/L (ref 1.6–8.3)
NEUTROPHILS # BLD AUTO: 6 10E9/L (ref 1.6–8.3)
NEUTROPHILS # BLD AUTO: 6.8 10E9/L (ref 1.6–8.3)
NEUTROPHILS # BLD AUTO: 6.9 10E9/L (ref 1.6–8.3)
NEUTROPHILS # BLD AUTO: 7 10E9/L (ref 1.6–8.3)
NEUTROPHILS # BLD AUTO: 8.6 10E9/L (ref 1.6–8.3)
NEUTROPHILS NFR BLD AUTO: 60.5 %
NEUTROPHILS NFR BLD AUTO: 65.4 %
NEUTROPHILS NFR BLD AUTO: 67.1 %
NEUTROPHILS NFR BLD AUTO: 67.6 %
NEUTROPHILS NFR BLD AUTO: 72.1 %
NEUTROPHILS NFR BLD AUTO: 73.4 %
NEUTROPHILS NFR BLD AUTO: 75.5 %
NEUTROPHILS NFR BLD AUTO: 75.8 %
NITRATE UR QL: NEGATIVE
NITRATE UR QL: NEGATIVE
NRBC # BLD AUTO: 0 10*3/UL
NRBC BLD AUTO-RTO: 0 /100
PH UR STRIP: 6.5 PH (ref 4.7–8)
PH UR STRIP: 7 PH (ref 4.7–8)
PLATELET # BLD AUTO: 181 10E9/L (ref 150–450)
PLATELET # BLD AUTO: 190 10E9/L (ref 150–450)
PLATELET # BLD AUTO: 199 10E9/L (ref 150–450)
PLATELET # BLD AUTO: 201 10E9/L (ref 150–450)
PLATELET # BLD AUTO: 221 10E9/L (ref 150–450)
PLATELET # BLD AUTO: 241 10E9/L (ref 150–450)
PLATELET # BLD AUTO: 249 10E9/L (ref 150–450)
PLATELET # BLD AUTO: 256 10E9/L (ref 150–450)
PLATELET # BLD AUTO: 259 10E9/L (ref 150–450)
PLATELET # BLD AUTO: 271 10E9/L (ref 150–450)
PLATELET # BLD AUTO: 310 10E9/L (ref 150–450)
POTASSIUM SERPL-SCNC: 2.7 MMOL/L (ref 3.4–5.3)
POTASSIUM SERPL-SCNC: 2.8 MMOL/L (ref 3.4–5.3)
POTASSIUM SERPL-SCNC: 3.1 MMOL/L (ref 3.4–5.3)
POTASSIUM SERPL-SCNC: 3.1 MMOL/L (ref 3.4–5.3)
POTASSIUM SERPL-SCNC: 3.2 MMOL/L (ref 3.4–5.3)
POTASSIUM SERPL-SCNC: 3.2 MMOL/L (ref 3.4–5.3)
POTASSIUM SERPL-SCNC: 3.6 MMOL/L (ref 3.4–5.3)
POTASSIUM SERPL-SCNC: 3.7 MMOL/L (ref 3.4–5.3)
POTASSIUM SERPL-SCNC: 3.9 MMOL/L (ref 3.4–5.3)
POTASSIUM SERPL-SCNC: 3.9 MMOL/L (ref 3.4–5.3)
POTASSIUM SERPL-SCNC: 4.2 MMOL/L (ref 3.4–5.3)
PROT SERPL-MCNC: 7 G/DL (ref 6.8–8.8)
PROT SERPL-MCNC: 7.1 G/DL (ref 6.8–8.8)
PROT SERPL-MCNC: 7.3 G/DL (ref 6.8–8.8)
RBC # BLD AUTO: 3.69 10E12/L (ref 4.4–5.9)
RBC # BLD AUTO: 3.9 10E12/L (ref 4.4–5.9)
RBC # BLD AUTO: 4.02 10E12/L (ref 4.4–5.9)
RBC # BLD AUTO: 4.28 10E12/L (ref 4.4–5.9)
RBC # BLD AUTO: 4.84 10E12/L (ref 4.4–5.9)
RBC # BLD AUTO: 4.96 10E12/L (ref 4.4–5.9)
RBC # BLD AUTO: 4.97 10E12/L (ref 4.4–5.9)
RBC # BLD AUTO: 5 10E12/L (ref 4.4–5.9)
RBC # BLD AUTO: 5.17 10E12/L (ref 4.4–5.9)
RBC # BLD AUTO: 5.32 10E12/L (ref 4.4–5.9)
RBC # BLD AUTO: 5.9 10E12/L (ref 4.4–5.9)
RBC #/AREA URNS AUTO: 6 /HPF (ref 0–2)
RBC #/AREA URNS AUTO: 8 /HPF (ref 0–2)
SODIUM SERPL-SCNC: 141 MMOL/L (ref 133–144)
SODIUM SERPL-SCNC: 141 MMOL/L (ref 133–144)
SODIUM SERPL-SCNC: 142 MMOL/L (ref 133–144)
SODIUM SERPL-SCNC: 144 MMOL/L (ref 133–144)
SODIUM SERPL-SCNC: 145 MMOL/L (ref 133–144)
SODIUM SERPL-SCNC: 148 MMOL/L (ref 133–144)
SOURCE: ABNORMAL
SOURCE: ABNORMAL
SP GR UR STRIP: 1.01 (ref 1–1.03)
SP GR UR STRIP: 1.01 (ref 1–1.03)
SPECIMEN SOURCE: ABNORMAL
SPECIMEN SOURCE: NORMAL
UROBILINOGEN UR STRIP-MCNC: NORMAL MG/DL (ref 0–2)
UROBILINOGEN UR STRIP-MCNC: NORMAL MG/DL (ref 0–2)
VANCOMYCIN SERPL-MCNC: 25.4 MG/L
WBC # BLD AUTO: 10.5 10E9/L (ref 4–11)
WBC # BLD AUTO: 11.4 10E9/L (ref 4–11)
WBC # BLD AUTO: 14.9 10E9/L (ref 4–11)
WBC # BLD AUTO: 8.1 10E9/L (ref 4–11)
WBC # BLD AUTO: 8.2 10E9/L (ref 4–11)
WBC # BLD AUTO: 8.9 10E9/L (ref 4–11)
WBC # BLD AUTO: 9.1 10E9/L (ref 4–11)
WBC # BLD AUTO: 9.4 10E9/L (ref 4–11)
WBC # BLD AUTO: 9.4 10E9/L (ref 4–11)
WBC # BLD AUTO: 9.6 10E9/L (ref 4–11)
WBC # BLD AUTO: 9.8 10E9/L (ref 4–11)
WBC #/AREA URNS AUTO: 1 /HPF (ref 0–2)
WBC #/AREA URNS AUTO: 2 /HPF (ref 0–5)

## 2018-01-01 PROCEDURE — 25000132 ZZH RX MED GY IP 250 OP 250 PS 637: Mod: GY | Performed by: INTERNAL MEDICINE

## 2018-01-01 PROCEDURE — 25000128 H RX IP 250 OP 636: Performed by: INTERNAL MEDICINE

## 2018-01-01 PROCEDURE — 25000128 H RX IP 250 OP 636

## 2018-01-01 PROCEDURE — 36000052 ZZH SURGERY LEVEL 2 EA 15 ADDTL MIN: Performed by: SURGERY

## 2018-01-01 PROCEDURE — 85027 COMPLETE CBC AUTOMATED: CPT | Performed by: SURGERY

## 2018-01-01 PROCEDURE — 11043 DBRDMT MUSC&/FSCA 1ST 20/<: CPT | Performed by: SURGERY

## 2018-01-01 PROCEDURE — 80048 BASIC METABOLIC PNL TOTAL CA: CPT | Performed by: SURGERY

## 2018-01-01 PROCEDURE — 99100 ANES PT EXTEME AGE<1 YR&>70: CPT | Performed by: NURSE ANESTHETIST, CERTIFIED REGISTERED

## 2018-01-01 PROCEDURE — 80048 BASIC METABOLIC PNL TOTAL CA: CPT | Performed by: INTERNAL MEDICINE

## 2018-01-01 PROCEDURE — 86140 C-REACTIVE PROTEIN: CPT | Performed by: FAMILY MEDICINE

## 2018-01-01 PROCEDURE — 99285 EMERGENCY DEPT VISIT HI MDM: CPT | Performed by: FAMILY MEDICINE

## 2018-01-01 PROCEDURE — 40000275 ZZH STATISTIC RCP TIME EA 10 MIN

## 2018-01-01 PROCEDURE — 36000050 ZZH SURGERY LEVEL 2 1ST 30 MIN: Performed by: SURGERY

## 2018-01-01 PROCEDURE — 85025 COMPLETE CBC W/AUTO DIFF WBC: CPT | Performed by: PHYSICIAN ASSISTANT

## 2018-01-01 PROCEDURE — 81001 URINALYSIS AUTO W/SCOPE: CPT | Performed by: FAMILY MEDICINE

## 2018-01-01 PROCEDURE — 99232 SBSQ HOSP IP/OBS MODERATE 35: CPT | Performed by: NURSE PRACTITIONER

## 2018-01-01 PROCEDURE — 25000132 ZZH RX MED GY IP 250 OP 250 PS 637: Mod: GY | Performed by: NURSE PRACTITIONER

## 2018-01-01 PROCEDURE — 85027 COMPLETE CBC AUTOMATED: CPT | Performed by: INTERNAL MEDICINE

## 2018-01-01 PROCEDURE — 85610 PROTHROMBIN TIME: CPT | Performed by: SURGERY

## 2018-01-01 PROCEDURE — 85025 COMPLETE CBC W/AUTO DIFF WBC: CPT | Performed by: INTERNAL MEDICINE

## 2018-01-01 PROCEDURE — A9270 NON-COVERED ITEM OR SERVICE: HCPCS | Mod: GY | Performed by: INTERNAL MEDICINE

## 2018-01-01 PROCEDURE — 71046 X-RAY EXAM CHEST 2 VIEWS: CPT | Mod: TC

## 2018-01-01 PROCEDURE — 87070 CULTURE OTHR SPECIMN AEROBIC: CPT | Performed by: SURGERY

## 2018-01-01 PROCEDURE — 25000125 ZZHC RX 250: Performed by: INTERNAL MEDICINE

## 2018-01-01 PROCEDURE — 99223 1ST HOSP IP/OBS HIGH 75: CPT | Mod: AI | Performed by: INTERNAL MEDICINE

## 2018-01-01 PROCEDURE — 85610 PROTHROMBIN TIME: CPT | Performed by: INTERNAL MEDICINE

## 2018-01-01 PROCEDURE — 99238 HOSP IP/OBS DSCHRG MGMT 30/<: CPT | Performed by: INTERNAL MEDICINE

## 2018-01-01 PROCEDURE — 87205 SMEAR GRAM STAIN: CPT | Performed by: FAMILY MEDICINE

## 2018-01-01 PROCEDURE — 99232 SBSQ HOSP IP/OBS MODERATE 35: CPT | Performed by: INTERNAL MEDICINE

## 2018-01-01 PROCEDURE — 81001 URINALYSIS AUTO W/SCOPE: CPT | Performed by: PHYSICIAN ASSISTANT

## 2018-01-01 PROCEDURE — 36415 COLL VENOUS BLD VENIPUNCTURE: CPT | Performed by: NURSE PRACTITIONER

## 2018-01-01 PROCEDURE — 36415 COLL VENOUS BLD VENIPUNCTURE: CPT | Performed by: SURGERY

## 2018-01-01 PROCEDURE — 94640 AIRWAY INHALATION TREATMENT: CPT | Mod: 76

## 2018-01-01 PROCEDURE — 85610 PROTHROMBIN TIME: CPT | Performed by: PHYSICIAN ASSISTANT

## 2018-01-01 PROCEDURE — A9270 NON-COVERED ITEM OR SERVICE: HCPCS | Performed by: INTERNAL MEDICINE

## 2018-01-01 PROCEDURE — 87181 SC STD AGAR DILUTION PER AGT: CPT | Performed by: SURGERY

## 2018-01-01 PROCEDURE — 87040 BLOOD CULTURE FOR BACTERIA: CPT | Performed by: NURSE PRACTITIONER

## 2018-01-01 PROCEDURE — 37000011 ZZH ANESTHESIA WARD SERVICE: Performed by: NURSE ANESTHETIST, CERTIFIED REGISTERED

## 2018-01-01 PROCEDURE — 12000000 ZZH R&B MED SURG/OB

## 2018-01-01 PROCEDURE — 87086 URINE CULTURE/COLONY COUNT: CPT | Performed by: FAMILY MEDICINE

## 2018-01-01 PROCEDURE — 87077 CULTURE AEROBIC IDENTIFY: CPT | Performed by: SURGERY

## 2018-01-01 PROCEDURE — 87186 SC STD MICRODIL/AGAR DIL: CPT | Performed by: FAMILY MEDICINE

## 2018-01-01 PROCEDURE — A9270 NON-COVERED ITEM OR SERVICE: HCPCS | Mod: GY | Performed by: NURSE PRACTITIONER

## 2018-01-01 PROCEDURE — 36415 COLL VENOUS BLD VENIPUNCTURE: CPT | Performed by: PHYSICIAN ASSISTANT

## 2018-01-01 PROCEDURE — 87076 CULTURE ANAEROBE IDENT EACH: CPT | Performed by: SURGERY

## 2018-01-01 PROCEDURE — 80202 ASSAY OF VANCOMYCIN: CPT | Performed by: NURSE PRACTITIONER

## 2018-01-01 PROCEDURE — 87040 BLOOD CULTURE FOR BACTERIA: CPT | Performed by: FAMILY MEDICINE

## 2018-01-01 PROCEDURE — 87070 CULTURE OTHR SPECIMN AEROBIC: CPT | Performed by: FAMILY MEDICINE

## 2018-01-01 PROCEDURE — 27110028 ZZH OR GENERAL SUPPLY NON-STERILE: Performed by: SURGERY

## 2018-01-01 PROCEDURE — 94640 AIRWAY INHALATION TREATMENT: CPT

## 2018-01-01 PROCEDURE — 87186 SC STD MICRODIL/AGAR DIL: CPT | Performed by: SURGERY

## 2018-01-01 PROCEDURE — 96367 TX/PROPH/DG ADDL SEQ IV INF: CPT

## 2018-01-01 PROCEDURE — 80053 COMPREHEN METABOLIC PANEL: CPT | Performed by: PHYSICIAN ASSISTANT

## 2018-01-01 PROCEDURE — 10061 I&D ABSCESS COMP/MULTIPLE: CPT | Performed by: NURSE ANESTHETIST, CERTIFIED REGISTERED

## 2018-01-01 PROCEDURE — 99239 HOSP IP/OBS DSCHRG MGMT >30: CPT | Performed by: INTERNAL MEDICINE

## 2018-01-01 PROCEDURE — 2894A VOIDCORRECT: CPT | Performed by: SURGERY

## 2018-01-01 PROCEDURE — 25000566 ZZH SEVOFLURANE, EA 15 MIN: Performed by: NURSE ANESTHETIST, CERTIFIED REGISTERED

## 2018-01-01 PROCEDURE — 86140 C-REACTIVE PROTEIN: CPT | Performed by: SURGERY

## 2018-01-01 PROCEDURE — 85025 COMPLETE CBC W/AUTO DIFF WBC: CPT | Performed by: FAMILY MEDICINE

## 2018-01-01 PROCEDURE — 25000128 H RX IP 250 OP 636: Performed by: FAMILY MEDICINE

## 2018-01-01 PROCEDURE — 70450 CT HEAD/BRAIN W/O DYE: CPT | Mod: TC

## 2018-01-01 PROCEDURE — 36415 COLL VENOUS BLD VENIPUNCTURE: CPT | Performed by: INTERNAL MEDICINE

## 2018-01-01 PROCEDURE — 87077 CULTURE AEROBIC IDENTIFY: CPT | Performed by: FAMILY MEDICINE

## 2018-01-01 PROCEDURE — 36415 COLL VENOUS BLD VENIPUNCTURE: CPT | Performed by: FAMILY MEDICINE

## 2018-01-01 PROCEDURE — 99285 EMERGENCY DEPT VISIT HI MDM: CPT | Mod: 25

## 2018-01-01 PROCEDURE — 99222 1ST HOSP IP/OBS MODERATE 55: CPT | Mod: GV | Performed by: FAMILY MEDICINE

## 2018-01-01 PROCEDURE — 83605 ASSAY OF LACTIC ACID: CPT | Performed by: PHYSICIAN ASSISTANT

## 2018-01-01 PROCEDURE — 85610 PROTHROMBIN TIME: CPT | Performed by: FAMILY MEDICINE

## 2018-01-01 PROCEDURE — 87086 URINE CULTURE/COLONY COUNT: CPT | Performed by: PHYSICIAN ASSISTANT

## 2018-01-01 PROCEDURE — 99223 1ST HOSP IP/OBS HIGH 75: CPT | Mod: AI | Performed by: SURGERY

## 2018-01-01 PROCEDURE — 80053 COMPREHEN METABOLIC PANEL: CPT | Performed by: FAMILY MEDICINE

## 2018-01-01 PROCEDURE — 25000125 ZZHC RX 250: Performed by: NURSE ANESTHETIST, CERTIFIED REGISTERED

## 2018-01-01 PROCEDURE — 25000128 H RX IP 250 OP 636: Performed by: NURSE ANESTHETIST, CERTIFIED REGISTERED

## 2018-01-01 PROCEDURE — 99140 ANES COMP EMERGENCY COND: CPT | Performed by: NURSE ANESTHETIST, CERTIFIED REGISTERED

## 2018-01-01 PROCEDURE — 25000125 ZZHC RX 250: Performed by: FAMILY MEDICINE

## 2018-01-01 PROCEDURE — 96361 HYDRATE IV INFUSION ADD-ON: CPT

## 2018-01-01 PROCEDURE — 83605 ASSAY OF LACTIC ACID: CPT | Performed by: FAMILY MEDICINE

## 2018-01-01 PROCEDURE — 96375 TX/PRO/DX INJ NEW DRUG ADDON: CPT

## 2018-01-01 PROCEDURE — 96365 THER/PROPH/DIAG IV INF INIT: CPT

## 2018-01-01 PROCEDURE — 0KBP0ZZ EXCISION OF LEFT HIP MUSCLE, OPEN APPROACH: ICD-10-PCS | Performed by: SURGERY

## 2018-01-01 PROCEDURE — 96374 THER/PROPH/DIAG INJ IV PUSH: CPT

## 2018-01-01 PROCEDURE — 27210794 ZZH OR GENERAL SUPPLY STERILE: Performed by: SURGERY

## 2018-01-01 PROCEDURE — 40000893 ZZH STATISTIC PT IP EVAL DEFER: Performed by: PHYSICAL THERAPIST

## 2018-01-01 PROCEDURE — 25000128 H RX IP 250 OP 636: Performed by: NURSE PRACTITIONER

## 2018-01-01 PROCEDURE — 37000008 ZZH ANESTHESIA TECHNICAL FEE, 1ST 30 MIN: Performed by: SURGERY

## 2018-01-01 PROCEDURE — 87205 SMEAR GRAM STAIN: CPT | Performed by: SURGERY

## 2018-01-01 PROCEDURE — 99284 EMERGENCY DEPT VISIT MOD MDM: CPT | Performed by: PHYSICIAN ASSISTANT

## 2018-01-01 PROCEDURE — 25000128 H RX IP 250 OP 636: Performed by: PHYSICIAN ASSISTANT

## 2018-01-01 PROCEDURE — 87075 CULTR BACTERIA EXCEPT BLOOD: CPT | Performed by: SURGERY

## 2018-01-01 PROCEDURE — 99285 EMERGENCY DEPT VISIT HI MDM: CPT | Mod: Z6 | Performed by: FAMILY MEDICINE

## 2018-01-01 PROCEDURE — 37000009 ZZH ANESTHESIA TECHNICAL FEE, EACH ADDTL 15 MIN: Performed by: SURGERY

## 2018-01-01 PROCEDURE — 71000014 ZZH RECOVERY PHASE 1 LEVEL 2 FIRST HR: Performed by: SURGERY

## 2018-01-01 PROCEDURE — 85025 COMPLETE CBC W/AUTO DIFF WBC: CPT | Performed by: SURGERY

## 2018-01-01 PROCEDURE — 87040 BLOOD CULTURE FOR BACTERIA: CPT | Mod: 59 | Performed by: PHYSICIAN ASSISTANT

## 2018-01-01 PROCEDURE — 87185 SC STD ENZYME DETCJ PER NZM: CPT | Performed by: SURGERY

## 2018-01-01 RX ORDER — POTASSIUM CHLORIDE 1500 MG/1
40 TABLET, EXTENDED RELEASE ORAL 2 TIMES DAILY
Status: COMPLETED | OUTPATIENT
Start: 2018-01-01 | End: 2018-01-01

## 2018-01-01 RX ORDER — NICOTINE 21 MG/24HR
1 PATCH, TRANSDERMAL 24 HOURS TRANSDERMAL DAILY
COMMUNITY

## 2018-01-01 RX ORDER — B-COMPLEX WITH VITAMIN C
1 TABLET ORAL DAILY
Status: ON HOLD | COMMUNITY
End: 2018-01-01

## 2018-01-01 RX ORDER — AMLODIPINE BESYLATE 5 MG/1
5 TABLET ORAL DAILY
Status: DISCONTINUED | OUTPATIENT
Start: 2018-01-01 | End: 2018-01-01 | Stop reason: HOSPADM

## 2018-01-01 RX ORDER — CLINDAMYCIN HCL 300 MG
300 CAPSULE ORAL EVERY 8 HOURS
Status: DISCONTINUED | OUTPATIENT
Start: 2018-01-01 | End: 2018-01-01 | Stop reason: HOSPADM

## 2018-01-01 RX ORDER — WARFARIN SODIUM 5 MG/1
5 TABLET ORAL
Status: COMPLETED | OUTPATIENT
Start: 2018-01-01 | End: 2018-01-01

## 2018-01-01 RX ORDER — AMLODIPINE BESYLATE 10 MG/1
10 TABLET ORAL DAILY
Qty: 30 TABLET | Refills: 1 | Status: SHIPPED | OUTPATIENT
Start: 2018-01-01

## 2018-01-01 RX ORDER — LIDOCAINE HYDROCHLORIDE 20 MG/ML
INJECTION, SOLUTION INFILTRATION; PERINEURAL PRN
Status: DISCONTINUED | OUTPATIENT
Start: 2018-01-01 | End: 2018-01-01

## 2018-01-01 RX ORDER — CLINDAMYCIN HCL 300 MG
300 CAPSULE ORAL EVERY 8 HOURS
Qty: 15 CAPSULE | Refills: 0 | Status: SHIPPED | OUTPATIENT
Start: 2018-01-01 | End: 2018-01-01

## 2018-01-01 RX ORDER — FENTANYL CITRATE 50 UG/ML
INJECTION, SOLUTION INTRAMUSCULAR; INTRAVENOUS
Status: COMPLETED
Start: 2018-01-01 | End: 2018-01-01

## 2018-01-01 RX ORDER — IPRATROPIUM BROMIDE AND ALBUTEROL SULFATE 2.5; .5 MG/3ML; MG/3ML
SOLUTION RESPIRATORY (INHALATION) EVERY 6 HOURS PRN
COMMUNITY

## 2018-01-01 RX ORDER — ONDANSETRON 2 MG/ML
INJECTION INTRAMUSCULAR; INTRAVENOUS PRN
Status: DISCONTINUED | OUTPATIENT
Start: 2018-01-01 | End: 2018-01-01

## 2018-01-01 RX ORDER — ACETAMINOPHEN 650 MG/1
650 SUPPOSITORY RECTAL EVERY 4 HOURS PRN
Status: DISCONTINUED | OUTPATIENT
Start: 2018-01-01 | End: 2018-01-01 | Stop reason: HOSPADM

## 2018-01-01 RX ORDER — WARFARIN SODIUM 2.5 MG/1
2.5 TABLET ORAL
Status: DISCONTINUED | OUTPATIENT
Start: 2018-01-01 | End: 2018-01-01 | Stop reason: CLARIF

## 2018-01-01 RX ORDER — PROCHLORPERAZINE MALEATE 5 MG
5 TABLET ORAL EVERY 6 HOURS PRN
Status: DISCONTINUED | OUTPATIENT
Start: 2018-01-01 | End: 2018-01-01 | Stop reason: HOSPADM

## 2018-01-01 RX ORDER — WARFARIN SODIUM 2.5 MG/1
TABLET ORAL
Qty: 30 TABLET | Refills: 1 | Status: ON HOLD | COMMUNITY
Start: 2018-01-01 | End: 2018-01-01

## 2018-01-01 RX ORDER — IPRATROPIUM BROMIDE AND ALBUTEROL SULFATE 2.5; .5 MG/3ML; MG/3ML
3 SOLUTION RESPIRATORY (INHALATION) EVERY 4 HOURS PRN
Status: DISCONTINUED | OUTPATIENT
Start: 2018-01-01 | End: 2018-01-01 | Stop reason: HOSPADM

## 2018-01-01 RX ORDER — ONDANSETRON 2 MG/ML
4 INJECTION INTRAMUSCULAR; INTRAVENOUS EVERY 6 HOURS PRN
Status: DISCONTINUED | OUTPATIENT
Start: 2018-01-01 | End: 2018-01-01 | Stop reason: HOSPADM

## 2018-01-01 RX ORDER — FENTANYL CITRATE 50 UG/ML
INJECTION, SOLUTION INTRAMUSCULAR; INTRAVENOUS PRN
Status: DISCONTINUED | OUTPATIENT
Start: 2018-01-01 | End: 2018-01-01

## 2018-01-01 RX ORDER — ACETAMINOPHEN 325 MG/1
650 TABLET ORAL EVERY 4 HOURS PRN
Status: DISCONTINUED | OUTPATIENT
Start: 2018-01-01 | End: 2018-01-01 | Stop reason: HOSPADM

## 2018-01-01 RX ORDER — LOSARTAN POTASSIUM 50 MG/1
100 TABLET ORAL DAILY
Status: DISCONTINUED | OUTPATIENT
Start: 2018-01-01 | End: 2018-01-01 | Stop reason: HOSPADM

## 2018-01-01 RX ORDER — ONDANSETRON 4 MG/1
4 TABLET, ORALLY DISINTEGRATING ORAL EVERY 30 MIN PRN
Status: DISCONTINUED | OUTPATIENT
Start: 2018-01-01 | End: 2018-01-01 | Stop reason: HOSPADM

## 2018-01-01 RX ORDER — BACLOFEN 10 MG/1
10 TABLET ORAL 3 TIMES DAILY
Status: DISCONTINUED | OUTPATIENT
Start: 2018-01-01 | End: 2018-01-01 | Stop reason: HOSPADM

## 2018-01-01 RX ORDER — FENTANYL CITRATE 50 UG/ML
25-50 INJECTION, SOLUTION INTRAMUSCULAR; INTRAVENOUS EVERY 5 MIN PRN
Status: DISCONTINUED | OUTPATIENT
Start: 2018-01-01 | End: 2018-01-01 | Stop reason: HOSPADM

## 2018-01-01 RX ORDER — ONDANSETRON 4 MG/1
4 TABLET, ORALLY DISINTEGRATING ORAL EVERY 6 HOURS PRN
Status: DISCONTINUED | OUTPATIENT
Start: 2018-01-01 | End: 2018-01-01 | Stop reason: HOSPADM

## 2018-01-01 RX ORDER — BISACODYL 10 MG
10 SUPPOSITORY, RECTAL RECTAL DAILY PRN
Status: DISCONTINUED | OUTPATIENT
Start: 2018-01-01 | End: 2018-01-01 | Stop reason: HOSPADM

## 2018-01-01 RX ORDER — TRIAMCINOLONE ACETONIDE 1 MG/G
OINTMENT TOPICAL 2 TIMES DAILY PRN
COMMUNITY

## 2018-01-01 RX ORDER — FLUTICASONE PROPIONATE 50 MCG
1-2 SPRAY, SUSPENSION (ML) NASAL DAILY
Status: DISCONTINUED | OUTPATIENT
Start: 2018-01-01 | End: 2018-01-01 | Stop reason: HOSPADM

## 2018-01-01 RX ORDER — PROPOFOL 10 MG/ML
INJECTION, EMULSION INTRAVENOUS PRN
Status: DISCONTINUED | OUTPATIENT
Start: 2018-01-01 | End: 2018-01-01

## 2018-01-01 RX ORDER — KETAMINE HYDROCHLORIDE 10 MG/ML
INJECTION INTRAMUSCULAR; INTRAVENOUS PRN
Status: DISCONTINUED | OUTPATIENT
Start: 2018-01-01 | End: 2018-01-01

## 2018-01-01 RX ORDER — HEPARIN SODIUM 5000 [USP'U]/.5ML
5000 INJECTION, SOLUTION INTRAVENOUS; SUBCUTANEOUS EVERY 12 HOURS
Status: DISCONTINUED | OUTPATIENT
Start: 2018-01-01 | End: 2018-01-01 | Stop reason: CLARIF

## 2018-01-01 RX ORDER — HYDROMORPHONE HYDROCHLORIDE 1 MG/ML
0.2 INJECTION, SOLUTION INTRAMUSCULAR; INTRAVENOUS; SUBCUTANEOUS
Status: DISCONTINUED | OUTPATIENT
Start: 2018-01-01 | End: 2018-01-01 | Stop reason: HOSPADM

## 2018-01-01 RX ORDER — ONDANSETRON 2 MG/ML
4 INJECTION INTRAMUSCULAR; INTRAVENOUS EVERY 30 MIN PRN
Status: DISCONTINUED | OUTPATIENT
Start: 2018-01-01 | End: 2018-01-01 | Stop reason: HOSPADM

## 2018-01-01 RX ORDER — ALBUTEROL SULFATE 0.83 MG/ML
1 SOLUTION RESPIRATORY (INHALATION) EVERY 4 HOURS PRN
Status: DISCONTINUED | OUTPATIENT
Start: 2018-01-01 | End: 2018-01-01 | Stop reason: HOSPADM

## 2018-01-01 RX ORDER — POTASSIUM CHLORIDE 750 MG/1
40 TABLET, EXTENDED RELEASE ORAL 2 TIMES DAILY
Status: COMPLETED | OUTPATIENT
Start: 2018-01-01 | End: 2018-01-01

## 2018-01-01 RX ORDER — FENTANYL 75 UG/1
75 PATCH TRANSDERMAL DAILY
Status: DISCONTINUED | OUTPATIENT
Start: 2018-01-01 | End: 2018-01-01

## 2018-01-01 RX ORDER — ALBUTEROL SULFATE 0.83 MG/ML
1 SOLUTION RESPIRATORY (INHALATION)
Status: DISCONTINUED | OUTPATIENT
Start: 2018-01-01 | End: 2018-01-01 | Stop reason: HOSPADM

## 2018-01-01 RX ORDER — SODIUM CHLORIDE 9 MG/ML
1000 INJECTION, SOLUTION INTRAVENOUS CONTINUOUS
Status: DISCONTINUED | OUTPATIENT
Start: 2018-01-01 | End: 2018-01-01

## 2018-01-01 RX ORDER — LABETALOL HYDROCHLORIDE 5 MG/ML
10 INJECTION, SOLUTION INTRAVENOUS
Status: DISCONTINUED | OUTPATIENT
Start: 2018-01-01 | End: 2018-01-01 | Stop reason: HOSPADM

## 2018-01-01 RX ORDER — AMOXICILLIN 250 MG
1 CAPSULE ORAL 2 TIMES DAILY PRN
Status: DISCONTINUED | OUTPATIENT
Start: 2018-01-01 | End: 2018-01-01 | Stop reason: HOSPADM

## 2018-01-01 RX ORDER — LABETALOL HYDROCHLORIDE 5 MG/ML
INJECTION, SOLUTION INTRAVENOUS
Status: COMPLETED
Start: 2018-01-01 | End: 2018-01-01

## 2018-01-01 RX ORDER — WARFARIN SODIUM 2 MG/1
TABLET ORAL
Qty: 30 TABLET | Refills: 1 | Status: SHIPPED | OUTPATIENT
Start: 2018-01-01

## 2018-01-01 RX ORDER — DOCUSATE SODIUM 100 MG/1
100 CAPSULE, LIQUID FILLED ORAL 2 TIMES DAILY
Status: DISCONTINUED | OUTPATIENT
Start: 2018-01-01 | End: 2018-01-01 | Stop reason: HOSPADM

## 2018-01-01 RX ORDER — SODIUM CHLORIDE 9 MG/ML
INJECTION, SOLUTION INTRAVENOUS CONTINUOUS
Status: DISCONTINUED | OUTPATIENT
Start: 2018-01-01 | End: 2018-01-01

## 2018-01-01 RX ORDER — RISPERIDONE 0.25 MG/1
0.25 TABLET ORAL AT BEDTIME
Status: DISCONTINUED | OUTPATIENT
Start: 2018-01-01 | End: 2018-01-01 | Stop reason: HOSPADM

## 2018-01-01 RX ORDER — METOPROLOL SUCCINATE 100 MG/1
100 TABLET, EXTENDED RELEASE ORAL DAILY
Status: DISCONTINUED | OUTPATIENT
Start: 2018-01-01 | End: 2018-01-01 | Stop reason: HOSPADM

## 2018-01-01 RX ORDER — WARFARIN SODIUM 2.5 MG/1
2.5 TABLET ORAL
Status: COMPLETED | OUTPATIENT
Start: 2018-01-01 | End: 2018-01-01

## 2018-01-01 RX ORDER — GABAPENTIN 100 MG/1
100 CAPSULE ORAL 3 TIMES DAILY
Status: DISCONTINUED | OUTPATIENT
Start: 2018-01-01 | End: 2018-01-01 | Stop reason: HOSPADM

## 2018-01-01 RX ORDER — SODIUM CHLORIDE, SODIUM LACTATE, POTASSIUM CHLORIDE, CALCIUM CHLORIDE 600; 310; 30; 20 MG/100ML; MG/100ML; MG/100ML; MG/100ML
INJECTION, SOLUTION INTRAVENOUS CONTINUOUS
Status: DISCONTINUED | OUTPATIENT
Start: 2018-01-01 | End: 2018-01-01 | Stop reason: HOSPADM

## 2018-01-01 RX ORDER — HYDROMORPHONE HYDROCHLORIDE 1 MG/ML
.3-.5 INJECTION, SOLUTION INTRAMUSCULAR; INTRAVENOUS; SUBCUTANEOUS EVERY 5 MIN PRN
Status: DISCONTINUED | OUTPATIENT
Start: 2018-01-01 | End: 2018-01-01 | Stop reason: HOSPADM

## 2018-01-01 RX ORDER — FENTANYL 75 UG/1
1 PATCH TRANSDERMAL DAILY
COMMUNITY

## 2018-01-01 RX ORDER — SODIUM CHLORIDE, SODIUM LACTATE, POTASSIUM CHLORIDE, CALCIUM CHLORIDE 600; 310; 30; 20 MG/100ML; MG/100ML; MG/100ML; MG/100ML
INJECTION, SOLUTION INTRAVENOUS CONTINUOUS
Status: DISCONTINUED | OUTPATIENT
Start: 2018-01-01 | End: 2018-01-01

## 2018-01-01 RX ORDER — AMLODIPINE BESYLATE 10 MG/1
10 TABLET ORAL DAILY
Status: DISCONTINUED | OUTPATIENT
Start: 2018-01-01 | End: 2018-01-01 | Stop reason: HOSPADM

## 2018-01-01 RX ORDER — AMLODIPINE BESYLATE 5 MG/1
5 TABLET ORAL DAILY
Status: DISCONTINUED | OUTPATIENT
Start: 2018-01-01 | End: 2018-01-01

## 2018-01-01 RX ORDER — DIPHENHYDRAMINE HYDROCHLORIDE, ZINC ACETATE 2; .1 G/100G; G/100G
2 CREAM TOPICAL 3 TIMES DAILY PRN
Status: DISCONTINUED | OUTPATIENT
Start: 2018-01-01 | End: 2018-01-01 | Stop reason: HOSPADM

## 2018-01-01 RX ORDER — NICOTINE 21 MG/24HR
1 PATCH, TRANSDERMAL 24 HOURS TRANSDERMAL DAILY
Status: DISCONTINUED | OUTPATIENT
Start: 2018-01-01 | End: 2018-01-01 | Stop reason: HOSPADM

## 2018-01-01 RX ORDER — HYDROXYZINE HYDROCHLORIDE 25 MG/1
50 TABLET, FILM COATED ORAL 3 TIMES DAILY PRN
Status: DISCONTINUED | OUTPATIENT
Start: 2018-01-01 | End: 2018-01-01 | Stop reason: HOSPADM

## 2018-01-01 RX ORDER — PHYTONADIONE 1 MG/.5ML
1 INJECTION, EMULSION INTRAMUSCULAR; INTRAVENOUS; SUBCUTANEOUS ONCE
Status: DISCONTINUED | OUTPATIENT
Start: 2018-01-01 | End: 2018-01-01

## 2018-01-01 RX ORDER — POTASSIUM CL/LIDO/0.9 % NACL 10MEQ/0.1L
10 INTRAVENOUS SOLUTION, PIGGYBACK (ML) INTRAVENOUS
Status: COMPLETED | OUTPATIENT
Start: 2018-01-01 | End: 2018-01-01

## 2018-01-01 RX ORDER — COLCHICINE 0.6 MG/1
0.6 TABLET ORAL DAILY PRN
Status: DISCONTINUED | OUTPATIENT
Start: 2018-01-01 | End: 2018-01-01 | Stop reason: HOSPADM

## 2018-01-01 RX ORDER — HYDROCODONE BITARTRATE AND ACETAMINOPHEN 10; 325 MG/1; MG/1
1 TABLET ORAL EVERY 6 HOURS PRN
Status: DISCONTINUED | OUTPATIENT
Start: 2018-01-01 | End: 2018-01-01 | Stop reason: HOSPADM

## 2018-01-01 RX ORDER — AMLODIPINE BESYLATE 10 MG/1
10 TABLET ORAL DAILY
Status: DISCONTINUED | OUTPATIENT
Start: 2018-01-01 | End: 2018-01-01

## 2018-01-01 RX ORDER — OXYCODONE HYDROCHLORIDE 5 MG/1
15 TABLET ORAL EVERY 8 HOURS PRN
Status: DISCONTINUED | OUTPATIENT
Start: 2018-01-01 | End: 2018-01-01 | Stop reason: HOSPADM

## 2018-01-01 RX ORDER — HALOPERIDOL 5 MG/ML
2-4 INJECTION INTRAMUSCULAR EVERY 4 HOURS PRN
Status: DISCONTINUED | OUTPATIENT
Start: 2018-01-01 | End: 2018-01-01 | Stop reason: HOSPADM

## 2018-01-01 RX ORDER — NALOXONE HYDROCHLORIDE 0.4 MG/ML
.1-.4 INJECTION, SOLUTION INTRAMUSCULAR; INTRAVENOUS; SUBCUTANEOUS
Status: DISCONTINUED | OUTPATIENT
Start: 2018-01-01 | End: 2018-01-01 | Stop reason: HOSPADM

## 2018-01-01 RX ORDER — ESOMEPRAZOLE MAGNESIUM 40 MG/1
40 CAPSULE, DELAYED RELEASE ORAL
Status: DISCONTINUED | OUTPATIENT
Start: 2018-01-01 | End: 2018-01-01 | Stop reason: CLARIF

## 2018-01-01 RX ORDER — ASPIRIN 81 MG/1
81 TABLET, CHEWABLE ORAL DAILY
Status: DISCONTINUED | OUTPATIENT
Start: 2018-01-01 | End: 2018-01-01 | Stop reason: HOSPADM

## 2018-01-01 RX ORDER — QUETIAPINE FUMARATE 25 MG/1
25 TABLET, FILM COATED ORAL EVERY 4 HOURS PRN
COMMUNITY

## 2018-01-01 RX ORDER — HALOPERIDOL 5 MG/1
5 TABLET ORAL EVERY 8 HOURS PRN
COMMUNITY

## 2018-01-01 RX ORDER — LEVOTHYROXINE SODIUM 125 UG/1
125 TABLET ORAL DAILY
Status: DISCONTINUED | OUTPATIENT
Start: 2018-01-01 | End: 2018-01-01 | Stop reason: HOSPADM

## 2018-01-01 RX ORDER — POTASSIUM CHLORIDE 750 MG/1
40 TABLET, EXTENDED RELEASE ORAL 3 TIMES DAILY
Status: DISCONTINUED | OUTPATIENT
Start: 2018-01-01 | End: 2018-01-01 | Stop reason: HOSPADM

## 2018-01-01 RX ORDER — PANTOPRAZOLE SODIUM 40 MG/1
40 TABLET, DELAYED RELEASE ORAL
Status: DISCONTINUED | OUTPATIENT
Start: 2018-01-01 | End: 2018-01-01 | Stop reason: HOSPADM

## 2018-01-01 RX ORDER — PROCHLORPERAZINE 25 MG
12.5 SUPPOSITORY, RECTAL RECTAL EVERY 12 HOURS PRN
Status: DISCONTINUED | OUTPATIENT
Start: 2018-01-01 | End: 2018-01-01 | Stop reason: HOSPADM

## 2018-01-01 RX ORDER — WARFARIN SODIUM 2 MG/1
2 TABLET ORAL
Status: DISCONTINUED | OUTPATIENT
Start: 2018-01-01 | End: 2018-01-01 | Stop reason: HOSPADM

## 2018-01-01 RX ORDER — MULTIPLE VITAMINS W/ MINERALS TAB 9MG-400MCG
1 TAB ORAL DAILY
COMMUNITY

## 2018-01-01 RX ORDER — HYDROMORPHONE HYDROCHLORIDE 1 MG/ML
0.5 INJECTION, SOLUTION INTRAMUSCULAR; INTRAVENOUS; SUBCUTANEOUS
Status: DISCONTINUED | OUTPATIENT
Start: 2018-01-01 | End: 2018-01-01

## 2018-01-01 RX ORDER — NALOXONE HYDROCHLORIDE 0.4 MG/ML
.1-.4 INJECTION, SOLUTION INTRAMUSCULAR; INTRAVENOUS; SUBCUTANEOUS
Status: ACTIVE | OUTPATIENT
Start: 2018-01-01 | End: 2018-01-01

## 2018-01-01 RX ORDER — LABETALOL HYDROCHLORIDE 5 MG/ML
INJECTION, SOLUTION INTRAVENOUS
Status: DISPENSED
Start: 2018-01-01 | End: 2018-01-01

## 2018-01-01 RX ORDER — LABETALOL HYDROCHLORIDE 5 MG/ML
20 INJECTION, SOLUTION INTRAVENOUS EVERY 10 MIN PRN
Status: DISCONTINUED | OUTPATIENT
Start: 2018-01-01 | End: 2018-01-01 | Stop reason: HOSPADM

## 2018-01-01 RX ORDER — SACCHAROMYCES BOULARDII 250 MG
250 CAPSULE ORAL 2 TIMES DAILY
COMMUNITY

## 2018-01-01 RX ORDER — LISINOPRIL 10 MG/1
10 TABLET ORAL DAILY
Status: DISCONTINUED | OUTPATIENT
Start: 2018-01-01 | End: 2018-01-01

## 2018-01-01 RX ORDER — HYDROMORPHONE HYDROCHLORIDE 1 MG/ML
0.5 INJECTION, SOLUTION INTRAMUSCULAR; INTRAVENOUS; SUBCUTANEOUS
Status: DISCONTINUED | OUTPATIENT
Start: 2018-01-01 | End: 2018-01-01 | Stop reason: HOSPADM

## 2018-01-01 RX ORDER — IPRATROPIUM BROMIDE AND ALBUTEROL SULFATE 2.5; .5 MG/3ML; MG/3ML
3 SOLUTION RESPIRATORY (INHALATION)
Status: DISCONTINUED | OUTPATIENT
Start: 2018-01-01 | End: 2018-01-01

## 2018-01-01 RX ORDER — LIDOCAINE 40 MG/G
CREAM TOPICAL
Status: DISCONTINUED | OUTPATIENT
Start: 2018-01-01 | End: 2018-01-01 | Stop reason: HOSPADM

## 2018-01-01 RX ORDER — AMLODIPINE BESYLATE 5 MG/1
5 TABLET ORAL ONCE
Status: DISCONTINUED | OUTPATIENT
Start: 2018-01-01 | End: 2018-01-01 | Stop reason: HOSPADM

## 2018-01-01 RX ORDER — AMOXICILLIN 250 MG
2 CAPSULE ORAL 2 TIMES DAILY PRN
Status: DISCONTINUED | OUTPATIENT
Start: 2018-01-01 | End: 2018-01-01 | Stop reason: HOSPADM

## 2018-01-01 RX ORDER — POTASSIUM CHLORIDE 1500 MG/1
20 TABLET, EXTENDED RELEASE ORAL DAILY
Qty: 30 TABLET | Refills: 1 | Status: SHIPPED | OUTPATIENT
Start: 2018-01-01

## 2018-01-01 RX ORDER — HALOPERIDOL 2 MG/ML
5 SOLUTION ORAL EVERY 8 HOURS PRN
Status: ON HOLD | COMMUNITY
End: 2018-01-01

## 2018-01-01 RX ORDER — HALOPERIDOL 5 MG/1
5 TABLET ORAL EVERY 8 HOURS PRN
Status: DISCONTINUED | OUTPATIENT
Start: 2018-01-01 | End: 2018-01-01 | Stop reason: HOSPADM

## 2018-01-01 RX ORDER — ESOMEPRAZOLE MAGNESIUM 40 MG/1
40 CAPSULE, DELAYED RELEASE ORAL
COMMUNITY

## 2018-01-01 RX ORDER — FENTANYL CITRATE 50 UG/ML
50 INJECTION, SOLUTION INTRAMUSCULAR; INTRAVENOUS ONCE
Status: COMPLETED | OUTPATIENT
Start: 2018-01-01 | End: 2018-01-01

## 2018-01-01 RX ORDER — MEPERIDINE HYDROCHLORIDE 25 MG/ML
12.5 INJECTION INTRAMUSCULAR; INTRAVENOUS; SUBCUTANEOUS EVERY 5 MIN PRN
Status: DISCONTINUED | OUTPATIENT
Start: 2018-01-01 | End: 2018-01-01 | Stop reason: HOSPADM

## 2018-01-01 RX ADMIN — GABAPENTIN 100 MG: 100 CAPSULE ORAL at 09:00

## 2018-01-01 RX ADMIN — RISPERIDONE 0.25 MG: 0.25 TABLET ORAL at 21:06

## 2018-01-01 RX ADMIN — LOSARTAN POTASSIUM 100 MG: 50 TABLET, FILM COATED ORAL at 09:27

## 2018-01-01 RX ADMIN — NICOTINE 1 PATCH: 14 PATCH, EXTENDED RELEASE TRANSDERMAL at 17:28

## 2018-01-01 RX ADMIN — BACLOFEN 10 MG: 10 TABLET ORAL at 22:31

## 2018-01-01 RX ADMIN — GABAPENTIN 100 MG: 100 CAPSULE ORAL at 14:12

## 2018-01-01 RX ADMIN — HYDROMORPHONE HYDROCHLORIDE 0.5 MG: 1 INJECTION, SOLUTION INTRAMUSCULAR; INTRAVENOUS; SUBCUTANEOUS at 09:30

## 2018-01-01 RX ADMIN — WARFARIN SODIUM 5 MG: 5 TABLET ORAL at 17:25

## 2018-01-01 RX ADMIN — FLUTICASONE FUROATE AND VILANTEROL TRIFENATATE 1 PUFF: 200; 25 POWDER RESPIRATORY (INHALATION) at 07:30

## 2018-01-01 RX ADMIN — GABAPENTIN 100 MG: 100 CAPSULE ORAL at 15:59

## 2018-01-01 RX ADMIN — Medication 10 MEQ: at 09:33

## 2018-01-01 RX ADMIN — HYDROCODONE BITARTRATE AND ACETAMINOPHEN 1 TABLET: 10; 325 TABLET ORAL at 06:25

## 2018-01-01 RX ADMIN — TAZOBACTAM SODIUM AND PIPERACILLIN SODIUM 3.38 G: 375; 3 INJECTION, SOLUTION INTRAVENOUS at 09:33

## 2018-01-01 RX ADMIN — GABAPENTIN 100 MG: 100 CAPSULE ORAL at 14:50

## 2018-01-01 RX ADMIN — Medication 10 MG: at 15:59

## 2018-01-01 RX ADMIN — FENTANYL CITRATE 50 MCG: 50 INJECTION, SOLUTION INTRAMUSCULAR; INTRAVENOUS at 11:12

## 2018-01-01 RX ADMIN — Medication 10 MG: at 21:52

## 2018-01-01 RX ADMIN — WARFARIN SODIUM 2.5 MG: 2.5 TABLET ORAL at 18:14

## 2018-01-01 RX ADMIN — LIDOCAINE HYDROCHLORIDE 10 ML: 20 JELLY TOPICAL at 17:37

## 2018-01-01 RX ADMIN — PANTOPRAZOLE SODIUM 40 MG: 40 TABLET, DELAYED RELEASE ORAL at 06:22

## 2018-01-01 RX ADMIN — TAZOBACTAM SODIUM AND PIPERACILLIN SODIUM 3.38 G: 375; 3 INJECTION, SOLUTION INTRAVENOUS at 04:06

## 2018-01-01 RX ADMIN — FENTANYL CITRATE 25 MCG: 50 INJECTION, SOLUTION INTRAMUSCULAR; INTRAVENOUS at 09:43

## 2018-01-01 RX ADMIN — HYDROCODONE BITARTRATE AND ACETAMINOPHEN 1 TABLET: 10; 325 TABLET ORAL at 18:16

## 2018-01-01 RX ADMIN — HYDROCODONE BITARTRATE AND ACETAMINOPHEN 1 TABLET: 10; 325 TABLET ORAL at 20:55

## 2018-01-01 RX ADMIN — METOPROLOL SUCCINATE 100 MG: 100 TABLET, EXTENDED RELEASE ORAL at 09:40

## 2018-01-01 RX ADMIN — HYDROMORPHONE HYDROCHLORIDE 0.5 MG: 1 INJECTION, SOLUTION INTRAMUSCULAR; INTRAVENOUS; SUBCUTANEOUS at 22:39

## 2018-01-01 RX ADMIN — TAZOBACTAM SODIUM AND PIPERACILLIN SODIUM 4.5 G: 500; 4 INJECTION, SOLUTION INTRAVENOUS at 18:37

## 2018-01-01 RX ADMIN — SODIUM CHLORIDE, POTASSIUM CHLORIDE, SODIUM LACTATE AND CALCIUM CHLORIDE: 600; 310; 30; 20 INJECTION, SOLUTION INTRAVENOUS at 11:50

## 2018-01-01 RX ADMIN — BACLOFEN 10 MG: 10 TABLET ORAL at 08:52

## 2018-01-01 RX ADMIN — HYDROMORPHONE HYDROCHLORIDE 0.2 MG: 1 INJECTION, SOLUTION INTRAMUSCULAR; INTRAVENOUS; SUBCUTANEOUS at 09:07

## 2018-01-01 RX ADMIN — TAZOBACTAM SODIUM AND PIPERACILLIN SODIUM 3.38 G: 375; 3 INJECTION, SOLUTION INTRAVENOUS at 18:14

## 2018-01-01 RX ADMIN — AMLODIPINE BESYLATE 5 MG: 5 TABLET ORAL at 21:38

## 2018-01-01 RX ADMIN — LABETALOL HYDROCHLORIDE 20 MG: 5 INJECTION, SOLUTION INTRAVENOUS at 03:13

## 2018-01-01 RX ADMIN — HALOPERIDOL 5 MG: 5 TABLET ORAL at 16:25

## 2018-01-01 RX ADMIN — METOPROLOL SUCCINATE 100 MG: 100 TABLET, EXTENDED RELEASE ORAL at 10:08

## 2018-01-01 RX ADMIN — HYDROCODONE BITARTRATE AND ACETAMINOPHEN 1 TABLET: 10; 325 TABLET ORAL at 13:33

## 2018-01-01 RX ADMIN — NICOTINE 1 PATCH: 21 PATCH, EXTENDED RELEASE TRANSDERMAL at 14:49

## 2018-01-01 RX ADMIN — BACLOFEN 10 MG: 10 TABLET ORAL at 22:10

## 2018-01-01 RX ADMIN — GABAPENTIN 100 MG: 100 CAPSULE ORAL at 08:22

## 2018-01-01 RX ADMIN — LABETALOL HYDROCHLORIDE 20 MG: 5 INJECTION, SOLUTION INTRAVENOUS at 21:59

## 2018-01-01 RX ADMIN — DOCUSATE SODIUM 100 MG: 100 CAPSULE, LIQUID FILLED ORAL at 21:52

## 2018-01-01 RX ADMIN — FLUTICASONE FUROATE AND VILANTEROL TRIFENATATE 1 PUFF: 200; 25 POWDER RESPIRATORY (INHALATION) at 14:51

## 2018-01-01 RX ADMIN — HYDROCODONE BITARTRATE AND ACETAMINOPHEN 1 TABLET: 10; 325 TABLET ORAL at 04:15

## 2018-01-01 RX ADMIN — TAZOBACTAM SODIUM AND PIPERACILLIN SODIUM 3.38 G: 375; 3 INJECTION, SOLUTION INTRAVENOUS at 02:05

## 2018-01-01 RX ADMIN — FLUTICASONE FUROATE AND VILANTEROL TRIFENATATE 1 PUFF: 200; 25 POWDER RESPIRATORY (INHALATION) at 07:27

## 2018-01-01 RX ADMIN — HYDROMORPHONE HYDROCHLORIDE 0.2 MG: 1 INJECTION, SOLUTION INTRAMUSCULAR; INTRAVENOUS; SUBCUTANEOUS at 16:14

## 2018-01-01 RX ADMIN — SODIUM CHLORIDE, POTASSIUM CHLORIDE, SODIUM LACTATE AND CALCIUM CHLORIDE 1000 ML: 600; 310; 30; 20 INJECTION, SOLUTION INTRAVENOUS at 13:27

## 2018-01-01 RX ADMIN — AMLODIPINE BESYLATE 5 MG: 5 TABLET ORAL at 09:27

## 2018-01-01 RX ADMIN — HYDROCODONE BITARTRATE AND ACETAMINOPHEN 1 TABLET: 10; 325 TABLET ORAL at 04:34

## 2018-01-01 RX ADMIN — GABAPENTIN 100 MG: 100 CAPSULE ORAL at 09:40

## 2018-01-01 RX ADMIN — DOCUSATE SODIUM 100 MG: 100 CAPSULE, LIQUID FILLED ORAL at 21:06

## 2018-01-01 RX ADMIN — OXYCODONE HYDROCHLORIDE 15 MG: 5 TABLET ORAL at 09:37

## 2018-01-01 RX ADMIN — AMLODIPINE BESYLATE 5 MG: 5 TABLET ORAL at 09:28

## 2018-01-01 RX ADMIN — POTASSIUM CHLORIDE 40 MEQ: 1500 TABLET, EXTENDED RELEASE ORAL at 09:00

## 2018-01-01 RX ADMIN — GABAPENTIN 100 MG: 100 CAPSULE ORAL at 13:48

## 2018-01-01 RX ADMIN — LEVOTHYROXINE SODIUM 125 MCG: 100 TABLET ORAL at 10:08

## 2018-01-01 RX ADMIN — IPRATROPIUM BROMIDE AND ALBUTEROL SULFATE 3 ML: .5; 3 SOLUTION RESPIRATORY (INHALATION) at 14:49

## 2018-01-01 RX ADMIN — POTASSIUM CHLORIDE 40 MEQ: 1500 TABLET, EXTENDED RELEASE ORAL at 12:46

## 2018-01-01 RX ADMIN — VANCOMYCIN HYDROCHLORIDE 2000 MG: 1 INJECTION, POWDER, LYOPHILIZED, FOR SOLUTION INTRAVENOUS at 02:17

## 2018-01-01 RX ADMIN — METOPROLOL SUCCINATE 100 MG: 100 TABLET, EXTENDED RELEASE ORAL at 14:38

## 2018-01-01 RX ADMIN — IPRATROPIUM BROMIDE AND ALBUTEROL SULFATE 3 ML: .5; 3 SOLUTION RESPIRATORY (INHALATION) at 19:33

## 2018-01-01 RX ADMIN — ROCURONIUM BROMIDE 35 MG: 10 INJECTION INTRAVENOUS at 09:24

## 2018-01-01 RX ADMIN — IPRATROPIUM BROMIDE AND ALBUTEROL SULFATE 3 ML: .5; 3 SOLUTION RESPIRATORY (INHALATION) at 07:26

## 2018-01-01 RX ADMIN — ASPIRIN 81 MG 81 MG: 81 TABLET ORAL at 10:08

## 2018-01-01 RX ADMIN — POTASSIUM CHLORIDE 40 MEQ: 750 TABLET, FILM COATED, EXTENDED RELEASE ORAL at 09:26

## 2018-01-01 RX ADMIN — SODIUM CHLORIDE, POTASSIUM CHLORIDE, SODIUM LACTATE AND CALCIUM CHLORIDE: 600; 310; 30; 20 INJECTION, SOLUTION INTRAVENOUS at 01:55

## 2018-01-01 RX ADMIN — TAZOBACTAM SODIUM AND PIPERACILLIN SODIUM 3.38 G: 375; 3 INJECTION, SOLUTION INTRAVENOUS at 18:51

## 2018-01-01 RX ADMIN — Medication 10 MEQ: at 10:42

## 2018-01-01 RX ADMIN — TAZOBACTAM SODIUM AND PIPERACILLIN SODIUM 3.38 G: 375; 3 INJECTION, SOLUTION INTRAVENOUS at 00:42

## 2018-01-01 RX ADMIN — Medication 10 MG: at 21:06

## 2018-01-01 RX ADMIN — POTASSIUM CHLORIDE 40 MEQ: 750 TABLET, FILM COATED, EXTENDED RELEASE ORAL at 09:41

## 2018-01-01 RX ADMIN — FENTANYL CITRATE 50 MCG: 50 INJECTION, SOLUTION INTRAMUSCULAR; INTRAVENOUS at 14:00

## 2018-01-01 RX ADMIN — Medication 10 MG: at 08:22

## 2018-01-01 RX ADMIN — LEVOTHYROXINE SODIUM 125 MCG: 125 TABLET ORAL at 14:38

## 2018-01-01 RX ADMIN — TAZOBACTAM SODIUM AND PIPERACILLIN SODIUM 3.38 G: 375; 3 INJECTION, SOLUTION INTRAVENOUS at 20:14

## 2018-01-01 RX ADMIN — FLUTICASONE PROPIONATE 2 SPRAY: 50 SPRAY, METERED NASAL at 10:27

## 2018-01-01 RX ADMIN — OXYCODONE HYDROCHLORIDE 15 MG: 5 TABLET ORAL at 10:41

## 2018-01-01 RX ADMIN — PHYTONADIONE 2 MG: 10 INJECTION, EMULSION INTRAMUSCULAR; INTRAVENOUS; SUBCUTANEOUS at 10:00

## 2018-01-01 RX ADMIN — BACLOFEN 10 MG: 10 TABLET ORAL at 14:50

## 2018-01-01 RX ADMIN — TAZOBACTAM SODIUM AND PIPERACILLIN SODIUM 3.38 G: 375; 3 INJECTION, SOLUTION INTRAVENOUS at 15:50

## 2018-01-01 RX ADMIN — METOPROLOL SUCCINATE 100 MG: 100 TABLET, EXTENDED RELEASE ORAL at 09:26

## 2018-01-01 RX ADMIN — TAZOBACTAM SODIUM AND PIPERACILLIN SODIUM 3.38 G: 375; 3 INJECTION, SOLUTION INTRAVENOUS at 02:55

## 2018-01-01 RX ADMIN — WARFARIN SODIUM 5 MG: 5 TABLET ORAL at 17:14

## 2018-01-01 RX ADMIN — HYDROMORPHONE HYDROCHLORIDE 1 MG: 1 INJECTION, SOLUTION INTRAMUSCULAR; INTRAVENOUS; SUBCUTANEOUS at 09:48

## 2018-01-01 RX ADMIN — GABAPENTIN 100 MG: 100 CAPSULE ORAL at 20:08

## 2018-01-01 RX ADMIN — CLINDAMYCIN HYDROCHLORIDE 300 MG: 300 CAPSULE ORAL at 01:58

## 2018-01-01 RX ADMIN — AMLODIPINE BESYLATE 5 MG: 5 TABLET ORAL at 09:00

## 2018-01-01 RX ADMIN — METOPROLOL SUCCINATE 100 MG: 100 TABLET, EXTENDED RELEASE ORAL at 09:00

## 2018-01-01 RX ADMIN — HYDROMORPHONE HYDROCHLORIDE 0.5 MG: 1 INJECTION, SOLUTION INTRAMUSCULAR; INTRAVENOUS; SUBCUTANEOUS at 18:37

## 2018-01-01 RX ADMIN — GABAPENTIN 100 MG: 100 CAPSULE ORAL at 14:56

## 2018-01-01 RX ADMIN — AMLODIPINE BESYLATE 5 MG: 5 TABLET ORAL at 09:40

## 2018-01-01 RX ADMIN — HYDROMORPHONE HYDROCHLORIDE 0.5 MG: 1 INJECTION, SOLUTION INTRAMUSCULAR; INTRAVENOUS; SUBCUTANEOUS at 00:26

## 2018-01-01 RX ADMIN — ONDANSETRON 4 MG: 4 TABLET, ORALLY DISINTEGRATING ORAL at 11:27

## 2018-01-01 RX ADMIN — LEVOTHYROXINE SODIUM 125 MCG: 125 TABLET ORAL at 06:25

## 2018-01-01 RX ADMIN — TAZOBACTAM SODIUM AND PIPERACILLIN SODIUM 3.38 G: 375; 3 INJECTION, SOLUTION INTRAVENOUS at 10:18

## 2018-01-01 RX ADMIN — TAZOBACTAM SODIUM AND PIPERACILLIN SODIUM 3.38 G: 375; 3 INJECTION, SOLUTION INTRAVENOUS at 22:49

## 2018-01-01 RX ADMIN — NICOTINE 1 PATCH: 21 PATCH, EXTENDED RELEASE TRANSDERMAL at 15:02

## 2018-01-01 RX ADMIN — CLINDAMYCIN HYDROCHLORIDE 300 MG: 300 CAPSULE ORAL at 17:55

## 2018-01-01 RX ADMIN — GABAPENTIN 100 MG: 100 CAPSULE ORAL at 21:52

## 2018-01-01 RX ADMIN — LOSARTAN POTASSIUM 100 MG: 50 TABLET, FILM COATED ORAL at 08:52

## 2018-01-01 RX ADMIN — HYDROCODONE BITARTRATE AND ACETAMINOPHEN 1 TABLET: 10; 325 TABLET ORAL at 12:27

## 2018-01-01 RX ADMIN — LEVOTHYROXINE SODIUM 125 MCG: 100 TABLET ORAL at 09:28

## 2018-01-01 RX ADMIN — NICOTINE 1 PATCH: 14 PATCH, EXTENDED RELEASE TRANSDERMAL at 09:28

## 2018-01-01 RX ADMIN — OXYCODONE HYDROCHLORIDE 15 MG: 5 TABLET ORAL at 17:56

## 2018-01-01 RX ADMIN — GABAPENTIN 100 MG: 100 CAPSULE ORAL at 08:52

## 2018-01-01 RX ADMIN — Medication 10 MG: at 14:56

## 2018-01-01 RX ADMIN — LEVOTHYROXINE SODIUM 125 MCG: 125 TABLET ORAL at 06:22

## 2018-01-01 RX ADMIN — LEVOTHYROXINE SODIUM 125 MCG: 125 TABLET ORAL at 06:30

## 2018-01-01 RX ADMIN — AMLODIPINE BESYLATE 5 MG: 5 TABLET ORAL at 10:08

## 2018-01-01 RX ADMIN — VANCOMYCIN HYDROCHLORIDE 2000 MG: 1 INJECTION, POWDER, LYOPHILIZED, FOR SOLUTION INTRAVENOUS at 13:08

## 2018-01-01 RX ADMIN — HALOPERIDOL LACTATE 2 MG: 5 INJECTION, SOLUTION INTRAMUSCULAR at 20:21

## 2018-01-01 RX ADMIN — ACETAMINOPHEN 650 MG: 325 TABLET, FILM COATED ORAL at 19:34

## 2018-01-01 RX ADMIN — PANTOPRAZOLE SODIUM 40 MG: 40 TABLET, DELAYED RELEASE ORAL at 06:33

## 2018-01-01 RX ADMIN — AMLODIPINE BESYLATE 5 MG: 5 TABLET ORAL at 16:05

## 2018-01-01 RX ADMIN — TAZOBACTAM SODIUM AND PIPERACILLIN SODIUM 3.38 G: 375; 3 INJECTION, SOLUTION INTRAVENOUS at 12:44

## 2018-01-01 RX ADMIN — TAZOBACTAM SODIUM AND PIPERACILLIN SODIUM 3.38 G: 375; 3 INJECTION, SOLUTION INTRAVENOUS at 12:24

## 2018-01-01 RX ADMIN — ASPIRIN 81 MG 81 MG: 81 TABLET ORAL at 09:40

## 2018-01-01 RX ADMIN — SODIUM CHLORIDE, POTASSIUM CHLORIDE, SODIUM LACTATE AND CALCIUM CHLORIDE 1000 ML: 600; 310; 30; 20 INJECTION, SOLUTION INTRAVENOUS at 14:40

## 2018-01-01 RX ADMIN — VANCOMYCIN HYDROCHLORIDE 1500 MG: 1 INJECTION, POWDER, LYOPHILIZED, FOR SOLUTION INTRAVENOUS at 14:49

## 2018-01-01 RX ADMIN — METOPROLOL SUCCINATE 100 MG: 100 TABLET, EXTENDED RELEASE ORAL at 08:37

## 2018-01-01 RX ADMIN — HYDROCODONE BITARTRATE AND ACETAMINOPHEN 1 TABLET: 10; 325 TABLET ORAL at 04:26

## 2018-01-01 RX ADMIN — HYDROMORPHONE HYDROCHLORIDE 0.5 MG: 1 INJECTION, SOLUTION INTRAMUSCULAR; INTRAVENOUS; SUBCUTANEOUS at 04:52

## 2018-01-01 RX ADMIN — FLUTICASONE PROPIONATE 1 SPRAY: 50 SPRAY, METERED NASAL at 09:32

## 2018-01-01 RX ADMIN — LOSARTAN POTASSIUM 100 MG: 50 TABLET, FILM COATED ORAL at 08:37

## 2018-01-01 RX ADMIN — HYDROCODONE BITARTRATE AND ACETAMINOPHEN 1 TABLET: 10; 325 TABLET ORAL at 12:47

## 2018-01-01 RX ADMIN — GABAPENTIN 100 MG: 100 CAPSULE ORAL at 10:08

## 2018-01-01 RX ADMIN — GABAPENTIN 100 MG: 100 CAPSULE ORAL at 21:06

## 2018-01-01 RX ADMIN — SODIUM CHLORIDE, POTASSIUM CHLORIDE, SODIUM LACTATE AND CALCIUM CHLORIDE: 600; 310; 30; 20 INJECTION, SOLUTION INTRAVENOUS at 06:10

## 2018-01-01 RX ADMIN — GABAPENTIN 100 MG: 100 CAPSULE ORAL at 14:38

## 2018-01-01 RX ADMIN — PANTOPRAZOLE SODIUM 40 MG: 40 TABLET, DELAYED RELEASE ORAL at 06:30

## 2018-01-01 RX ADMIN — GABAPENTIN 100 MG: 100 CAPSULE ORAL at 09:27

## 2018-01-01 RX ADMIN — Medication 50 MG: at 09:23

## 2018-01-01 RX ADMIN — RISPERIDONE 0.25 MG: 0.25 TABLET ORAL at 21:36

## 2018-01-01 RX ADMIN — METOPROLOL SUCCINATE 100 MG: 100 TABLET, EXTENDED RELEASE ORAL at 08:52

## 2018-01-01 RX ADMIN — CLINDAMYCIN HYDROCHLORIDE 300 MG: 300 CAPSULE ORAL at 10:21

## 2018-01-01 RX ADMIN — LABETALOL HYDROCHLORIDE 20 MG: 5 INJECTION, SOLUTION INTRAVENOUS at 04:14

## 2018-01-01 RX ADMIN — TAZOBACTAM SODIUM AND PIPERACILLIN SODIUM 3.38 G: 375; 3 INJECTION, SOLUTION INTRAVENOUS at 12:21

## 2018-01-01 RX ADMIN — TAZOBACTAM SODIUM AND PIPERACILLIN SODIUM 3.38 G: 375; 3 INJECTION, SOLUTION INTRAVENOUS at 05:57

## 2018-01-01 RX ADMIN — PANTOPRAZOLE SODIUM 40 MG: 40 TABLET, DELAYED RELEASE ORAL at 06:32

## 2018-01-01 RX ADMIN — FLUTICASONE FUROATE AND VILANTEROL TRIFENATATE 1 PUFF: 200; 25 POWDER RESPIRATORY (INHALATION) at 09:19

## 2018-01-01 RX ADMIN — DOCUSATE SODIUM 100 MG: 100 CAPSULE, LIQUID FILLED ORAL at 09:27

## 2018-01-01 RX ADMIN — Medication 10 MG: at 21:50

## 2018-01-01 RX ADMIN — FLUTICASONE PROPIONATE 2 SPRAY: 50 SPRAY, METERED NASAL at 09:09

## 2018-01-01 RX ADMIN — BACLOFEN 10 MG: 10 TABLET ORAL at 14:12

## 2018-01-01 RX ADMIN — HYDROMORPHONE HYDROCHLORIDE 0.5 MG: 1 INJECTION, SOLUTION INTRAMUSCULAR; INTRAVENOUS; SUBCUTANEOUS at 14:12

## 2018-01-01 RX ADMIN — POTASSIUM CHLORIDE 40 MEQ: 750 TABLET, FILM COATED, EXTENDED RELEASE ORAL at 13:22

## 2018-01-01 RX ADMIN — TAZOBACTAM SODIUM AND PIPERACILLIN SODIUM 3.38 G: 375; 3 INJECTION, SOLUTION INTRAVENOUS at 06:35

## 2018-01-01 RX ADMIN — RISPERIDONE 0.25 MG: 0.25 TABLET ORAL at 21:52

## 2018-01-01 RX ADMIN — HYDROCODONE BITARTRATE AND ACETAMINOPHEN 1 TABLET: 10; 325 TABLET ORAL at 22:17

## 2018-01-01 RX ADMIN — HYDROCODONE BITARTRATE AND ACETAMINOPHEN 1 TABLET: 10; 325 TABLET ORAL at 10:26

## 2018-01-01 RX ADMIN — HEPARIN SODIUM 5000 UNITS: 10000 INJECTION, SOLUTION INTRAVENOUS; SUBCUTANEOUS at 21:41

## 2018-01-01 RX ADMIN — AMLODIPINE BESYLATE 5 MG: 5 TABLET ORAL at 08:18

## 2018-01-01 RX ADMIN — HYDROCODONE BITARTRATE AND ACETAMINOPHEN 1 TABLET: 10; 325 TABLET ORAL at 22:31

## 2018-01-01 RX ADMIN — NICOTINE 1 PATCH: 21 PATCH, EXTENDED RELEASE TRANSDERMAL at 14:37

## 2018-01-01 RX ADMIN — WARFARIN SODIUM 2.5 MG: 2.5 TABLET ORAL at 20:08

## 2018-01-01 RX ADMIN — IPRATROPIUM BROMIDE AND ALBUTEROL SULFATE 3 ML: .5; 3 SOLUTION RESPIRATORY (INHALATION) at 19:31

## 2018-01-01 RX ADMIN — AMLODIPINE BESYLATE 5 MG: 5 TABLET ORAL at 08:52

## 2018-01-01 RX ADMIN — VANCOMYCIN HYDROCHLORIDE 2000 MG: 1 INJECTION, POWDER, LYOPHILIZED, FOR SOLUTION INTRAVENOUS at 08:00

## 2018-01-01 RX ADMIN — BACLOFEN 10 MG: 10 TABLET ORAL at 09:00

## 2018-01-01 RX ADMIN — IPRATROPIUM BROMIDE AND ALBUTEROL SULFATE 3 ML: .5; 3 SOLUTION RESPIRATORY (INHALATION) at 07:29

## 2018-01-01 RX ADMIN — SODIUM CHLORIDE, POTASSIUM CHLORIDE, SODIUM LACTATE AND CALCIUM CHLORIDE 1790 ML: 600; 310; 30; 20 INJECTION, SOLUTION INTRAVENOUS at 18:02

## 2018-01-01 RX ADMIN — WARFARIN SODIUM 2.5 MG: 2.5 TABLET ORAL at 18:42

## 2018-01-01 RX ADMIN — GABAPENTIN 100 MG: 100 CAPSULE ORAL at 22:10

## 2018-01-01 RX ADMIN — GABAPENTIN 100 MG: 100 CAPSULE ORAL at 08:37

## 2018-01-01 RX ADMIN — POTASSIUM CHLORIDE 40 MEQ: 750 TABLET, FILM COATED, EXTENDED RELEASE ORAL at 21:52

## 2018-01-01 RX ADMIN — FLUTICASONE FUROATE AND VILANTEROL TRIFENATATE 1 PUFF: 200; 25 POWDER RESPIRATORY (INHALATION) at 08:37

## 2018-01-01 RX ADMIN — HEPARIN SODIUM 5000 UNITS: 10000 INJECTION, SOLUTION INTRAVENOUS; SUBCUTANEOUS at 09:34

## 2018-01-01 RX ADMIN — METOPROLOL SUCCINATE 100 MG: 100 TABLET, EXTENDED RELEASE ORAL at 08:18

## 2018-01-01 RX ADMIN — TAZOBACTAM SODIUM AND PIPERACILLIN SODIUM 3.38 G: 375; 3 INJECTION, SOLUTION INTRAVENOUS at 00:54

## 2018-01-01 RX ADMIN — IPRATROPIUM BROMIDE AND ALBUTEROL SULFATE 3 ML: .5; 3 SOLUTION RESPIRATORY (INHALATION) at 14:53

## 2018-01-01 RX ADMIN — TAZOBACTAM SODIUM AND PIPERACILLIN SODIUM 3.38 G: 375; 3 INJECTION, SOLUTION INTRAVENOUS at 16:00

## 2018-01-01 RX ADMIN — LOSARTAN POTASSIUM 100 MG: 50 TABLET, FILM COATED ORAL at 09:40

## 2018-01-01 RX ADMIN — HALOPERIDOL 5 MG: 5 TABLET ORAL at 02:12

## 2018-01-01 RX ADMIN — NICOTINE 1 PATCH: 21 PATCH, EXTENDED RELEASE TRANSDERMAL at 14:12

## 2018-01-01 RX ADMIN — NICOTINE 1 PATCH: 21 PATCH, EXTENDED RELEASE TRANSDERMAL at 14:54

## 2018-01-01 RX ADMIN — LEVOTHYROXINE SODIUM 125 MCG: 100 TABLET ORAL at 09:41

## 2018-01-01 RX ADMIN — LOSARTAN POTASSIUM 100 MG: 50 TABLET, FILM COATED ORAL at 15:59

## 2018-01-01 RX ADMIN — BACLOFEN 10 MG: 10 TABLET ORAL at 20:55

## 2018-01-01 RX ADMIN — BACLOFEN 10 MG: 10 TABLET ORAL at 13:33

## 2018-01-01 RX ADMIN — Medication 10 MG: at 10:12

## 2018-01-01 RX ADMIN — ASPIRIN 81 MG 81 MG: 81 TABLET ORAL at 09:27

## 2018-01-01 RX ADMIN — SODIUM CHLORIDE 1000 ML: 9 INJECTION, SOLUTION INTRAVENOUS at 16:42

## 2018-01-01 RX ADMIN — TAZOBACTAM SODIUM AND PIPERACILLIN SODIUM 3.38 G: 375; 3 INJECTION, SOLUTION INTRAVENOUS at 22:09

## 2018-01-01 RX ADMIN — BACLOFEN 10 MG: 10 TABLET ORAL at 08:37

## 2018-01-01 RX ADMIN — SODIUM CHLORIDE, POTASSIUM CHLORIDE, SODIUM LACTATE AND CALCIUM CHLORIDE: 600; 310; 30; 20 INJECTION, SOLUTION INTRAVENOUS at 01:23

## 2018-01-01 RX ADMIN — BACLOFEN 10 MG: 10 TABLET ORAL at 20:08

## 2018-01-01 RX ADMIN — HYDROMORPHONE HYDROCHLORIDE 0.5 MG: 1 INJECTION, SOLUTION INTRAMUSCULAR; INTRAVENOUS; SUBCUTANEOUS at 02:13

## 2018-01-01 RX ADMIN — HYDROMORPHONE HYDROCHLORIDE 0.2 MG: 1 INJECTION, SOLUTION INTRAMUSCULAR; INTRAVENOUS; SUBCUTANEOUS at 20:10

## 2018-01-01 RX ADMIN — PROPOFOL 100 MG: 10 INJECTION, EMULSION INTRAVENOUS at 09:23

## 2018-01-01 RX ADMIN — HALOPERIDOL 5 MG: 5 TABLET ORAL at 16:00

## 2018-01-01 RX ADMIN — POTASSIUM CHLORIDE 40 MEQ: 750 TABLET, FILM COATED, EXTENDED RELEASE ORAL at 11:25

## 2018-01-01 RX ADMIN — FENTANYL CITRATE 50 MCG: 50 INJECTION, SOLUTION INTRAMUSCULAR; INTRAVENOUS at 09:19

## 2018-01-01 RX ADMIN — AMLODIPINE BESYLATE 5 MG: 5 TABLET ORAL at 14:38

## 2018-01-01 RX ADMIN — BACLOFEN 10 MG: 10 TABLET ORAL at 09:26

## 2018-01-01 RX ADMIN — VANCOMYCIN HYDROCHLORIDE 2000 MG: 1 INJECTION, POWDER, LYOPHILIZED, FOR SOLUTION INTRAVENOUS at 19:14

## 2018-01-01 RX ADMIN — BACLOFEN 10 MG: 10 TABLET ORAL at 13:49

## 2018-01-01 RX ADMIN — Medication 10 MG: at 09:41

## 2018-01-01 RX ADMIN — METOPROLOL SUCCINATE 100 MG: 100 TABLET, EXTENDED RELEASE ORAL at 09:28

## 2018-01-01 RX ADMIN — LABETALOL HYDROCHLORIDE 20 MG: 5 INJECTION, SOLUTION INTRAVENOUS at 18:36

## 2018-01-01 RX ADMIN — IPRATROPIUM BROMIDE AND ALBUTEROL SULFATE 3 ML: .5; 3 SOLUTION RESPIRATORY (INHALATION) at 10:54

## 2018-01-01 RX ADMIN — HYDROMORPHONE HYDROCHLORIDE 0.5 MG: 1 INJECTION, SOLUTION INTRAMUSCULAR; INTRAVENOUS; SUBCUTANEOUS at 06:35

## 2018-01-01 RX ADMIN — Medication 10 MG: at 13:22

## 2018-01-01 RX ADMIN — IPRATROPIUM BROMIDE AND ALBUTEROL SULFATE 3 ML: .5; 3 SOLUTION RESPIRATORY (INHALATION) at 14:51

## 2018-01-01 RX ADMIN — LIDOCAINE HYDROCHLORIDE 80 MG: 20 INJECTION, SOLUTION INFILTRATION; PERINEURAL at 09:23

## 2018-01-01 RX ADMIN — SODIUM CHLORIDE, POTASSIUM CHLORIDE, SODIUM LACTATE AND CALCIUM CHLORIDE: 600; 310; 30; 20 INJECTION, SOLUTION INTRAVENOUS at 17:55

## 2018-01-01 RX ADMIN — GABAPENTIN 100 MG: 100 CAPSULE ORAL at 13:33

## 2018-01-01 RX ADMIN — HYDROMORPHONE HYDROCHLORIDE 0.5 MG: 1 INJECTION, SOLUTION INTRAMUSCULAR; INTRAVENOUS; SUBCUTANEOUS at 16:32

## 2018-01-01 RX ADMIN — HALOPERIDOL 5 MG: 5 TABLET ORAL at 17:21

## 2018-01-01 RX ADMIN — TAZOBACTAM SODIUM AND PIPERACILLIN SODIUM 3.38 G: 375; 3 INJECTION, SOLUTION INTRAVENOUS at 16:46

## 2018-01-01 RX ADMIN — SODIUM CHLORIDE, POTASSIUM CHLORIDE, SODIUM LACTATE AND CALCIUM CHLORIDE: 600; 310; 30; 20 INJECTION, SOLUTION INTRAVENOUS at 09:37

## 2018-01-01 RX ADMIN — TAZOBACTAM SODIUM AND PIPERACILLIN SODIUM 3.38 G: 375; 3 INJECTION, SOLUTION INTRAVENOUS at 04:15

## 2018-01-01 RX ADMIN — TAZOBACTAM SODIUM AND PIPERACILLIN SODIUM 3.38 G: 375; 3 INJECTION, SOLUTION INTRAVENOUS at 06:53

## 2018-01-01 RX ADMIN — WARFARIN SODIUM 2.5 MG: 2.5 TABLET ORAL at 17:56

## 2018-01-01 RX ADMIN — CLINDAMYCIN HYDROCHLORIDE 300 MG: 300 CAPSULE ORAL at 09:37

## 2018-01-01 RX ADMIN — GABAPENTIN 100 MG: 100 CAPSULE ORAL at 13:22

## 2018-01-01 RX ADMIN — SODIUM CHLORIDE, POTASSIUM CHLORIDE, SODIUM LACTATE AND CALCIUM CHLORIDE: 600; 310; 30; 20 INJECTION, SOLUTION INTRAVENOUS at 21:34

## 2018-01-01 RX ADMIN — Medication 10 MEQ: at 15:25

## 2018-01-01 RX ADMIN — SODIUM CHLORIDE, POTASSIUM CHLORIDE, SODIUM LACTATE AND CALCIUM CHLORIDE: 600; 310; 30; 20 INJECTION, SOLUTION INTRAVENOUS at 09:31

## 2018-01-01 RX ADMIN — HALOPERIDOL 5 MG: 5 TABLET ORAL at 05:12

## 2018-01-01 RX ADMIN — LEVOTHYROXINE SODIUM 125 MCG: 125 TABLET ORAL at 06:33

## 2018-01-01 RX ADMIN — BACLOFEN 10 MG: 10 TABLET ORAL at 14:38

## 2018-01-01 RX ADMIN — GABAPENTIN 100 MG: 100 CAPSULE ORAL at 22:30

## 2018-01-01 RX ADMIN — VANCOMYCIN HYDROCHLORIDE 1500 MG: 1 INJECTION, POWDER, LYOPHILIZED, FOR SOLUTION INTRAVENOUS at 12:46

## 2018-01-01 RX ADMIN — FENTANYL CITRATE 50 MCG: 50 INJECTION, SOLUTION INTRAMUSCULAR; INTRAVENOUS at 10:45

## 2018-01-01 RX ADMIN — TAZOBACTAM SODIUM AND PIPERACILLIN SODIUM 3.38 G: 375; 3 INJECTION, SOLUTION INTRAVENOUS at 21:17

## 2018-01-01 RX ADMIN — HYDROCODONE BITARTRATE AND ACETAMINOPHEN 1 TABLET: 10; 325 TABLET ORAL at 20:25

## 2018-01-01 RX ADMIN — ONDANSETRON 4 MG: 2 INJECTION INTRAMUSCULAR; INTRAVENOUS at 09:50

## 2018-01-01 RX ADMIN — GABAPENTIN 100 MG: 100 CAPSULE ORAL at 20:55

## 2018-01-01 RX ADMIN — BACLOFEN 10 MG: 10 TABLET ORAL at 21:17

## 2018-01-01 RX ADMIN — ASPIRIN 81 MG 81 MG: 81 TABLET ORAL at 09:08

## 2018-01-01 RX ADMIN — VANCOMYCIN HYDROCHLORIDE 1500 MG: 1 INJECTION, POWDER, LYOPHILIZED, FOR SOLUTION INTRAVENOUS at 16:41

## 2018-01-01 RX ADMIN — FENTANYL CITRATE 25 MCG: 50 INJECTION, SOLUTION INTRAMUSCULAR; INTRAVENOUS at 09:41

## 2018-01-01 RX ADMIN — LEVOTHYROXINE SODIUM 125 MCG: 100 TABLET ORAL at 09:08

## 2018-01-01 RX ADMIN — LOSARTAN POTASSIUM 100 MG: 50 TABLET, FILM COATED ORAL at 09:00

## 2018-01-01 RX ADMIN — LOSARTAN POTASSIUM 100 MG: 50 TABLET, FILM COATED ORAL at 14:38

## 2018-01-01 RX ADMIN — VANCOMYCIN HYDROCHLORIDE 1500 MG: 1 INJECTION, POWDER, LYOPHILIZED, FOR SOLUTION INTRAVENOUS at 13:48

## 2018-01-01 RX ADMIN — FLUTICASONE PROPIONATE 2 SPRAY: 50 SPRAY, METERED NASAL at 10:10

## 2018-01-01 RX ADMIN — HYDROCODONE BITARTRATE AND ACETAMINOPHEN 1 TABLET: 10; 325 TABLET ORAL at 07:58

## 2018-01-01 RX ADMIN — Medication 10 MG: at 09:27

## 2018-01-01 RX ADMIN — GABAPENTIN 100 MG: 100 CAPSULE ORAL at 21:17

## 2018-01-01 RX ADMIN — LOSARTAN POTASSIUM 100 MG: 50 TABLET, FILM COATED ORAL at 16:06

## 2018-01-01 RX ADMIN — HALOPERIDOL 5 MG: 5 TABLET ORAL at 00:18

## 2018-01-01 RX ADMIN — TAZOBACTAM SODIUM AND PIPERACILLIN SODIUM 3.38 G: 375; 3 INJECTION, SOLUTION INTRAVENOUS at 00:16

## 2018-01-01 RX ADMIN — NICOTINE 1 PATCH: 14 PATCH, EXTENDED RELEASE TRANSDERMAL at 09:40

## 2018-01-01 RX ADMIN — AMLODIPINE BESYLATE 5 MG: 5 TABLET ORAL at 08:37

## 2018-01-01 RX ADMIN — FLUTICASONE FUROATE AND VILANTEROL TRIFENATATE 1 PUFF: 200; 25 POWDER RESPIRATORY (INHALATION) at 07:54

## 2018-01-01 ASSESSMENT — PAIN DESCRIPTION - DESCRIPTORS
DESCRIPTORS: ACHING
DESCRIPTORS: SORE
DESCRIPTORS: ACHING
DESCRIPTORS: SORE
DESCRIPTORS: ACHING
DESCRIPTORS: ACHING
DESCRIPTORS: SORE
DESCRIPTORS: ACHING
DESCRIPTORS: ACHING
DESCRIPTORS: CONSTANT
DESCRIPTORS: ACHING
DESCRIPTORS: ACHING

## 2018-01-01 ASSESSMENT — ENCOUNTER SYMPTOMS
PSYCHIATRIC NEGATIVE: 1
RESPIRATORY NEGATIVE: 1
CARDIOVASCULAR NEGATIVE: 1
WOUND: 1
NEUROLOGICAL NEGATIVE: 1
CONSTITUTIONAL NEGATIVE: 1

## 2018-01-01 ASSESSMENT — ACTIVITIES OF DAILY LIVING (ADL): WHICH_OF_THE_ABOVE_FUNCTIONAL_RISKS_HAD_A_RECENT_ONSET_OR_CHANGE?: COGNITION

## 2018-01-01 ASSESSMENT — COPD QUESTIONNAIRES: COPD: 1

## 2018-02-12 NOTE — ED NOTES
Straight cath urine, # 14 cath. Urine dark yellow. Assist of one staff person. Tolerated well. Urine to lab.

## 2018-02-12 NOTE — IP AVS SNAPSHOT
[facility]    [address] [place] [id number]    Phone:  [phone]    Fax:  [phone]                                       After Visit Summary   2/12/2018    Seun Emanuel    MRN: [id number]           After Visit Summary Signature Page     I have received my discharge instructions, and my questions have been answered. I have discussed any challenges I see with this plan with the nurse or doctor.    ..........................................................................................................................................  Patient/Patient Representative Signature      ..........................................................................................................................................  Patient Representative Print Name and Relationship to Patient    ..................................................               ................................................  Date                                            Time    ..........................................................................................................................................  Reviewed by Signature/Title    ...................................................              ..............................................  Date                                                            Time

## 2018-02-12 NOTE — ED NOTES
Pt from home with confusion and possible cellulitis.  Pt awake and yelling.  Legs crusted bilaterally

## 2018-02-12 NOTE — ED NOTES
Wife here states pt is on same meds but hasnt had any for 2 days. No fentanyl patch or nicotene patch. Pt has been more confused, doesn't know her.  Also states pt has a cyst on bottom and wife called Dr carr today and he  recommended Augmentin which pt hasnt started yet.

## 2018-02-12 NOTE — ED PROVIDER NOTES
History     Chief Complaint   Patient presents with     confusion     c/o confusion and poor appetite     HPI  Seun Emanuel is a 72 year old male who presents to the ER with increased confusion and poor appetite.  His wife is suspicious that he has increased cellulitis in his legs, they are more red than they have been previously, similar to what they have been when they have been infected.  He also has a cyst on his bottom he is supposed to be on Augmentin for, has not started it yet.  He is moaning, not communicating anything verbally.  His fentanyl patch is not on at present, because of his increased confusion his wife removed it.  Patient is a DNR / DNI, confirmed by wife.    Problem List:    Patient Active Problem List    Diagnosis Date Noted     Hypokalemia 12/17/2017     Priority: Medium     Factor V Leiden mutation (H) 12/17/2017     Priority: Medium     Hypoalbuminemia 12/17/2017     Priority: Medium     Benign essential hypertension 12/16/2017     Priority: Medium     Acute on chronic renal failure (H) 10/11/2016     Priority: Medium     Cellulitis of both lower extremities 07/23/2016     Priority: Medium     ACP (advance care planning) 05/07/2016     Priority: Medium     Advance Care Planning 5/7/2016: Receipt of ACP document:  Received: Health Care Directive which was witnessed or notarized on 4/18/16.  Document not previously scanned.  Validation form completed and sent with document to be scanned.  Code Status reflects choices in most recent ACP document. Recommend goals of care discussion with patient/decision makers and POLST/ updated Code status order as applicable to reflect patient choices.  Confirmed/documented designated decision maker(s).  Added by Chanel Archibald             Cellulitis 02/12/2016     Priority: Medium     Sepsis (H) 01/28/2016     Priority: Medium     Delirium due to medical condition without behavioral disturbance 12/09/2015     Priority: Medium     Delirium 12/01/2015      Priority: Medium     Cellulitis of lower extremity 01/26/2015     Priority: Medium     Renal insufficiency 01/05/2015     Priority: Medium     MRSA infection 11/14/2014     Priority: Medium     Methicillin resistant Staphylococcus aureus infection 11/14/2014     Priority: Medium     Anemia 07/13/2014     Priority: Medium     CKD (chronic kidney disease) stage 3, GFR 30-59 ml/min 07/13/2014     Priority: Medium     Hemiparesis due to old cerebrovascular accident (H) 06/01/2014     Priority: Medium     Noncompliance with medication regimen 06/01/2014     Priority: Medium     Bilateral lower leg cellulitis      Priority: Medium     Renal failure 02/09/2014     Priority: Medium     Hyperkalemia 02/09/2014     Priority: Medium     Tremor 02/09/2014     Priority: Medium     Protracted URI 10/27/2013     Priority: Medium        Past Medical History:    Past Medical History:   Diagnosis Date     Asthma      Bilateral lower leg cellulitis      Chronic venous insufficiency      CKD (chronic kidney disease), stage III      COPD (chronic obstructive pulmonary disease) (H)      CVA (cerebral vascular accident) (H)      DVT (deep venous thrombosis) (H)      Gout      Hypertension      Hypothyroidism      MRSA (methicillin resistant Staphylococcus aureus) infection      Osteoarthritis      Peripheral vascular disease (H)      Ulcers of both lower legs (H)        Past Surgical History:    Past Surgical History:   Procedure Laterality Date     AMPUTATION Left 03/07/2012    left great toe     CHOLECYSTECTOMY  1994     HIP SURGERY Left 01/28/2015     IRRIGATION AND DEBRIDEMENT LOWER EXTREMITY, COMBINED  2010    Both legs     TONSILLECTOMY         Family History:    Family History   Problem Relation Age of Onset     HEART DISEASE Father      CHF     Genitourinary Problems Father      Renal failure     HEART DISEASE Mother      CAD     Hypertension Father      CANCER Mother        Social History:  Marital Status:   [2]  Social  "History   Substance Use Topics     Smoking status: Current Every Day Smoker     Packs/day: 1.00     Years: 20.00     Smokeless tobacco: Never Used      Comment: Recently restarted     Alcohol use No        Medications:      DIPHENHYDRAMINE HCL PO   haloperidol (HALDOL) 2 MG/ML (HIGH CONC) solution   triamcinolone (KENALOG) 0.1 % ointment   UNABLE TO FIND   multivitamin, therapeutic with minerals (MULTI-VITAMIN) TABS tablet   amLODIPine (NORVASC) 10 MG tablet   RISPERIDONE PO   LOSARTAN POTASSIUM PO   OXYCODONE HCL PO   OXYCODONE HCL PO   warfarin (COUMADIN) 2.5 MG tablet   diphenhydrAMINE-zinc acetate (BENADRYL) cream   lidocaine (XYLOCAINE) 2 % jelly   order for DME   HYDROcodone-acetaminophen (NORCO)  MG per tablet   ZOLPIDEM TARTRATE PO   ASPIRIN PO   ACETAMINOPHEN PO   albuterol (2.5 MG/3ML) 0.083% nebulizer solution   DOCUSATE SODIUM PO   HYDROXYZINE HCL PO   lisinopril (PRINIVIL,ZESTRIL) 10 MG tablet   fluticasone (FLONASE) 50 MCG/ACT nasal spray   ORDER FOR DME   ORDER FOR DME   Levothyroxine Sodium (SYNTHROID PO)   COLCHICINE PO   ORDER FOR DME   METOPROLOL SUCCINATE ER PO   TORSEMIDE PO   BACLOFEN PO   Gabapentin (NEURONTIN PO)   ALBUTEROL INHALER 17GM         Review of Systems   Unable to perform ROS: Dementia       Physical Exam   BP: (!) 193/106  Heart Rate: 89  Temp: 98.2  F (36.8  C)  Resp: 20  Height: 180.3 cm (5' 11\")  Weight: 93 kg (205 lb)  SpO2: 97 %      Physical Exam   Constitutional: He appears well-developed and well-nourished. He appears listless. No distress.   HENT:   Head: Normocephalic and atraumatic.   Neck:   Neck contracted in flexed position.   Cardiovascular: Normal rate, regular rhythm and normal heart sounds.    No murmur heard.  Blood pressure elevated, no meds x2 days.   Pulmonary/Chest: Effort normal and breath sounds normal. No respiratory distress. He has no wheezes. He has no rales.   Abdominal: Soft. Bowel sounds are normal. He exhibits no distension. There is no " tenderness.   Musculoskeletal: Normal range of motion. He exhibits edema and tenderness.        Right lower leg: He exhibits edema.        Left lower leg: He exhibits edema.   Both calves cellulitic, thick cracked skin.   Neurological: He appears listless. He is disoriented. GCS eye subscore is 3. GCS verbal subscore is 4. GCS motor subscore is 5.   GCS 12   Skin: Skin is warm and dry. There is erythema.   Psychiatric: His mood appears anxious. Cognition and memory are impaired. He is noncommunicative.   Yells when noxious stimuli occur, otherwise nonverbal.   Nursing note and vitals reviewed.      ED Course     ED Course     Procedures    Labs Ordered and Resulted from Time of ED Arrival Up to the Time of Departure from the ED   ROUTINE UA WITH MICROSCOPIC - Abnormal; Notable for the following:        Result Value    Glucose Urine 30 (*)     Ketones Urine 10 (*)     Blood Urine Small (*)     Protein Albumin Urine >600 (*)     RBC Urine 6 (*)     Bacteria Urine Few (*)     Mucous Urine Present (*)     Hyaline Casts 1 (*)     All other components within normal limits   CBC WITH PLATELETS DIFFERENTIAL - Abnormal; Notable for the following:     WBC 11.4 (*)     RDW 17.5 (*)     Absolute Neutrophil 8.6 (*)     All other components within normal limits   COMPREHENSIVE METABOLIC PANEL - Abnormal; Notable for the following:     Sodium 145 (*)     Glucose 111 (*)     All other components within normal limits   LACTIC ACID - Abnormal; Notable for the following:     Lactic Acid 2.5 (*)     All other components within normal limits   LACTIC ACID   LACTIC ACID   PULSE OXIMETRY NURSING   MEASURE URINE OUTPUT   PATIENT CARE ORDER   CARDIAC CONTINUOUS MONITORING   URINE CULTURE AEROBIC BACTERIAL   BLOOD CULTURE   BLOOD CULTURE       Assessments & Plan (with Medical Decision Making)   Patient again has cellulitis in his lower extremities with sepsis.  Does not meet criteria for severe sepsis, but does have + SIRS criteria and  elevated lactic acid.  Blood pressure elevation due to lack of medication ×2 days, patient is not taking medication orally for his wife or caregivers.  No evidence of new stroke, patient remains on warfarin.  Confusion due to cellulitis/sepsis.  Patient is a DNR/DNI, this was confirmed with his wife.  Spoke with Dr. Olmstead, he will admit the patient to medicine for further evaluation and treatment.  Started on Zosyn and vancomycin in the ER.  He also received 20 mg of labetalol IV for blood pressure control.  Sepsis fluids not completed due to high blood pressure, given a total of 1 L prior to repeat of lactic acid.  Urinalysis clear, comprehensive metabolic panel shows only an elevated sodium, is otherwise unremarkable.  Single set of blood cultures drawn, unable to get second set due to issues with access.    I have reviewed the nursing notes.    I have reviewed the findings, diagnosis, plan and need for follow up with the patient.  New Prescriptions    No medications on file       Final diagnoses:   Cellulitis of lower extremity, unspecified laterality - bilateral   Pilonidal cyst   Disorientation   Bacterial sepsis (H)       2/12/2018   [facility]     Dora Arce MD  02/12/18 [id number]

## 2018-02-12 NOTE — PHARMACY-VANCOMYCIN DOSING SERVICE
Pharmacy Vancomycin Initial Note  Date of Service 2018  Patient's  [id number]  72 year old, male    Indication: Skin and Soft Tissue Infection    Current estimated CrCl = Estimated Creatinine Clearance: 66.1 mL/min (based on Cr of 1.16).    Creatinine for last 3 days  2018:  4:52 PM Creatinine 1.16 mg/dL    Recent Vancomycin Level(s) for last 3 days  No results found for requested labs within last 72 hours.      Vancomycin IV Administrations (past 72 hours)      No vancomycin orders with administrations in past 72 hours.                Nephrotoxins and other renal medications (Future)    Start     Dose/Rate Route Frequency Ordered Stop    18 1751  vancomycin (VANCOCIN) 2,000 mg in D5W 500 mL intermittent infusion      2,000 mg  285 mL/hr over 2 Hours Intravenous EVERY 18 HOURS 18 1750      18 1733  piperacillin-tazobactam (ZOSYN) intermittent infusion 4.5 g      4.5 g  200 mL/hr over 30 Minutes Intravenous ONCE 18 1732            Contrast Orders - past 72 hours     None                Plan:  1.  Start vancomycin  2000 mg IV q18h.   2.  Goal Trough Level: 15-20 mg/L   3.  Pharmacy will check trough levels as appropriate in 1-3 Days.    4. Serum creatinine levels will be ordered daily for the first week of therapy and at least twice weekly for subsequent weeks.    5. Fletcher method utilized to dose vancomycin therapy: Method 1 and Global [facility]    Jace Holloway [facility]

## 2018-02-12 NOTE — IP AVS SNAPSHOT
MRN:[id number]                      After Visit Summary   2/12/2018    Seun Emanuel    MRN: [id number]           Thank you!     Thank you for choosing [facility] for your care. Our goal is always to provide you with excellent care. Hearing back from our patients is one way we can continue to improve our services. Please take a few minutes to complete the written survey that you may receive in the mail after you visit with us. Thank you!        Patient Information     Date Of Birth          1945        Designated Caregiver       Most Recent Value    Caregiver    Will someone help with your care after discharge? yes    Name of designated caregiver wife, home care    Phone number of caregiver see facesheet    Caregiver address see facesheet      About your hospital stay     You were admitted on:  February 12, 2018 You last received care in the:  [facility]    You were discharged on:  February 16, 2018       Who to Call     For medical emergencies, please call 911.  For non-urgent questions about your medical care, please call your primary care provider or clinic, [phone]          Attending Provider     Provider Specialty    Dora Arce MD Emergency Medicine    AyshaErin betancourt MD Internal Medicine    Arpit Ponce MD Internal Medicine       Primary Care Provider Office Phone # Fax #    Herbie Peter [phone]      After Care Instructions     Diet       Follow this diet upon discharge: Orders Placed This Encounter      Regular Diet Adult                  Follow-up Appointments     Follow-up and recommended labs and tests        Follow up with primary care provider, Herbie Peter, within 7-10 days for hospital follow- up.  The following labs/tests are recommended: chem 8.                  Warfarin Instruction     You have started taking a medicine called warfarin. This is a blood-thinning medicine (anticoagulant). It helps  "prevent and treat blood clots.      Before leaving the hospital, make sure you know how much to take and how long to take it.      You will need regular blood tests to make sure your blood is clotting safely. It is very important to see your doctor for regular blood tests.    Talk to your doctor before taking any new medicine (this includes over-the-counter drugs and herbal products). Many medicines can interact with warfarin. This may cause more bleeding or too much clotting.     Eating a lot of vitamin K--found in green, leafy vegetables--can change the way warfarin works in your body. Do NOT avoid these foods. Instead, try to eat the same amount each day.     Bleeding is the most common side-effect of warfarin. You may notice bleeding gums, a bloody nose, bruises and bleeding longer when you cut yourself. See a doctor at once if:   o You cough up blood  o You find blood in your stool (poop)  o You have a deep cut, or a cut that bleeds longer than 10 minutes   o You have a bad cut, hard fall, accident or hit your head (go to urgent care or the emergency room).    For women who can get pregnant: This medicine can harm an unborn baby. Be very careful not to get pregnant while taking this medicine. If you think you might be pregnant, call your doctor right away.    For more information, read \"Guide to Warfarin Therapy,  the booklet you received in the hospital.        Pending Results     Date and Time Order Name Status Description    2/12/2018 1737 Blood culture Preliminary             Statement of Approval     Ordered          02/16/18 1228  I have reviewed and agree with all the recommendations and orders detailed in this document.  EFFECTIVE NOW     Approved and electronically signed by:  Arpit Ponce MD           02/16/18 1153  I have reviewed and agree with all the recommendations and orders detailed in this document.  EFFECTIVE NOW     Approved and electronically signed by:  Arpit Ponce MD           " "  Admission Information     Date & Time Provider Department Dept. Phone    2018 Arpit Ponce MD [facility] [phone]      Your Vitals Were     Blood Pressure Temperature Respirations Height Weight Pulse Oximetry    178/74 (BP Location: Left arm) 99.3  F (37.4  C) (Temporal) 18 1.803 m (5' 11\") 79.6 kg (175 lb 7.8 oz) 95%    BMI (Body Mass Index)                   24.48 kg/m2           [employer] Information     [employer] lets you send messages to your doctor, view your test results, renew your prescriptions, schedule appointments and more. To sign up, go to [email or web address] . Click on \"Log in\" on the left side of the screen, which will take you to the Welcome page. Then click on \"Sign up Now\" on the right side of the page.     You will be asked to enter the access code listed below, as well as some personal information. Please follow the directions to create your username and password.     Your access code is: [email or web address]  Expires: 3/19/2018  1:16 PM     Your access code will  in 90 days. If you need help or a new code, please call your [facility] clinic or [phone].        Care EveryWhere ID     This is your Care EveryWhere ID. This could be used by other organizations to access your [facility] medical records  [id number]        Equal Access to Services     [facility] HospitalJESSICA : Hadii jason raines Socorrine, waaxda luqadaha, qaybta kaalmada dalton, michelle blue . So [facility] [phone].    ATENCIÓN: Si habla español, tiene a martinez disposición servicios gratuitos de asistencia lingüística. Aidan al [phone].    We comply with applicable federal civil rights laws and [place] laws. We do not discriminate on the basis of race, color, national origin, age, disability, sex, sexual orientation, or gender identity.               Review of your medicines      START taking        Dose / Directions    clindamycin 300 MG capsule   Commonly known as:  CLEOCIN "   Indication:  Infection of the Skin and/or Related Soft Tissue   Used for:  Cellulitis of lower extremity, unspecified laterality        Dose:  300 mg   Take 1 capsule (300 mg) by mouth every 8 hours for 5 days   Quantity:  15 capsule   Refills:  0       Potassium Chloride ER 20 MEQ Tbcr   Used for:  Hypokalemia        Dose:  20 mEq   Take 1 tablet (20 mEq) by mouth daily   Quantity:  30 tablet   Refills:  1         CONTINUE these medicines which may have CHANGED, or have new prescriptions. If we are uncertain of the size of tablets/capsules you have at home, strength may be listed as something that might have changed.        Dose / Directions    COUMADIN 2.5 MG tablet   This may have changed:    - how much to take  - how to take this  - when to take this  - additional instructions   Used for:  Confusion associated with infection   Generic drug:  warfarin        2.5 mg daily   Quantity:  30 tablet   Refills:  1         CONTINUE these medicines which have NOT CHANGED        Dose / Directions    ACETAMINOPHEN PO        Dose:  500 mg   Take 500 mg by mouth every 6 hours as needed for pain   Refills:  0       * ALBUTEROL INHALER 17GM        Dose:  2 puff   Inhale 2 puffs into the lungs every 4 hours as needed This product no longer available   Refills:  0       * albuterol (2.5 MG/3ML) 0.083% neb solution        Dose:  1 vial   Take 1 vial by nebulization every 2 hours as needed for shortness of breath / dyspnea or wheezing   Refills:  0       amLODIPine 10 MG tablet   Commonly known as:  NORVASC   Used for:  Benign essential hypertension        Dose:  5 mg   Take 0.5 tablets (5 mg) by mouth daily   Quantity:  30 tablet   Refills:  1       ASPIRIN PO        Dose:  81 mg   Take 81 mg by mouth daily   Refills:  0       BACLOFEN PO        Dose:  10 mg   Take 10 mg by mouth 3 times daily   Refills:  0       COLCHICINE PO        Dose:  0.6 mg   Take 0.6 mg by mouth daily as needed   Refills:  0       DIPHENHYDRAMINE HCL PO         Dose:  25 mg   Take 25 mg by mouth One to two tabs prn itching   Refills:  0       diphenhydrAMINE-zinc acetate cream   Commonly known as:  BENADRYL        Dose:  2 g   Apply 2 g topically 3 times daily as needed for itching (for itching legs)   Refills:  0       DOCUSATE SODIUM PO   Indication:  1-2 Tabs        Dose:  100 mg   Take 100 mg by mouth 2 times daily   Refills:  0       fluticasone 50 MCG/ACT spray   Commonly known as:  FLONASE        Dose:  1-2 spray   Spray 1-2 sprays into both nostrils daily   Quantity:  1 Package   Refills:  0       haloperidol 5 MG tablet   Commonly known as:  HALDOL        Dose:  5 mg   Take 5 mg by mouth every 8 hours as needed for agitation   Refills:  0       HYDROcodone-acetaminophen  MG per tablet   Commonly known as:  NORCO   Used for:  Cellulitis and abscess of leg        Dose:  1 tablet   Take 1 tablet by mouth every 6 hours as needed for moderate to severe pain   Quantity:  60 tablet   Refills:  0       HYDROXYZINE HCL PO        Dose:  50 mg   Take 50 mg by mouth 3 times daily as needed for itching   Refills:  0       lidocaine 2 % topical gel   Commonly known as:  XYLOCAINE        Dose:  1 Tube   Apply 1 Tube topically as needed for moderate pain (apply to itching legs 3-4 times daily)   Refills:  0       lisinopril 10 MG tablet   Commonly known as:  PRINIVIL/ZESTRIL        Dose:  10 mg   Take 1 tablet (10 mg) by mouth daily   Quantity:  30 tablet   Refills:  0       LOSARTAN POTASSIUM PO        Dose:  100 mg   Take 100 mg by mouth daily   Refills:  0       METOPROLOL SUCCINATE ER PO        Dose:  100 mg   Take 100 mg by mouth daily   Refills:  0       Multi-vitamin Tabs tablet        Dose:  1 tablet   Take 1 tablet by mouth daily   Refills:  0       NEURONTIN PO        Dose:  100 mg   Take 100 mg by mouth 3 times daily   Refills:  0       * order for DME   Used for:  COPD exacerbation (H)        Equipment being ordered: Other: *Neb machine** Treatment  Diagnosis: *COPD**   Quantity:  1 Device   Refills:  0       * order for DME   Used for:  Cellulitis, Ulcers of both lower legs, with unspecified severity        Equipment being ordered: Bed Cradle   Quantity:  1 Device   Refills:  0       * order for DME   Used for:  Ulcers of both lower legs, limited to breakdown of skin (H)        Equipment being ordered: Other: **Rook boots for chronic stasis dermatitis and ulcers.* Treatment Diagnosis: *CKD3 , chronic edema with ulcers**   Quantity:  1 Package   Refills:  2       * order for DME   Used for:  Hemiparesis due to old cerebrovascular accident (H), Arthritis of left shoulder region        Equipment being ordered: Other: rollo cushion and arm sling Treatment Diagnosis: stage 2 pressure sores and left should pain and OA of ac joint   Quantity:  1 Device   Refills:  0       OXYCODONE HCL PO   Indication:  10-15 mg every 8 hours as needed        Dose:  15 mg   Take 15 mg by mouth every 8 hours as needed   Refills:  0       RISPERIDONE PO        Dose:  0.25 mg   Take 0.25 mg by mouth   Refills:  0       SYNTHROID PO        Dose:  125 mcg   Take 125 mcg by mouth daily   Refills:  0       TORSEMIDE PO        Dose:  20 mg   Take 20 mg by mouth daily   Refills:  0       triamcinolone 0.1 % ointment   Commonly known as:  KENALOG        Apply topically 2 times daily as needed for irritation   Refills:  0       UNABLE TO FIND        MEDICATION NAME: Phenylephrine-DM-acetaminophen Bid PRN   Refills:  0       ZOLPIDEM TARTRATE PO        Dose:  10 mg   Take 10 mg by mouth nightly as needed   Refills:  0       * Notice:  This list has 6 medication(s) that are the same as other medications prescribed for you. Read the directions carefully, and ask your doctor or other care provider to review them with you.         Where to get your medicines      These medications were sent to [id number] - [place] - [address]  [address] [place] [address]     Phone:   [phone]     clindamycin 300 MG capsule    Potassium Chloride ER 20 MEQ [facility]                Protect others around you: Learn how to safely use, store and throw away your medicines at www.disposemymeds.org.        ANTIBIOTIC INSTRUCTION     You've Been Prescribed an Antibiotic - Now What?  Your healthcare team thinks that you or your loved one might have an infection. Some infections can be treated with antibiotics, which are powerful, life-saving drugs. Like all medications, antibiotics have side effects and should only be used when necessary. There are some important things you should know about your antibiotic treatment.      Your healthcare team may run tests before you start taking an antibiotic.    Your team may take samples (e.g., from your blood, urine or other areas) to run tests to look for bacteria. These test can be important to determine if you need an antibiotic at all and, if you do, which antibiotic will work best.      Within a few days, your healthcare team might change or even stop your antibiotic.    Your team may start you on an antibiotic while they are working to find out what is making you sick.    Your team might change your antibiotic because test results show that a different antibiotic would be better to treat your infection.    In some cases, once your team has more information, they learn that you do not need an antibiotic at all. They may find out that you don't have an infection, or that the antibiotic you're taking won't work against your infection. For example, an infection caused by a virus can't be treated with antibiotics. Staying on an antibiotic when you don't need it is more likely to be harmful than helpful.      You may experience side effects from your antibiotic.    Like all medications, antibiotics have side effects. Some of these can be serious.    Let you healthcare team know if you have any known allergies when you are admitted to the hospital.    One significant  side effect of nearly all antibiotics is the risk of severe and sometimes deadly diarrhea caused by Clostridium difficile (C. Difficile). This occurs when a person takes antibiotics because some good germs are destroyed. Antibiotic use allows C. diificile to take over, putting patients at high risk for this serious infection.    As a patient or caregiver, it is important to understand your or your loved one's antibiotic treatment. It is especially important for caregivers to speak up when patients can't speak for themselves. Here are some important questions to ask your healthcare team.    What infection is this antibiotic treating and how do you know I have that infection?    What side effects might occur from this antibiotic?    How long will I need to take this antibiotic?    Is it safe to take this antibiotic with other medications or supplements (e.g., vitamins) that I am taking?     Are there any special directions I need to know about taking this antibiotic? For example, should I take it with food?    How will I be monitored to know whether my infection is responding to the antibiotic?    What tests may help to make sure the right antibiotic is prescribed for me?      Information provided by:  www.cdc.gov/getsmart  U.S. Department of Health and Human Services  Centers for disease Control and Prevention  National Center for Emerging and Zoonotic Infectious Diseases  Division of Healthcare Quality Promotion             Medication List: This is a list of all your medications and when to take them. Check marks below indicate your daily home schedule. Keep this list as a reference.      Medications           Morning Afternoon Evening Bedtime As Needed    ACETAMINOPHEN PO   Take 500 mg by mouth every 6 hours as needed for pain   Last time this was given:  650 mg on 2/14/2018  7:34 PM                                * ALBUTEROL INHALER 17GM   Inhale 2 puffs into the lungs every 4 hours as needed This product no  longer available                                * albuterol (2.5 MG/3ML) 0.083% neb solution   Take 1 vial by nebulization every 2 hours as needed for shortness of breath / dyspnea or wheezing                                amLODIPine 10 MG tablet   Commonly known as:  NORVASC   Take 0.5 tablets (5 mg) by mouth daily   Last time this was given:  5 mg on 2/16/2018  9:40 AM                                ASPIRIN PO   Take 81 mg by mouth daily   Last time this was given:  81 mg on 2/16/2018  9:40 AM                                BACLOFEN PO   Take 10 mg by mouth 3 times daily   Last time this was given:  10 mg on 2/16/2018  9:41 AM                                clindamycin 300 MG capsule   Commonly known as:  CLEOCIN   Take 1 capsule (300 mg) by mouth every 8 hours for 5 days   Last time this was given:  300 mg on 2/16/2018 10:21 AM                                COLCHICINE PO   Take 0.6 mg by mouth daily as needed                                COUMADIN 2.5 MG tablet   2.5 mg daily   Last time this was given:  2.5 mg on 2/15/2018  5:56 PM   Generic drug:  warfarin                                DIPHENHYDRAMINE HCL PO   Take 25 mg by mouth One to two tabs prn itching                                diphenhydrAMINE-zinc acetate cream   Commonly known as:  BENADRYL   Apply 2 g topically 3 times daily as needed for itching (for itching legs)                                DOCUSATE SODIUM PO   Take 100 mg by mouth 2 times daily   Last time this was given:  100 mg on 2/15/2018  9:52 PM                                fluticasone 50 MCG/ACT spray   Commonly known as:  FLONASE   Spray 1-2 sprays into both nostrils daily   Last time this was given:  2 sprays on 2/16/2018 10:27 AM                                haloperidol 5 MG tablet   Commonly known as:  HALDOL   Take 5 mg by mouth every 8 hours as needed for agitation                                HYDROcodone-acetaminophen  MG per tablet   Commonly known as:  NORCO    Take 1 tablet by mouth every 6 hours as needed for moderate to severe pain                                HYDROXYZINE HCL PO   Take 50 mg by mouth 3 times daily as needed for itching                                lidocaine 2 % topical gel   Commonly known as:  XYLOCAINE   Apply 1 Tube topically as needed for moderate pain (apply to itching legs 3-4 times daily)   Last time this was given:  10 mLs on 2/12/2018  5:37 PM                                lisinopril 10 MG tablet   Commonly known as:  PRINIVIL/ZESTRIL   Take 1 tablet (10 mg) by mouth daily                                LOSARTAN POTASSIUM PO   Take 100 mg by mouth daily   Last time this was given:  100 mg on 2/16/2018  9:40 AM                                METOPROLOL SUCCINATE ER PO   Take 100 mg by mouth daily   Last time this was given:  100 mg on 2/16/2018  9:40 AM                                Multi-vitamin Tabs tablet   Take 1 tablet by mouth daily                                NEURONTIN PO   Take 100 mg by mouth 3 times daily   Last time this was given:  100 mg on 2/16/2018  9:40 AM                                * order for DME   Equipment being ordered: Other: *Neb machine** Treatment Diagnosis: *COPD**                                * order for DME   Equipment being ordered: Bed Cradle                                * order for DME   Equipment being ordered: Other: **Rook boots for chronic stasis dermatitis and ulcers.* Treatment Diagnosis: *CKD3 , chronic edema with ulcers**                                * order for DME   Equipment being ordered: Other: rollo cushion and arm sling Treatment Diagnosis: stage 2 pressure sores and left should pain and OA of ac joint                                OXYCODONE HCL PO   Take 15 mg by mouth every 8 hours as needed   Last time this was given:  15 mg on 2/15/2018  5:56 PM                                Potassium Chloride ER 20 MEQ Tbcr   Take 1 tablet (20 mEq) by mouth daily   Last time this was  given:  40 mEq on 2/16/2018  9:41 AM                                RISPERIDONE PO   Take 0.25 mg by mouth   Last time this was given:  0.25 mg on 2/15/2018  9:52 PM                                SYNTHROID PO   Take 125 mcg by mouth daily   Last time this was given:  125 mcg on 2/16/2018  9:41 AM                                TORSEMIDE PO   Take 20 mg by mouth daily                                triamcinolone 0.1 % ointment   Commonly known as:  KENALOG   Apply topically 2 times daily as needed for irritation                                UNABLE TO FIND   MEDICATION NAME: Phenylephrine-DM-acetaminophen Bid PRN   Last time this was given:  2/16/2018  9:33 AM                                ZOLPIDEM TARTRATE PO   Take 10 mg by mouth nightly as needed                                * Notice:  This list has 6 medication(s) that are the same as other medications prescribed for you. Read the directions carefully, and ask your doctor or other care provider to review them with you.              More Information        Preventing the Spread of Infection: Understanding Isolation Procedures  Certain infections can spread from person to person. This is why your friend or family member may be put in a special room. Restrictions may be placed on who can go in and out of that room and what protection must be worn. This is for your protection and the patient s protection. Read this sheet to learn more.  How infection spreads  Infection is caused by germs. An infected person carries germs that he or she can give to others. Even a person who doesn t feel sick can still carry and spread germs. Germs can cause infection by traveling through the air, through direct contact or on the surface of objects such as a door handle, bed railing, or tabletop. The rules about when and who can visit your friend or family member depend on what kind of infection he or she has.  Precautions help prevent the spread of infection  To stop infection  from spreading, healthcare workers may do 1 or more of the following:    Place an infected patient in a private room, or in a room with others who have exactly the same infection. (This depends on what kind of infection the person has.)    Place restrictions on who can enter and exit this room.    Wear a mask and eye protection or a face shield, gloves, gown, or other items, and ask you to do the same when you visit.    Wear an air filter (respirator) for some infections, and ask you to do the same when you visit.  What you can do    You may be asked to wear a mask and eye protection or a face shield, gloves, gown, or other items when you visit. Follow any instructions carefully.    Practice good hand hygiene, especially after using the bathroom and before and after touching the person or his or her surroundings. Good hand hygiene means washing your hands with soap and water or using alcohol-based gels or foams.    Keep your hands away from your face.    Cough or sneeze into a tissue. Then throw the tissue away and wash your hands. If you don't have a tissue, cough or sneeze into your elbow.    Do not use the person s bathroom.    Do not visit someone if you feel sick. Do not visit if you have been exposed to an illness such as the flu, chickenpox, or measles.     Date Last Reviewed: 12/1/2016 2000-2017 [employer]. [address] [place] [address]. All rights reserved. This information is not intended as a substitute for professional medical care. Always follow your healthcare professional's instructions.                Cellulitis  Cellulitis is an infection of the deep layers of skin. A break in the skin, such as a cut or scratch, can let bacteria under the skin. If the bacteria get to deep layers of the skin, it can be serious. If not treated, cellulitis can get into the bloodstream and lymph nodes. The infection can then spread throughout the body. This causes serious  illness.  Cellulitis causes the affected skin to become red, swollen, warm, and sore. The reddened areas have a visible border. An open sore may leak fluid (pus). You may have a fever, chills, and pain.  Cellulitis is treated with antibiotics taken for 7 to 10 days. An open sore may be cleaned and covered with cool wet gauze. Symptoms should get better 1 to 2 days after treatment is started. Make sure to take all the antibiotics for the full number of days until they are gone. Keep taking the medicine even if your symptoms go away.  Home care  Follow these tips:    Limit the use of the part of your body with cellulitis.     If the infection is on your leg, keep your leg raised while sitting. This will help to reduce swelling.    Take all of the antibiotic medicine exactly as directed until it is gone. Do not miss any doses, especially during the first 7 days. Don t stop taking the medicine when your symptoms get better.    Keep the affected area clean and dry.    Wash your hands with soap and warm water before and after touching your skin. Anyone else who touches your skin should also wash his or her hands. Don't share towels.  Follow-up care  Follow up with your healthcare provider, or as advised. If your infection does not go away on the first antibiotic, your healthcare provider will prescribe a different one.  When to seek medical advice  Call your healthcare provider right away if any of these occur:    Red areas that spread    Swelling or pain that gets worse    Fluid leaking from the skin (pus)    Fever higher of 100.4  F (38.0  C) or higher after 2 days on antibiotics  Date Last Reviewed: 9/1/2016 2000-2017 [employer]. [address] [place] [address], [place] [address]. All rights reserved. This information is not intended as a substitute for professional medical care. Always follow your healthcare professional's instructions.

## 2018-02-12 NOTE — ED NOTES
DATE:  2/12/2018   TIME OF RECEIPT FROM LAB:  2003  LAB TEST:  Lactic acid  LAB VALUE:  2.5  RESULTS GIVEN WITH READ-BACK TO (PROVIDER):  Dr Penaloza  TIME LAB VALUE REPORTED TO PROVIDER:   [id number]

## 2018-02-13 NOTE — PROGRESS NOTES
We did receive some update on his usual status from the home care nurses.  Normally patient is more alert.  He is able to converse.  Apparently does feed himself also.  When they visited him yesterday and found him in his current status that was a marketed decline in his usual baseline.  He is not using the fentanyl patch any longer.  They use just the oxycodone 3 times a day for his current pain control.

## 2018-02-13 NOTE — PHARMACY-ANTICOAGULATION SERVICE
INR = 2.4 this morning. The patient was not alert enough to swallow his evening warfarin dose last night so he did not get a dose. Recommend 2.5 mg warfarin today.

## 2018-02-13 NOTE — PHARMACY
[facility]    Pharmacy      Antimicrobial Stewardship Note     Current antimicrobial therapy:  Anti-infectives (Future)    Start     Dose/Rate Route Frequency Ordered Stop    02/13/18 0030  piperacillin-tazobactam (ZOSYN) infusion 3.375 g      3.375 g  100 mL/hr over 30 Minutes Intravenous EVERY 6 HOURS 02/12/18 2033 02/12/18 1751  vancomycin (VANCOCIN) 2,000 mg in D5W 500 mL intermittent infusion      2,000 mg  285 mL/hr over 2 Hours Intravenous EVERY 18 HOURS 02/12/18 1750            Indication: cellulitis  Days of Therapy: 1     Pertinent labs:  Creatinine   Creatinine   Date Value Ref Range Status   02/13/2018 1.09 0.66 - 1.25 mg/dL Final   02/12/2018 1.16 0.66 - 1.25 mg/dL Final   12/19/2017 0.94 0.66 - 1.25 mg/dL Final     WBC   WBC   Date Value Ref Range Status   02/13/2018 8.9 4.0 - 11.0 10e9/L Final   02/12/2018 11.4 (H) 4.0 - 11.0 10e9/L Final   12/18/2017 10.7 4.0 - 11.0 10e9/L Final     Procalcitonin No results found for: PCAL  CRP   CRP Inflammation   Date Value Ref Range Status   12/16/2017 20.4 (H) 0.0 - 8.0 mg/L Final   10/12/2016 78.7 (H) 0.0 - 8.0 mg/L Final   08/02/2016 165.0 (H) 0.0 - 8.0 mg/L Final       Culture Results:       Recommendations/Interventions:  1.  Continue current therapy pending cultures    Nannette Flaherty, PharmD  February 13, 2018

## 2018-02-13 NOTE — H&P
[facility]    History and Physical  Hospitalist       Date of Admission:  2/12/2018  Date of Service (when I saw the patient): 02/12/18    Assessment & Plan     1. B LE cellulitis mostly posterior, punctate ulcers and significant chronic stasis to his LEs. I doubt that he can even walk yet his wife takes care of him. Wound care, Vanco and Zosyn to start with. Follow counts.   2. Dementia with delirium- Haldol prn, fall precautions.   3. Factor V leidin deficiency- Pt hasn't been eating or taking his meds for 2 days, so is subRxic on his INR. Coumadin dosing per Pharmacy.   4. HTN- PRN Hydralazine and oral meds.  5. Chronic pain- Home pain meds but also IV Dilaudid and not taking anything orally.   6. Lethargy/fatigue- NPO except ice chips and meds until he gets more alert.   7. Metabolic encephalopathy from infection.   8. Hypothyroidism- Levothyroxine.   9. JUAN DAVID on CKD- Prerenal. IVFs and follow.   10. Lactic acid- Resolved with fluids. Continue LR infusion.   11. Hemiparesis due to old CVA.  12. COPD- Stable.   13. H/o MRSA- Vanco and contact precautions.   14. PVD- ASA.  15. Chronic ulcers of LEs- Wound care. Bandage.     Active Problems:    Cellulitis    DVT Prophylaxis: Heparin SQ  Code Status: DNR / DNI    Disposition: Expected discharge indeterminate.    Erin Olmstead    Primary Care Physician   Herbie Peter    Chief Complaint    Brought in by wife for confusion and poor appetite.    History is obtained from the electronic health record and emergency department physician    History of Present Illness   Seun Emanuel is a 72 year old male who presents with confusion and poor appetite.  Similar presentation to his LE cellulitis before. The pt has dementia and now has hypoactive delirium so unable to give history. He really isn't able to offer any meaningful history. Upon evaluation in the ED, he was discovered to have a cellulitis of his LEs and with a history of MRSA was started on Vanco  and Zosyn.     Past Medical History    I have reviewed this patient's medical history and updated it with pertinent information if needed.   Past Medical History:   Diagnosis Date     Asthma      Bilateral lower leg cellulitis      Chronic venous insufficiency      CKD (chronic kidney disease), stage III      COPD (chronic obstructive pulmonary disease) (H)      CVA (cerebral vascular accident) (H)      DVT (deep venous thrombosis) (H)      Gout      Hypertension      Hypothyroidism      MRSA (methicillin resistant Staphylococcus aureus) infection      Osteoarthritis      Peripheral vascular disease (H)      Ulcers of both lower legs (H)        Past Surgical History   I have reviewed this patient's surgical history and updated it with pertinent information if needed.  Past Surgical History:   Procedure Laterality Date     AMPUTATION Left 03/07/2012    left great toe     CHOLECYSTECTOMY  1994     HIP SURGERY Left 01/28/2015     IRRIGATION AND DEBRIDEMENT LOWER EXTREMITY, COMBINED  2010    Both legs     TONSILLECTOMY         Prior to Admission Medications   Prior to Admission Medications   Prescriptions Last Dose Informant Patient Reported? Taking?   ACETAMINOPHEN PO Unknown at Unknown time Other Yes No   Sig: Take 500 mg by mouth every 6 hours as needed for pain    ALBUTEROL INHALER 17GM Unknown at Unknown time Other Yes No   Sig: Inhale 2 puffs into the lungs every 4 hours as needed This product no longer available   ASPIRIN PO Unknown at Unknown time Other Yes No   Sig: Take 81 mg by mouth daily   BACLOFEN PO Unknown at Unknown time Other Yes No   Sig: Take 10 mg by mouth 3 times daily    COLCHICINE PO Unknown at Unknown time Other Yes No   Sig: Take 0.6 mg by mouth daily as needed   DIPHENHYDRAMINE HCL PO   Yes Yes   Sig: Take 25 mg by mouth One to two tabs prn itching   DOCUSATE SODIUM PO Unknown at Unknown time Other Yes No   Sig: Take 100 mg by mouth 2 times daily    Gabapentin (NEURONTIN PO) Unknown at Unknown  time Other Yes No   Sig: Take 100 mg by mouth 3 times daily    HYDROXYZINE HCL PO Unknown at Unknown time Other Yes No   Sig: Take 50 mg by mouth 3 times daily as needed for itching   HYDROcodone-acetaminophen (NORCO)  MG per tablet Unknown at Unknown time Other No No   Sig: Take 1 tablet by mouth every 6 hours as needed for moderate to severe pain   LOSARTAN POTASSIUM PO Unknown at Unknown time  Yes No   Sig: Take 100 mg by mouth daily   Levothyroxine Sodium (SYNTHROID PO) Unknown at Unknown time Other Yes No   Sig: Take 125 mcg by mouth daily    METOPROLOL SUCCINATE ER PO Unknown at Unknown time Other Yes No   Sig: Take 100 mg by mouth daily   ORDER FOR DME Unknown at Unknown time Other No No   Sig: Equipment being ordered: Other: *Neb machine**  Treatment Diagnosis: *COPD**   ORDER FOR DME Unknown at Unknown time Other No No   Sig: Equipment being ordered: Bed Cradle   ORDER FOR DME Unknown at Unknown time Other No No   Sig: Equipment being ordered: Other: **Rook boots for chronic stasis dermatitis and ulcers.*  Treatment Diagnosis: *CKD3 , chronic edema with ulcers**   OXYCODONE HCL PO Unknown at Unknown time  Yes No   Sig: Take 15 mg by mouth every 8 hours as needed    RISPERIDONE PO Unknown at Unknown time  Yes No   Sig: Take 0.25 mg by mouth   TORSEMIDE PO Unknown at Unknown time Other Yes No   Sig: Take 20 mg by mouth daily    UNABLE TO FIND Unknown at Unknown time  Yes No   Sig: MEDICATION NAME: Phenylephrine-DM-acetaminophen Bid PRN   ZOLPIDEM TARTRATE PO Unknown at Unknown time Other Yes No   Sig: Take 10 mg by mouth nightly as needed    albuterol (2.5 MG/3ML) 0.083% nebulizer solution Unknown at Unknown time Other Yes No   Sig: Take 1 vial by nebulization every 2 hours as needed for shortness of breath / dyspnea or wheezing   amLODIPine (NORVASC) 10 MG tablet Unknown at Unknown time  No No   Sig: Take 0.5 tablets (5 mg) by mouth daily   diphenhydrAMINE-zinc acetate (BENADRYL) cream Unknown at  Unknown time  Yes No   Sig: Apply 2 g topically 3 times daily as needed for itching (for itching legs)   fluticasone (FLONASE) 50 MCG/ACT nasal spray Unknown at Unknown time Other No No   Sig: Spray 1-2 sprays into both nostrils daily   haloperidol (HALDOL) 5 MG tablet   Yes Yes   Sig: Take 5 mg by mouth every 8 hours as needed for agitation   lidocaine (XYLOCAINE) 2 % jelly Unknown at Unknown time  Yes No   Sig: Apply 1 Tube topically as needed for moderate pain (apply to itching legs 3-4 times daily)   lisinopril (PRINIVIL,ZESTRIL) 10 MG tablet Unknown at Unknown time Other No No   Sig: Take 1 tablet (10 mg) by mouth daily   multivitamin, therapeutic with minerals (MULTI-VITAMIN) TABS tablet Unknown at Unknown time  Yes No   Sig: Take 1 tablet by mouth daily   order for DME Unknown at Unknown time Other No No   Sig: Equipment being ordered: Other: rollo cushion and arm sling  Treatment Diagnosis: stage 2 pressure sores and left should pain and OA of ac joint   triamcinolone (KENALOG) 0.1 % ointment Unknown at Unknown time  Yes No   Sig: Apply topically 2 times daily as needed for irritation   warfarin (COUMADIN) 2.5 MG tablet Unknown at Unknown time  No No   Sig: Take 1 tablet (2.5 mg) by mouth daily Tuesday, Thursday   Patient taking differently: Take 2.5 mg by mouth daily 2.5mg Tuesday, Thursday, Saturday, Sunday , 5mg ROW      Facility-Administered Medications: None     Allergies   Allergies   Allergen Reactions     Bactroban [Mupirocin]      Indapamide      Lasix      Takes torsemide at home  Unknown       Sulfa Drugs      Vomiting, can't breathe         Social History   I have reviewed this patient's social history and updated it with pertinent information if needed. Seun Emanuel  reports that he has been smoking.  He has a 20.00 pack-year smoking history. He has never used smokeless tobacco. He reports that he does not drink alcohol or use illicit drugs.    Family History   I have reviewed this patient's  family history and updated it with pertinent information if needed.   Family History   Problem Relation Age of Onset     HEART DISEASE Father      CHF     Genitourinary Problems Father      Renal failure     HEART DISEASE Mother      CAD     Hypertension Father      CANCER Mother        Review of Systems   Review of systems not obtained due to patient factors - mental status    Physical Exam   Temp: 98.8  F (37.1  C) Temp src: Tympanic BP: 171/81   Heart Rate: 82 Resp: 20 SpO2: 93 % O2 Device: None (Room air)    Vital Signs with Ranges  Temp:  [98.2  F (36.8  C)-98.8  F (37.1  C)] 98.8  F (37.1  C)  Heart Rate:  [81-89] 82  Resp:  [20] 20  BP: (166-207)/() 171/81  SpO2:  [89 %-99 %] 93 %  205 lbs 0 oz    Constitutional: Lethargic  Eyes: Unremarkable.  HEENT: Normal nasal discharge. Dry MM.  Respiratory: Clear  Cardiovascular: S1 + S2.  GI: Soft, NT, ND.  Skin: Chronic stasis changes, erythema of both LEs, chronic superficial ulcerations.   Musculoskeletal: +++ edema LEs.  Neurologic: Limited exam. Old hemiparesis but no obvious new findings.   Psychiatric: Confused and oriented x 0    Data   Data reviewed today:  I personally reviewed all labs.     Recent Labs  Lab 02/12/18  1652   WBC 11.4*   HGB 15.9   MCV 83      *   POTASSIUM 4.2   CHLORIDE 108   CO2 26   BUN 19   CR 1.16   ANIONGAP 11   RASHEED 9.2   *   ALBUMIN 3.4   PROTTOTAL 7.3   BILITOTAL 0.7   ALKPHOS 89   ALT 21   AST 15       Lactic Acid   Date Value Ref Range Status   02/12/2018 1.4 0.4 - 2.0 mmol/L Final   02/12/2018 2.5 (H) 0.4 - 2.0 mmol/L Final     Comment:     Significant value called to and read back by  Mikki Alvarado at 1725 by SG     12/16/2017 1.4 0.4 - 2.0 mmol/L Final       No results found for this or any previous visit (from the past 24 hour(s)).

## 2018-02-13 NOTE — CONSULTS
Pt refused Wound Care consult. Spoke with ROSS Casiano earlier. She stated that Z-Guard was started to his buttocks on a previous shift. At this time I would recommend changing from Z-Guard to Aloe [place] unless the skin to buttocks is wet and weeping.

## 2018-02-13 NOTE — PLAN OF CARE
[facility] Inpatient Admission Note:    Patient admitted to 3218/3218-1 at approximately 1950 via cart accompanied by transport tech from emergency room . Report received from ROSS Villanueva in SBAR format at 1930 via telephone. Patient transferred to bed via slide board.. Patient is alert and oriented X unable to assess, nonverbal, no pain; rates at 0 on 0-10 scale.  Patient oriented to room, unit, hourly rounding, and plan of care. Explained admission packet and patient handbook with patient bill of rights brochure. Will continue to monitor and document as needed.     Inpatient Nursing criteria listed below was met:      Health care directives status obtained and documented: Yes      Care Everywhere authorization obtained No      MRSA swab completed for patient 65 years and older: N/A and Hx MRSA, on contact isolation      Patient identifies a surrogate decision maker: Yes If yes, who: Kimmy, wife Contact Information: see facesheet      Core Measure diagnosis present:No. If yes, state diagnosis: NA       Is initial lactic acid >2.0? No.       Vaccination assessment and education completed: Yes   Vaccinations received prior to admission: Pneumovax yes  Influenza(seasonal)  YES   Vaccination(s) ordered: not given today because up to date      Clergy visit ordered if patient requests: Nonverbal, defering to next shift      Skin issues/needs documented: Yes      Isolation Patient: yes Education given, correct sign in place and documentation row added to PCS:  Yes      Fall Prevention Yes: Care plan updated, education given and documented, sticker and magnet in place: Yes      Care Plan initiated: Yes      Education Documented (including assessment): Yes      Patient has discharge needs : No If yes, please explain: NA

## 2018-02-13 NOTE — PROGRESS NOTES
Assessment completed see flowsheet.    Met with patient and his wife Lakshmi. Lakshmi is patient's caretaker at home. Patient and wife state they want to return home when patient is able to discharge from the hospital. Lakshmi states she has been changing patient's dressings for 4 years.     Others present: Patient and Family - Wife Lakshmi present    Dx: Cellulitis       Lives with: spouse    Living at: house    Equipment used:  Electric wheelchair, hospital bed, bam     Support System:  Involved, Supportive        Primary PCP: Herbie Peter    POA/Guardian: Yes      Health Care Directive: YES      Homecare/County Services:   [facility]       Adequate Resources for needs (housing, utilities, food/med): NO    Meds and appointments management: YES wife     Work: NO    Transportation: YES  patient uses [facility] transportation       ADLs: complete assist by wife     Falls: No    Able to Return to Prior Living Arrangements: tbd     [place]: NO            Goals: return home     Barriers: no barriers identified at this time       [place]: average     Discharge Plan: TBD

## 2018-02-13 NOTE — PLAN OF CARE
Face to face report given with opportunity to observe patient.    Report given to ROSS Johnson.    Jeanne Ingram   2/12/2018  11:46 PM

## 2018-02-13 NOTE — PROGRESS NOTES
[facility]    Hospitalist Progress Note      Assessment & Plan   Seun Emanuel is a 72 year old male who was admitted on 2/12/2018.      1.  Severe chronic bilateral lower extremity chronic venous stasis dermatitis with possible acute cellulitis: This patient does have evidence of severe chronic issues with his lower extremities.  Has been in and out of the hospital multiple times over the years with presumed abscesses/cellulitis.  He has significant hyperkeratotic skin over both of his lower extremities.  There does not appear to be any focal fluctuance or abscesses at this time.  His left leg he does have some erythema which stretches up above his knee count of on the inner thigh about a third of the way up.  Feels mildly indurated mildly erythematous.  Hard to say if it is tender because he moans and groans where you touch him anywhere.  Did have some mild elevation of his white count.  No significant fevers.  I have not spoken to his wife but per ER reports he has not been doing well the last couple of days in terms of being more lethargic not eating very well usually he becomes this way when he has an infection and most commonly it is due to his legs.    He does have a history of MRSA.  Although the last culture I have was from January 2017.  Unfortunately that MRSA culture was resistant to clindamycin.  A superficial culture done in the clinic on 12/28/2017 showed multiple different bugs which included Enterobacter Citrobacter Klebsiella and Pseudomonas.  The utility of these cultures is unknown given how his legs look.  Currently he is getting both vancomycin and Zosyn.  Unclear what we should put him on in terms of an oral antibiotic.  The go to drug has been clindamycin for the last year or so.  In spite of that previous culture showing MRSA resistant to clindamycin he has improved.    The moderate edema he has is less.  His right foot is somewhat wrinkled already.  Not sure what wound care he  has been getting at home at this point will need to discuss this further with his wife.  Will have our wound care service evaluate and give us recommendations as to what to do with his legs.  A picture of his legs has been taken and is in the physical exam portion of this note.  There is no evidence of his ongoing sepsis at this point.      2.  Altered mental status: I not sure what his baseline is in terms of his communication ability.  Currently he will follow some simple commands for me.  Is unable to verbalize at all for me.  He tends to moan and groan upon palpation of any portion of his body at this time.  He is not agitated.  Will need to discuss this further with his wife as to what his current baseline is.    3. 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  His INR is 2.4 today.  Will have pharmacy dose for us.  This will be somewhat difficult given his at least for the time being poor oral intake and use of antibiotics.    4. HTN-has a significant history of this.  Will give him his oral medications if he can take them.  Does have both labetalol and hydralazine ordered for as needed use only.      5. Chronic pain-he appears to be on both a fentanyl patch at home.  I guess his wife took this off prior to arrival.  He also takes oral oxycodone.  Currently he is on IV Dilaudid until he can take oral medications better.      6.  Cardiac status: He was a valid by cardiology year or 2 ago.  Had an echocardiogram which showed normal systolic function EF from 50-55%.  They were unable to assess him any further with a nuclear medicine stress test due to inability to lie down.  From a cardiology standpoint they felt no further evaluation was necessary as he was basically asymptomatic and had no evidence of ischemia or heart failure.    7. Volume depletion: Patient had decreased intake apparently over the last couple of days per his wife.  Does appear to be somewhat on the dry side.  Mucous members  are extremely dry.  BUN/creatinine are up mildly over his baseline.  Has gotten IV fluids.  Will try monitor his I's and O's as he is incontinent so this will be somewhat difficult.  We will try and do daily weights.  He does not appear to be grossly volume overloaded at this point.    8.  COPD: Does carry a history of COPD.  He is oxygenating well at this time on room air.  Has some upper airway rhonchi just due to poor clearing of secretions.  Will monitor him closely.  He does have some nebulizer treatments ordered.  9.  Disposition.  This is my first interaction with Mr. Emanuel.  We will have to discuss issues further with his wife.  Will have case management also involved.  From what I gather he is totally unable to ambulate.  Does have a previous history of a CVA with some left-sided hemiparesis.  Has had a hip fracture repaired in the past also.  He is definitely nonambulatory.  Given the severity of how his legs look inclined to feel that a structured nursing facility would be a more appropriate place for him.  We will have to discuss what resources his wife has at home however.  Would hope that his mental status and interaction does improve.  As currently I do not feel that he would be able to live at home even with significant help.  Will reevaluate daily as to his improvements.     Diet: NPO for Medical/Clinical Reasons Except for: Meds, Ice Chips  Fluids: LR  DVT Prophylaxis: Warfarin  Code Status: DNR/DNI    Disposition: Expected discharge in unknown number of days depending upon his improvement in mental status and infection.    Arpit Ponce    Interval History   Patient does arouse to voice.  Will look at me.  Does follow simple commands.  No matter where I push she does groan and say that it hurts both his legs belly shoulders chest.  He is nonverbal at this point.  Blood pressures remained stable in fact a little on the higher side 170s 180s.  Sats have remained good on room air.    -Data  "reviewed today: I reviewed all new labs and imaging results over the last 24 hours. I personally reviewed no images or EKG's today.    Physical Exam   Temp: 98.9  F (37.2  C) Temp src: Tympanic BP: 179/71   Heart Rate: 76 Resp: 20 SpO2: 95 % O2 Device: None (Room air)    Vitals:    02/12/18 1738 02/12/18 2000   Weight: 93 kg (205 lb) 79.6 kg (175 lb 7.8 oz)     Vital Signs with Ranges  Temp:  [98.2  F (36.8  C)-98.9  F (37.2  C)] 98.9  F (37.2  C)  Heart Rate:  [76-89] 76  Resp:  [20] 20  BP: (166-207)/() 179/71  SpO2:  [89 %-99 %] 95 %  I/O last 3 completed shifts:  In: 3304 [I.V.:3304]  Out: 400 [Urine:400]    Peripheral IV 02/12/18 Left Lower forearm (Active)   Site Assessment WDL 2/13/2018 12:30 AM   Line Status Infusing 2/13/2018 12:30 AM   Phlebitis Scale 0-->no symptoms 2/13/2018 12:30 AM   Infiltration Scale 0 2/13/2018 12:30 AM   Number of days:1       Pressure Ulcer 08/02/16 Proximal Coccyx (Active)   Number of days:560       Pressure Ulcer 08/03/16 Right Hip reddened area with small opening-oozing (Active)   Number of days:559       Pressure Injury 10/11/16 Posterior Buttocks Right cheek cyst looking sore (Active)   Number of days:490       Pressure Injury 10/11/16 Posterior Perineum Sacrum, perineum, penis and scrotum (Active)   Number of days:490       Pressure Injury 10/12/16 Left;Distal;Posterior Leg Left lower leg/ankle approx.  5\" long wound (Active)   Number of days:489       Wound 02/12/16 Bilateral;Lower Leg Ulceration wounds existing for 4 years as result of traumatic fall; serosang drainage on BLE; eschar on left medial lower extremities 8 o'clock position; eschar on dorsal left foot (Active)   Number of days:732       Wound 07/23/16 Bilateral;Lower Leg Ulceration chronic bilateral lower extremity venous stasis changes open weeping wound (serosang) on lateral aspect of LLE, weeping drainage from Left lateral heel wound.  (Active)   Number of days:570       Wound 12/16/17 " Posterior;Proximal Coccyx Suspected pressure ulcer White wound base with blanchable erythema  (Active)   Number of days:59       Wound 12/16/17 Anterior;Left;Right Perineum Extravasation L and R extravasation perineal folds (Active)   Number of days:59     No line/device    Constitutional: Patient is lethargic.  Awakens to voice.  Does not look in any obvious distress.    HEENT: NC/AT, PERRL, EOMI, mouth extremely dry.  With ill fitting dentures., neck supple, no LN.     Cardiovascular: RRR.  No audible murmur, no  rubs, or gallops.  JVD looks flat.  Bruits none.  Pulses 2+    Respiratory: Scattered upper airway rhonchi     Abdomen: Soft, not distended, does complain of pain upon palpation although this is not reproducible.  There is no rebound., no organomegaly. Bowel sounds present    Extremities: Bilateral lower extremities with severe chronic venous stasis changes.  With hyperkeratotic skin.  There is some mild erythema on his left leg coming from medially on his inner thigh about a third of the way up.  Mildly indurated.  Other than that I cannot see any significant purulence.  His right leg has less edema in terms of the wrinkling of his feet.  His left leg has 2+ edema .                Neuro: Very difficult to assess.  He has seemingly contractures of both of his upper extremities.  His lower extremities also are difficult to assess.  He is in a frog-leg type position.  Does wiggle his toes however.  Mental status wise he is lethargic does follow simple commands.        lactated ringers 150 mL/hr at 02/13/18 0610     Warfarin Therapy Reminder         vancomycin (VANCOCIN) IV  2,000 mg Intravenous Q18H     heparin  5,000 Units Subcutaneous Q12H     piperacillin-tazobactam  3.375 g Intravenous Q6H     amLODIPine  5 mg Oral Daily     aspirin chewable tablet 81 mg  81 mg Oral Daily     baclofen (LIORESAL) half-tab 10 mg  10 mg Oral TID     docusate sodium (COLACE) capsule 100 mg  100 mg Oral BID     fluticasone   1-2 spray Both Nostrils Daily     gabapentin (NEURONTIN) capsule 100 mg  100 mg Oral TID     levothyroxine (SYNTHROID/LEVOTHROID) tablet 125 mcg  125 mcg Oral Daily     metoprolol succinate (TOPROL-XL) 24 hr tablet 100 mg  100 mg Oral Daily     risperiDONE (risperDAL) tablet 0.25 mg  0.25 mg Oral At Bedtime     warfarin  2.5 mg Oral ONCE at 18:00       Data     Recent Labs  Lab 02/13/18  0525 02/12/18  1652   WBC 8.9 11.4*   HGB 13.3 15.9   MCV 83 83    256   INR 2.40* 2.31*    145*   POTASSIUM 3.1* 4.2   CHLORIDE 109 108   CO2 26 26   BUN 15 19   CR 1.09 1.16   ANIONGAP 9 11   RASHEED 8.4* 9.2   GLC 97 111*   ALBUMIN  --  3.4   PROTTOTAL  --  7.3   BILITOTAL  --  0.7   ALKPHOS  --  89   ALT  --  21   AST  --  15       No results found for this or any previous visit (from the past 24 hour(s)).

## 2018-02-13 NOTE — PLAN OF CARE
"Problem: Patient Care Overview  Goal: Plan of Care/Patient Progress Review  Outcome: Improving  Reason for hospital stay:  Cellulitis        Most recent vitals: /71 (BP Location: Right arm)  Temp 98.9  F (37.2  C) (Tympanic)  Resp 20  Ht 1.803 m (5' 11\")  Wt 79.6 kg (175 lb 7.8 oz)  SpO2 96%  BMI 24.48 kg/m2    Pain Management:  Dilaudid given x3, appears to be effective with agitation, resting between rounds et less agitated with cares    Orientation:  Lethargic, however starting to open eyes to voice et track with eyes when spoken to.    Cardiac:  BP slightly elevated 179/71 otherwise VSS et sats 96% on r/a.    Respiratory:  Difficult to assess with pt moaning/hollering during asessment, however sounds diminished et clear    GI:  Bowel sounds audible et active, incontinent of bowel    :  Incontinent of bladder    Skin Issues:  Scattered bruises. General skin dry et scaly in appearance, BLE thick scaly skin with scattered scabs above et below dressings to shins (spouse changed dressings later in evening per sift chg report), thick calloused dry skin to bilat feet +1 edema to RLE et +3 edema to LLE. Bilat buttocks et coccyx area blanchable redness et excoriated, applied Z guard barrier cream to areas. Scrotum et penis also very reddened with superficial opened/reddened area to head of penis, applied Z guard barrier cream.    IVF:  /hr    ABX:  Zosyn q6hrs et Vanco q18hrs    Mobility:  Bed bound, assist of 1-2 with repositioning in bed    Safety:  Alarms audible et active et routine checks performed        Comments: Multiple attempts made to remove dentures for oral cares et clean note out d/t nasal opening plugged with boogers w/o success (left et returned, different staff, etc),  pt very resistive et pushing staff away        2/13/2018  3:35 AM  Elizabeth Caro        Face to face report given with opportunity to observe patient.    Report given to Oh HAWKINS et Bryleigh RN Carrie Mugge "   2/13/2018  5:38 AM

## 2018-02-14 NOTE — PHARMACY
[facility]    Pharmacy      Antimicrobial Stewardship Note     Current antimicrobial therapy:  Anti-infectives (Future)    Start     Dose/Rate Route Frequency Ordered Stop    02/13/18 0030  piperacillin-tazobactam (ZOSYN) infusion 3.375 g      3.375 g  100 mL/hr over 30 Minutes Intravenous EVERY 6 HOURS 02/12/18 2033 02/12/18 1751  vancomycin (VANCOCIN) 2,000 mg in D5W 500 mL intermittent infusion      2,000 mg  285 mL/hr over 2 Hours Intravenous EVERY 18 HOURS 02/12/18 1750            Indication: SSTI    Days of Therapy: 3     Pertinent labs:  Creatinine   Creatinine   Date Value Ref Range Status   02/14/2018 0.97 0.66 - 1.25 mg/dL Final   02/13/2018 1.09 0.66 - 1.25 mg/dL Final   02/12/2018 1.16 0.66 - 1.25 mg/dL Final     WBC   WBC   Date Value Ref Range Status   02/14/2018 9.4 4.0 - 11.0 10e9/L Final   02/13/2018 8.9 4.0 - 11.0 10e9/L Final   02/12/2018 11.4 (H) 4.0 - 11.0 10e9/L Final     Procalcitonin No results found for: PCAL  CRP   CRP Inflammation   Date Value Ref Range Status   12/16/2017 20.4 (H) 0.0 - 8.0 mg/L Final   10/12/2016 78.7 (H) 0.0 - 8.0 mg/L Final   08/02/2016 165.0 (H) 0.0 - 8.0 mg/L Final       Culture Results:   Urine Culture Aerobic Bacterial [[id number]] Collected: 02/12/18 1745        Order Status: Completed Specimen: Catheterized Urine Updated: 02/14/18 0758        Specimen Description Catheterized Urine        Culture Micro No growth       Blood culture [[id number]] Collected: 02/12/18 1752       Order Status: Completed Specimen: Blood Updated: 02/14/18 0629        Specimen Description Blood Right Arm        Culture Micro No growth after 2 days           Recommendations/Interventions:  1. No recommendations at this time    Lolis Gruber, [facility]  February 14, 2018

## 2018-02-14 NOTE — PLAN OF CARE
Staff attempted to turn and reposition patient.  Pt became agitated and aggressive, pushing hand towards staff in an aggressive manner.  Will medicate pt with IV haldol.  Will continue to assess patient.

## 2018-02-14 NOTE — PLAN OF CARE
Problem: Patient Care Overview  Goal: Plan of Care/Patient Progress Review  Outcome: Improving  Pt. Has been running a low grade temp through the day, blood pressure elevated this morning, did take his oral blood pressure medications and did come down this afternoon - he was to start Cozaar (was on at home), pt sleeping very soundly - will attempt when awake.  He did have pain medication x 1 - did state his neck was hurting him - relief (sleeping).  Dressing to his legs are C/D/I, have been elevated through the day, his coccyx remains red blanchable - incontinent of both bowel and bladder - aloe cream applied. He ate 100% of breakfast this morning, drinking well when awake. When awake he does answer very few questions - otherwise just stares at you - has been tolerating turns and repositioning.  His fluids have decreased to 10 mL/hr - continues to receive IV antibiotics, wife present for awhile this morning.  Patient has remained free of falls through the day, call light within reach - encouraged to use to make his needs known, bed alarm on and frequent rounding done to assess needs.

## 2018-02-14 NOTE — PLAN OF CARE
Problem: Patient Care Overview  Goal: Plan of Care/Patient Progress Review  Outcome: Improving   02/14/18 0743   OTHER   Plan Of Care Reviewed With patient   Plan of Care Review   Progress improving     VS as charted, lungs clear but dim, bowels active, heart rate regular. Pt has been increasingly more cooperative through out the night. Pt did try to pull out IV, he grabbed it and made the motion but I was able to stop him and he understood that if it came out it would hurt and that I would have to put it back in. He has denied pain this shift, and has been difficult to turn, refusing turn off and on.     Face to face report given with opportunity to observe patient.    Report given to ROSS Sanon   2/14/2018  7:47 AM        Problem: Sepsis/Septic Shock (Adult)  Goal: Signs and Symptoms of Listed Potential Problems Will be Absent, Minimized or Managed (Sepsis/Septic Shock)  Signs and symptoms of listed potential problems will be absent, minimized or managed by discharge/transition of care (reference Sepsis/Septic Shock (Adult) CPG).   Outcome: Improving   02/14/18 0743   Sepsis/Septic Shock   Problems Assessed (Sepsis) all   Problems Present (Sepsis) situational response       Problem: Fall Risk (Adult)  Goal: Identify Related Risk Factors and Signs and Symptoms  Related risk factors and signs and symptoms are identified upon initiation of Human Response Clinical Practice Guideline (CPG).   Outcome: No Change   02/13/18 1958   Fall Risk   Related Risk Factors (Fall Risk) confusion/agitation   Signs and Symptoms (Fall Risk) presence of risk factors

## 2018-02-14 NOTE — PLAN OF CARE
Face to face report given with opportunity to observe patient.    Report given to Isabela Montenegro   2/14/2018  4:13 PM

## 2018-02-14 NOTE — PLAN OF CARE
Problem: Patient Care Overview  Goal: Plan of Care/Patient Progress Review  Outcome: No Change   02/13/18 1958   OTHER   Plan Of Care Reviewed With patient   Plan of Care Review   Progress no change       Pt alert and confused, able to say short simple phrases. Medicated for pain. See MAR. IV antibiotics. Lungs clear. Wife at bedside most of shift. Wife at bedside feeding pt this shift. Face to face report given with opportunity to observe patient.    Report given to Jeanne Ambrocio   2/13/2018  8:09 PM       Problem: Sepsis/Septic Shock (Adult)  Goal: Signs and Symptoms of Listed Potential Problems Will be Absent, Minimized or Managed (Sepsis/Septic Shock)  Signs and symptoms of listed potential problems will be absent, minimized or managed by discharge/transition of care (reference Sepsis/Septic Shock (Adult) CPG).   Outcome: No Change   02/13/18 1958   Sepsis/Septic Shock   Problems Assessed (Sepsis) all   Problems Present (Sepsis) situational response       Problem: Fall Risk (Adult)  Goal: Identify Related Risk Factors and Signs and Symptoms  Related risk factors and signs and symptoms are identified upon initiation of Human Response Clinical Practice Guideline (CPG).   Outcome: No Change   02/13/18 1958   Fall Risk   Related Risk Factors (Fall Risk) confusion/agitation   Signs and Symptoms (Fall Risk) presence of risk factors

## 2018-02-14 NOTE — PROGRESS NOTES
[facility]    Hospitalist Progress Note      Assessment & Plan   Seun Emanuel is a 72 year old male who was admitted on 2/12/2018.     1.  Severe chronic bilateral lower extremity chronic venous stasis dermatitis with possible acute cellulitis: This patient does have evidence of severe chronic issues with his lower extremities.  Has been in and out of the hospital multiple times over the years with presumed abscesses/cellulitis.  He has significant hyperkeratotic skin over both of his lower extremities.  There does not appear to be any focal fluctuance or abscesses at this time.  His left leg he does have some erythema which stretches up above his knee count of on the inner thigh about a third of the way up.  Feels mildly indurated mildly erythematous.  Hard to say if it is tender because he moans and groans where you touch him anywhere.  Did have some mild elevation of his white count.  No significant fevers.  I have not spoken to his wife but per ER reports he has not been doing well the last couple of days in terms of being more lethargic not eating very well usually he becomes this way when he has an infection and most commonly it is due to his legs.     Overall he seems to be doing better.  He has had no fevers no elevation of his white count.  The erythema on his left leg especially apart going up his left thigh is markedly improved.  He is more alert and is getting a little crabby so apparently that is a good thing for him.  Blood cultures have been negative.  He is on Zosyn and vancomycin currently.  Really somewhat difficult to sort out what type of oral medication to put him on.  For concerned about MRSA he was resistant to clindamycin the past although that has worked well as an outpatient for him also.  He was sensitive to Septra.  So I think at this point I probably will try to put him on some Septra and see how he does with that starting tomorrow.  Anticipate if things go well overnight  here today he likely could even be discharged home tomorrow.      2.  Altered mental status: He is much better in terms of his mental status.  He does answer appropriately.  Per his wife he is improved significantly and close to baseline.  Does get some a metabolic encephalopathy due to any type of infection.     3. 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  His INR is 2.61 today.    Pharmacy continues to doses for us.     4. HTN-has a significant history of this.    He is on his Toprol and Norvasc.  We will add back his losartan now.  Tends to run rather hypertensive during his previous hospital stays.        5. Chronic pain-he is no longer on the fentanyl patch at home.  He is only on some oral oxycodone as needed.  Will continue with that while he is here.  His pain seems to be markedly decreased compared to his admission.      6.  Cardiac status: He was a valid by cardiology year or 2 ago.  Had an echocardiogram which showed normal systolic function EF from 50-55%.  They were unable to assess him any further with a nuclear medicine stress test due to inability to lie down.  From a cardiology standpoint they felt no further evaluation was necessary as he was basically asymptomatic and had no evidence of ischemia or heart failure.     7. Volume depletion: With IV fluids he is markedly improved here.  BUN/creatinine are normal.  Will cut back on his IV fluids currently as he is taking in adequate orals.     8.  COPD: Does carry a history of COPD.  He is oxygenating well at this time on room air.  Has some upper airway rhonchi just due to poor clearing of secretions.  Will monitor him closely.  He does have some nebulizer treatments ordered.    9.  Disposition.    The patient is more alert.  After discussing things with his wife she feels more than comfortable and capable of taking him back home regardless as long as he is alert and able to eat.  Structured nursing facility at this time is not  likely to occur.        Diet: Regular Diet Adult  Fluids: tko    # Pain Assessment:   Current Pain Score 2/14/2018 2/14/2018 2/13/2018   Patient currently in pain? denies denies denies   Pain score (0-10) - - -   Pain location - - -   Pain descriptors - - -   - Seun is experiencing pain due to neuropathy. Pain management was discussed and the plan was created in a collaborative fashion.  Seun's response to the current recommendations: ambivalent  - Opioid regimen: oral oxycodone  - Response to opioid medications: Reduction of symptoms   - Bowel regimen: senna and miralax          DVT Prophylaxis: Warfarin  Code Status: DNR/DNI    Disposition: Expected discharge in 1-2 days once on oral abx.    Arpit Ponce    Interval History   No big issues overnight.  Patient is more alert.  Interactive.  He is eating.  No fevers or elevation of his white count.  His legs do look improved.    -Data reviewed today: I reviewed all new labs and imaging results over the last 24 hours. I personally reviewed no images or EKG's today.    Physical Exam   Temp: 99.1  F (37.3  C) Temp src: Tympanic BP: (!) 193/72   Heart Rate: 68 Resp: 20 SpO2: 99 % O2 Device: None (Room air)    Vitals:    02/12/18 1738 02/12/18 2000   Weight: 93 kg (205 lb) 79.6 kg (175 lb 7.8 oz)     Vital Signs with Ranges  Temp:  [99.1  F (37.3  C)-99.2  F (37.3  C)] 99.1  F (37.3  C)  Heart Rate:  [68-80] 68  Resp:  [16-20] 20  BP: (169-193)/(72-96) 193/72  SpO2:  [93 %-99 %] 99 %  I/O last 3 completed shifts:  In: 2462 [P.O.:180; I.V.:2282]  Out: -     Peripheral IV 02/13/18 Left Upper arm (Active)   Site Assessment WDL 2/14/2018  1:00 AM   Line Status Infusing 2/14/2018  1:00 AM   Phlebitis Scale 0-->no symptoms 2/14/2018  1:00 AM   Infiltration Scale 0 2/14/2018  1:00 AM   Number of days:1       Pressure Ulcer 08/02/16 Proximal Coccyx (Active)   Number of days:561       Pressure Ulcer 08/03/16 Right Hip reddened area with small opening-oozing (Active)   Number of  "days:560       Pressure Injury 10/11/16 Posterior Buttocks Right cheek cyst looking sore (Active)   Number of days:491       Pressure Injury 10/11/16 Posterior Perineum Sacrum, perineum, penis and scrotum (Active)   Number of days:491       Pressure Injury 10/12/16 Left;Distal;Posterior Leg Left lower leg/ankle approx.  5\" long wound (Active)   Number of days:490       Wound 02/12/16 Bilateral;Lower Leg Ulceration wounds existing for 4 years as result of traumatic fall; serosang drainage on BLE; eschar on left medial lower extremities 8 o'clock position; eschar on dorsal left foot (Active)   Number of days:733       Wound 07/23/16 Bilateral;Lower Leg Ulceration chronic bilateral lower extremity venous stasis changes open weeping wound (serosang) on lateral aspect of LLE, weeping drainage from Left lateral heel wound.  (Active)   Number of days:571       Wound 12/16/17 Posterior;Proximal Coccyx Suspected pressure ulcer White wound base with blanchable erythema  (Active)   Number of days:60       Wound 12/16/17 Anterior;Left;Right Perineum Extravasation L and R extravasation perineal folds (Active)   Number of days:60     No line/device    Constitutional: He is somewhat lethargic but does arouse to voice.  He is communicative smiling.    HEENT: NC/AT, PERRL, EOMI, mouth moist, neck supple, no LN.     Cardiovascular: RRR. no Murmur, no  rubs, or gallops.  JVD flat na.     Respiratory: Some scattered upper airway rhonchi.      Abdomen: Soft, NTND, no organomegaly. Bowel sounds present    Extremities: Warm/dry.  Less edema the left leg looks less erythematous especially on his distal inner thigh.    Neuro:  Medications     lactated ringers 100 mL/hr at 02/13/18 1519     Warfarin Therapy Reminder         warfarin  2.5 mg Oral ONCE at 18:00     losartan  100 mg Oral Daily     vancomycin (VANCOCIN) IV  2,000 mg Intravenous Q18H     piperacillin-tazobactam  3.375 g Intravenous Q6H     amLODIPine  5 mg Oral Daily     aspirin " chewable tablet 81 mg  81 mg Oral Daily     baclofen  10 mg Oral TID     docusate sodium (COLACE) capsule 100 mg  100 mg Oral BID     fluticasone  1-2 spray Both Nostrils Daily     gabapentin (NEURONTIN) capsule 100 mg  100 mg Oral TID     levothyroxine (SYNTHROID/LEVOTHROID) tablet 125 mcg  125 mcg Oral Daily     metoprolol succinate (TOPROL-XL) 24 hr tablet 100 mg  100 mg Oral Daily     risperiDONE (risperDAL) tablet 0.25 mg  0.25 mg Oral At Bedtime       Data     Recent Labs  Lab 02/14/18  0523 02/13/18  0525 02/12/18  1652   WBC 9.4 8.9 11.4*   HGB 13.9 13.3 15.9   MCV 81 83 83    199 256   INR 2.61* 2.40* 2.31*    144 145*   POTASSIUM 3.2* 3.1* 4.2   CHLORIDE 110* 109 108   CO2 24 26 26   BUN 10 15 19   CR 0.97 1.09 1.16   ANIONGAP 10 9 11   RASHEED 8.4* 8.4* 9.2   GLC 87 97 111*   ALBUMIN  --   --  3.4   PROTTOTAL  --   --  7.3   BILITOTAL  --   --  0.7   ALKPHOS  --   --  89   ALT  --   --  21   AST  --   --  15       No results found for this or any previous visit (from the past 24 hour(s)).

## 2018-02-14 NOTE — PLAN OF CARE
dressings removed from legs, the skin is dry flaky with deep plaques noted throughout, the legs are not weeping at this time but the skin has deep tan and reddish fissures noted, the legs are discolored and the skin is fragile. Dressings are being changed at this time.

## 2018-02-14 NOTE — PLAN OF CARE
Face to face report given with opportunity to observe patient.    Report given to ROSS Green.    Jeanne Ingram   2/13/2018  11:57 PM

## 2018-02-15 NOTE — PLAN OF CARE
Face to face report given with opportunity to observe patient.    Report given to Elizabeth Pratt   2/15/2018  3:06 PM

## 2018-02-15 NOTE — PLAN OF CARE
Problem: Patient Care Overview  Goal: Plan of Care/Patient Progress Review  Outcome: No Change     02/15/18 0804   OTHER   Plan Of Care Reviewed With patient   Plan of Care Review   Progress no change     VS as charted, lungs are dim, bowels active. Heart rate regular. Pt turned and repositioned every 2 hrs, pt is incontinent of urine and stool. Pt initially very angry and refusing things, but did eventually calm down and allow me to do what was needed. BLE scaley, dry bark like skin, dressed with kerlex, wife does them daily, I did not unwrap to look underneath. Pt bottom is very red, both nonblanchable and blanchable areas, pt does complain about wiping being painful, barrier cream applied with every brief change. Pt denied pain this entire shift. Pt given 2 doses of 20 mg Lebatalol for systolic bp >180. A third one was pulled however when I rechecked pt BP it was at 180 systolic, so it was not given, it is locked in the pt room med cabinet due to the fact he is in an ISOLATION room and it was at the time the only dose on the floor so I did not want to waste it.     Face to face report given with opportunity to observe patient.    Report given to ROSS John   2/15/2018  8:12 AM        Problem: Sepsis/Septic Shock (Adult)  Goal: Signs and Symptoms of Listed Potential Problems Will be Absent, Minimized or Managed (Sepsis/Septic Shock)  Signs and symptoms of listed potential problems will be absent, minimized or managed by discharge/transition of care (reference Sepsis/Septic Shock (Adult) CPG).   Outcome: No Change   02/15/18 0804   Sepsis/Septic Shock   Problems Assessed (Sepsis) all   Problems Present (Sepsis) situational response

## 2018-02-15 NOTE — PLAN OF CARE
DATE:  2/15/2018   TIME OF RECEIPT FROM LAB:  0600  LAB TEST:  Potassium  LAB VALUE:  2.7  RESULTS GIVEN WITH READ-BACK TO (PROVIDER):  Arpit Pnoce MD  TIME LAB VALUE REPORTED TO PROVIDER:   0700  Initially paged at 0602, however his return call was missed and he was repaged.

## 2018-02-15 NOTE — PHARMACY
[facility]    Pharmacy    Antimicrobial Stewardship Note     Current antimicrobial therapy:  Anti-infectives (Future)    Start     Dose/Rate Route Frequency Ordered Stop    02/15/18 0715  clindamycin (CLEOCIN) capsule 300 mg      300 mg Oral EVERY 8 HOURS 02/15/18 0706          Indication: SSTI    Days of Therapy: 1     Pertinent labs:  Creatinine   Creatinine   Date Value Ref Range Status   02/15/2018 1.33 (H) 0.66 - 1.25 mg/dL Final   02/14/2018 0.97 0.66 - 1.25 mg/dL Final   02/13/2018 1.09 0.66 - 1.25 mg/dL Final     WBC   WBC   Date Value Ref Range Status   02/15/2018 8.2 4.0 - 11.0 10e9/L Final   02/14/2018 9.4 4.0 - 11.0 10e9/L Final   02/13/2018 8.9 4.0 - 11.0 10e9/L Final     Procalcitonin No results found for: PCAL  CRP   CRP Inflammation   Date Value Ref Range Status   12/16/2017 20.4 (H) 0.0 - 8.0 mg/L Final   10/12/2016 78.7 (H) 0.0 - 8.0 mg/L Final   08/02/2016 165.0 (H) 0.0 - 8.0 mg/L Final     Culture Results:     Blood culture [[id number]] Collected: 02/12/18 1752       Order Status: Completed Specimen: Blood Updated: 02/15/18 0608        Specimen Description Blood Right Arm        Culture Micro No growth after 3 days       Urine Culture Aerobic Bacterial [[id number]] Collected: 02/12/18 9609       Order Status: Completed Specimen: Catheterized Urine Updated: 02/14/18 0758        Specimen Description Catheterized Urine        Culture Micro No growth      Recommendations/Interventions:  1. Zosyn and vancomycin discontinued and clindamycin started today. No recommendations at this time.    Telma Herrera RPH  February 15, 2018

## 2018-02-15 NOTE — PHARMACY-ANTICOAGULATION SERVICE
INR = 2.28 this morning. Vanco and Zosyn discontinued. Home dose would be 2.5 mg warfarin today, therefore will continue his home regimen of 2.5 mg for today.

## 2018-02-15 NOTE — PROGRESS NOTES
[facility]    Hospitalist Progress Note      Assessment & Plan   Seun Emanuel is a 72 year old male who was admitted on 2/12/2018.        1.  Severe chronic bilateral lower extremity chronic venous stasis dermatitis with possible acute cellulitis:     This patient does have evidence of severe chronic issues with his lower extremities.  Has been in and out of the hospital multiple times over the years with presumed abscesses/cellulitis.  He has significant hyperkeratotic skin over both of his lower extremities.  There does not appear to be any focal fluctuance or abscesses at this time.  His left leg did have some erythema which stretches up above his knee count of on the inner thigh about a third of the way up.  Felt mildly indurated mildly erythematous.    Patient's white count has resolved.  He has got no fevers.  The erythema that was on his left leg is markedly improved.  He had been on Zosyn and vancomycin just empirically.  I did discontinue those today as his renal function did go up somewhat.  I decided to put him back on some oral clindamycin which has worked in the past.  Does have a history of MRSA but there is been no documentation of that recently.        2.  Altered mental status:  He does wax and wane somewhat in terms of his mental status.  Yesterday he was little more fatigued and also earlier this morning.  But currently now he is alert speaking full sentences with me and eating his lunch.  He is very tenuous and that little things do seem to tip him over into much more lethargic state.     3. 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  INR is therapeutic pharmacy continues to dose this.     4. HTN-has a significant history of this.    He is on his Toprol and Norvasc.    Always seems to run hypertensive.  I did add back the losartan that he had been on before without any significant change.        5. Chronic pain-he is no longer on the fentanyl patch at home.  He  is only on some oral oxycodone as needed.  Will continue with that while he is here.    He states he is not having any real pain currently.  Does get the oxycodone orally as needed.      6.  Cardiac status: He was evaluated by cardiology year or 2 ago.  Had an echocardiogram which showed normal systolic function EF from 50-55%.  They were unable to assess him any further with a nuclear medicine stress test due to inability to lie down.  From a cardiology standpoint they felt no further evaluation was necessary as he was basically asymptomatic and had no evidence of ischemia or heart failure.  Also the patient did not want to have any interventions performed.      7. Volume depletion: Initially came in volume depleted.  BUN/creatinine did improve but now once again they have declined.  I think yesterday he was not taking and adequately oral fluids.  I had stopped his IV fluids also.  We will restart those today as his creatinine jumped up to 1.33.  We will try to reinforce daily weights also so I can keep better track of his volume status as he is incontinent frequently.  Potassium is on the low side we will replace this.        8.  COPD: Does carry a history of COPD.  He is oxygenating well at this time on room air.  Has some upper airway rhonchi just due to poor clearing of secretions.  Will monitor him closely.  He does have some nebulizer treatments ordered.  Oxygen saturation remains fine on room air.     9.  Disposition.     Tentative plan is for him to return home.  Just want to make sure that his mental status remains stable prior to discharge.  Possibly tomorrow or over the weekend.  His wife expresses the same concern.      Diet: Regular Diet Adult  Fluids:  an hour    # Pain Assessment:   Current Pain Score 2/15/2018 2/15/2018 2/15/2018   Patient currently in pain? denies sleeping: patient not able to self report denies   Pain score (0-10) - - -   Pain location - - -   Pain descriptors - - -   - Seun  is experiencing pain due to legs. Pain management was discussed with Seun and his spouse and the plan was created in a collaborative fashion.  Seun's response to the current recommendations: mixed response  - Opioid regimen: oxycodone  - Response to opioid medications: Reduction of symptoms pain  - Bowel regimen: docusate        DVT Prophylaxis: Warfarin  Code Status: DNR/DNI    Disposition: Expected discharge in 1-2 days once renal function stable and mental status stable.      Arpit Ponce    Interval History   Relatively pleasant.  Is feeding himself.  Denies any pain or shortness of breath.    -Data reviewed today: I reviewed all new labs and imaging results over the last 24 hours. I personally reviewed no images or EKG's today.    Physical Exam   Temp: 98.5  F (36.9  C) Temp src: Tympanic BP: (!) 188/81   Heart Rate: 67 Resp: 20 SpO2: 95 % O2 Device: None (Room air)    Vitals:    02/12/18 1738 02/12/18 2000   Weight: 93 kg (205 lb) 79.6 kg (175 lb 7.8 oz)     Vital Signs with Ranges  Temp:  [98.5  F (36.9  C)-100.2  F (37.9  C)] 98.5  F (36.9  C)  Heart Rate:  [64-82] 67  Resp:  [16-20] 20  BP: (174-211)/(71-90) 188/81  SpO2:  [94 %-98 %] 95 %  I/O last 3 completed shifts:  In: 1456 [P.O.:600; I.V.:856]  Out: -     Peripheral IV 02/13/18 Left Upper arm (Active)   Site Assessment WDL 2/15/2018 12:32 PM   Line Status Infusing;Checked every 1-2 hour 2/15/2018 12:32 PM   Phlebitis Scale 0-->no symptoms 2/15/2018 12:32 PM   Infiltration Scale 0 2/15/2018 12:32 PM   Number of days:2       Pressure Ulcer 08/02/16 Proximal Coccyx (Active)   Number of days:562       Pressure Ulcer 08/03/16 Right Hip reddened area with small opening-oozing (Active)   Number of days:561       Pressure Injury 10/11/16 Posterior Buttocks Right cheek cyst looking sore (Active)   Number of days:492       Pressure Injury 10/11/16 Posterior Perineum Sacrum, perineum, penis and scrotum (Active)   Number of days:492       Pressure Injury  "10/12/16 Left;Distal;Posterior Leg Left lower leg/ankle approx.  5\" long wound (Active)   Number of days:491       Wound 02/12/16 Bilateral;Lower Leg Ulceration wounds existing for 4 years as result of traumatic fall; serosang drainage on BLE; eschar on left medial lower extremities 8 o'clock position; eschar on dorsal left foot (Active)   Number of days:734       Wound 07/23/16 Bilateral;Lower Leg Ulceration chronic bilateral lower extremity venous stasis changes open weeping wound (serosang) on lateral aspect of LLE, weeping drainage from Left lateral heel wound.  (Active)   Number of days:572       Wound 12/16/17 Posterior;Proximal Coccyx Suspected pressure ulcer White wound base with blanchable erythema  (Active)   Number of days:61       Wound 12/16/17 Anterior;Left;Right Perineum Extravasation L and R extravasation perineal folds (Active)   Number of days:61     No line/device    Constitutional: Alert and oriented x3. No distress    HEENT: NC/AT, PERRL, EOMI, mouth moist, neck supple, no LN.     Cardiovascular: RRR. no Murmur, no  rubs, or gallops.  JVD no.  Bruits no.  Pulses 2+    Respiratory:  stil with some rhonchi upper airway source    Abdomen: Soft, NTND, no organomegaly. Bowel sounds present    Extremities: Warm/dry. Edema left leg improved  Less erythema    Neuro:   Non focal, cranial nerves intact, Moves all extremities.    Medications     lactated ringers 125 mL/hr at 02/15/18 0937     Warfarin Therapy Reminder         labetalol         clindamycin  300 mg Oral Q8H     warfarin  2.5 mg Oral ONCE at 18:00     losartan  100 mg Oral Daily     nicotine   Transdermal Q8H     nicotine   Transdermal Daily     nicotine  1 patch Transdermal Daily     amLODIPine  5 mg Oral Daily     aspirin chewable tablet 81 mg  81 mg Oral Daily     baclofen  10 mg Oral TID     docusate sodium (COLACE) capsule 100 mg  100 mg Oral BID     fluticasone  1-2 spray Both Nostrils Daily     gabapentin (NEURONTIN) capsule 100 mg  " 100 mg Oral TID     levothyroxine (SYNTHROID/LEVOTHROID) tablet 125 mcg  125 mcg Oral Daily     metoprolol succinate (TOPROL-XL) 24 hr tablet 100 mg  100 mg Oral Daily     risperiDONE (risperDAL) tablet 0.25 mg  0.25 mg Oral At Bedtime       Data     Recent Labs  Lab 02/15/18  0533 02/14/18  0523 02/13/18  0525 02/12/18  1652   WBC 8.2 9.4 8.9 11.4*   HGB 13.2* 13.9 13.3 15.9   MCV 81 81 83 83    201 199 256   INR 2.28* 2.61* 2.40* 2.31*    144 144 145*   POTASSIUM 2.7* 3.2* 3.1* 4.2   CHLORIDE 112* 110* 109 108   CO2 26 24 26 26   BUN 12 10 15 19   CR 1.33* 0.97 1.09 1.16   ANIONGAP 6 10 9 11   RASHEED 8.2* 8.4* 8.4* 9.2   * 87 97 111*   ALBUMIN  --   --   --  3.4   PROTTOTAL  --   --   --  7.3   BILITOTAL  --   --   --  0.7   ALKPHOS  --   --   --  89   ALT  --   --   --  21   AST  --   --   --  15       No results found for this or any previous visit (from the past 24 hour(s)).

## 2018-02-15 NOTE — PLAN OF CARE
Problem: Patient Care Overview  Goal: Plan of Care/Patient Progress Review  Outcome: No Change  Upon general assessment, after the nursing students were in to see patient, patient was noted to have a low grade temp, and was administered tylenol accordingly, however it was difficult to rouse patirent to swallow, patient did eventually swallow the tylenol and was able to report his neck hurt and patient was repositioned, and reported adequate pain relief, patients blood pressure was elevated and the PRN order for labetalol was to be administered for blood pressure of 184/84 however after the tylenol was administered the blood pressure came down to 174/72 and the labetalol was not administered, patient has been turned and repositioned frequently, patient did have dressings changed this evening, patient has been pleasant and compliant.

## 2018-02-15 NOTE — PLAN OF CARE
Face to face report given with opportunity to observe patient.    Report given to Isabela Solis   2/14/2018  7:26 PM

## 2018-02-15 NOTE — PLAN OF CARE
Face to face report given with opportunity to observe patient.    Report given to Peter Mendez RN  2/14/2018  11:29 PM

## 2018-02-15 NOTE — PLAN OF CARE
"In to change and turn pt, and do assessment. Pt refusing temps, refusing bp checks, I was able to get a forearm BP that was elevated at 211/86, however pt wouldn't stop moving or tensing up. Will recheck after antibiotic infuses. Pt was angry about being changed, calling me and the aid \"fuckers\", denying pain except when wiping his bottom. Pt refusing to let us clean up his nose which is covered in dried snot.   "

## 2018-02-16 PROBLEM — D68.51 FACTOR V LEIDEN MUTATION (H): Chronic | Status: ACTIVE | Noted: 2017-01-01

## 2018-02-16 PROBLEM — I87.2 CHRONIC VENOUS STASIS DERMATITIS OF BOTH LOWER EXTREMITIES: Status: ACTIVE | Noted: 2018-01-01

## 2018-02-16 NOTE — PLAN OF CARE
"Problem: Sepsis/Septic Shock (Adult)  Goal: Signs and Symptoms of Listed Potential Problems Will be Absent, Minimized or Managed (Sepsis/Septic Shock)  Signs and symptoms of listed potential problems will be absent, minimized or managed by discharge/transition of care (reference Sepsis/Septic Shock (Adult) CPG).   Outcome: Improving   02/15/18 1615   Sepsis/Septic Shock   Problems Assessed (Sepsis) all   Problems Present (Sepsis) situational response       Problem: Fall Risk (Adult)  Goal: Identify Related Risk Factors and Signs and Symptoms  Related risk factors and signs and symptoms are identified upon initiation of Human Response Clinical Practice Guideline (CPG).   Outcome: Improving   02/15/18 1615   Fall Risk   Related Risk Factors (Fall Risk) confusion/agitation   Signs and Symptoms (Fall Risk) presence of risk factors     Most recent vitals: /79 (BP Location: Right arm)  Temp 99.3  F (37.4  C) (Tympanic)  Resp 18  Ht 1.803 m (5' 11\")  Wt 79.6 kg (175 lb 7.8 oz)  SpO2 94%  BMI 24.48 kg/m2    Pain Management:  PRN roxicodone and dilaudid given with good effect    Orientation:  Alert and oriented. Possibly some mild confusion at times.    Cardiac:  WDL    Respiratory:  WDL    GI:  Bowel sounds active and audible. BM x1 this shift. Formed, brown, smear. Scheduled stool softener given. Bowel incontinence    :  Urinary incontinence    Skin Issues:  Blanchable redness on bottom. Turned/repositioned q2 hours. Lower extremities dressed. Both feet with an extremely thick layer of dry cracked skin.    IVF:  IV site draining. IV removed. MD aware, does not want it replaced.    ABX:  Oral cleocin    Mobility:  Bedfast. Turned/repositioned q2 hours.    Safety:  Alarms active and audible.       2/15/2018  10:41 PM  Elizabeth Cantu    Face to face report given with opportunity to observe patient.    Report given to Peter Cantu   2/15/2018  11:21 PM            "

## 2018-02-16 NOTE — PHARMACY-ANTICOAGULATION SERVICE
Clinical Pharmacy- Warfarin Discharge Note  This patient is currently on warfarin for the treatment of Atrial fibrillation.  INR Goal= 2-3    Anticoagulation Dose History     Recent Dosing and Labs Latest Ref Rng & Units 12/18/2017 12/19/2017 2/12/2018 2/13/2018 2/14/2018 2/15/2018 2/16/2018    Warfarin 2.5 mg - 2.5 mg - - 2.5 mg 2.5 mg 2.5 mg -    INR 0.80 - 1.20 1.54(H) 1.09 2.31(H) 2.40(H) 2.61(H) 2.28(H) 2.11(H)        Vitamin K doses administered during the last 7 days: 0  FFP administered during the last 7 days: 0    Called [facility] to relay the patient's anticoagulation information to Dr. Peter's nurse. The  said he is out of the office until Tuesday but gave me a number to fax the information to ([phone]). This included the date, INRs, subsequent doses, any antibiotics or interacting medications, and discharge information. Received communication result that the fax went through correctly.

## 2018-02-16 NOTE — PROGRESS NOTES
Name: Seun Emanuel    MRN#: [id number]    Reason for Hospitalization: Bacterial sepsis (H) [A41.9]  Pilonidal cyst [L05.91]  Disorientation [R41.0]  Cellulitis of lower extremity, unspecified laterality [L03.119]    Discharge Date: February 16, 2018    Patient / Family response to discharge plan: agreeable     Follow-Up Appt: No future appointments.    Other Providers (Care Coordinator, County Services, PCA services etc): Yes: [facility] - Zana CHAVEZ updated that patient will be returning home this weekend.     Discharge Disposition: Home with [facility] will see patient this weekend. Wife updated and is agreeable. Per wife's request she asked that [facility] be arranged [facility], [facility].     Nursing updated.     Bonnie Ybarra

## 2018-02-16 NOTE — PHARMACY
[facility]    Pharmacy      Antimicrobial Stewardship Note     Current antimicrobial therapy:  Anti-infectives (Future)    Start     Dose/Rate Route Frequency Ordered Stop    02/15/18 0715  clindamycin (CLEOCIN) capsule 300 mg      300 mg Oral EVERY 8 HOURS 02/15/18 0706            Indication: SSTI    Days of Therapy: 2     Pertinent labs:  Creatinine   Creatinine   Date Value Ref Range Status   02/16/2018 1.24 0.66 - 1.25 mg/dL Final   02/15/2018 1.33 (H) 0.66 - 1.25 mg/dL Final   02/14/2018 0.97 0.66 - 1.25 mg/dL Final     WBC   WBC   Date Value Ref Range Status   02/16/2018 10.5 4.0 - 11.0 10e9/L Final   02/15/2018 8.2 4.0 - 11.0 10e9/L Final   02/14/2018 9.4 4.0 - 11.0 10e9/L Final     Procalcitonin No results found for: PCAL  CRP   CRP Inflammation   Date Value Ref Range Status   12/16/2017 20.4 (H) 0.0 - 8.0 mg/L Final   10/12/2016 78.7 (H) 0.0 - 8.0 mg/L Final   08/02/2016 165.0 (H) 0.0 - 8.0 mg/L Final       Culture Results:     Blood culture [[id number]] Collected: 02/12/18 1752       Order Status: Completed Specimen: Blood Updated: 02/16/18 0624        Specimen Description Blood Right Arm        Culture Micro No growth after 4 days       Urine Culture Aerobic Bacterial [[id number]] Collected: 02/12/18 1745       Order Status: Completed Specimen: Catheterized Urine Updated: 02/14/18 0758        Specimen Description Catheterized Urine        Culture Micro No growth       Blood culture [[id number]] Collected: 02/12/18 1820       Order Status: Completed Specimen: Blood Updated: 02/12/18 1826        Specimen Description Blood        Culture Micro Canceled, Test credited         Test canceled by physician           Recommendations/Interventions:  1. None at this time.    Annia Do, [facility]  February 16, 2018

## 2018-02-16 NOTE — PLAN OF CARE
Problem: Patient Care Overview  Goal: Plan of Care/Patient Progress Review  Outcome: Adequate for Discharge Date Met: 02/16/18  VSS.  Afebrile.  Alert and oriented and pleasant.  States he is going home today .  Patients wife here early and bathed patient, fed patient and is going to take patient home this afternoon.

## 2018-02-16 NOTE — DISCHARGE SUMMARY
Admit Date:     02/12/2018   Discharge Date:           DATE OF ADMISSION:  02/12/2018    DATE OF DISCHARGE:  02/16/2018      DISCHARGE DIAGNOSES:   1.  Cellulitis, left lower extremity.   2.  Metabolic encephalopathy due to his cellulitis.   3.  Hypertension.   4.  Chronic venous stasis dermatitis.   5.  Acute on chronic renal insufficiency.   6.  Hypokalemia.   7.  Chronic bilateral lower extremity chronic venous stasis dermatitis with acute cellulitis with SIRS sepsis ruled out.  PROCEDURES:  None.      COMPLICATIONS:  None.      HOSPITAL COURSE:  Seun is a 72-year-old gentleman who has a significant history of a previous stroke.  Also has a history of some traumatic brain injury.  For the most part, he is unable to ambulate.  He is standby assistance at most.  His wife cares for him at home using a lift.  He has a significant prolonged history of horrible chronic venous stasis dermatitis with hyperkeratosis with numerous rounds of cellulitis that occur.  The last day or 2 prior to his admission he started to get some low-grade fevers and become a little more confused.  Whenever he gets any kind of infection he basically mental status-wise is unable to function and unable to cooperate with his wife, feed himself, etc.  Therefore, when this occurred he was brought to the ER for evaluation.  In the ER he had some mild fevers.  The left leg did have some erythema that was predominantly most noticeable above his medial knee area.  A little indurated also.  That left leg looked a little more swollen, just did not look as good as the right leg.  So it was felt this probably was a cellulitis that was causing his problem.  Initially he was put on Zosyn and vancomycin for a couple of days.  He then was switched over to clindamycin but did not feel that this likely was MRSA.  He tolerated this well with good response.  Today, he is doing fine.  He has had no fevers.  The cellulitis area in his left leg has improved  markedly, so he is much better.  Mental status-wise he is back to his baseline and is able to feed himself.  His wife is happy with how he is.  He is alert and interacting quite well.  At this time we feel he is ready to be discharged home.  Does have persistent hypertension that has been a problem it looks like constantly.  He is on significant amounts of medications.  I did not add any other medications at this point.  Will just need to follow this as an outpatient and this can be titrated up by his primary care provider.  Today at discharge he is doing well.  His blood pressure is 170/70, temperature is 99, heart rate 74, saturation 95% on room air.  His last set of lab work shows that his sodium is 144, potassium 3.9, chloride 113, CO2 is 27, BUN is 14, creatinine is 1.24.  Hemoglobin is 13.3, white count 10,000.  INR is 2.11.  At this point, he is discharged home.      DISCHARGE MEDICATIONS:   1.  Clindamycin 300 mg every 8 hours for 5 days.   2.  Potassium chloride 20 mEq daily.     3.  Warfarin will be 2.5 mg daily.   4.  Albuterol inhaler 2 puffs every 4 hours p.r.n.    5.  Albuterol nebs every 2 hours p.r.n. shortness of breath.   6.  Amlodipine 5 mg daily.   7.  Aspirin 81 mg daily.   8.  Baclofen 10 mg t.i.d.   9.  Colchicine 0.6 mg daily as needed.   10.  Docusate sodium 100 mg b.i.d.   11.  Flonase 1-2 sprays both nostrils daily.     12.  Haldol 5 mg every 8 hours p.r.n. agitation.     13.  Norco 10/325 one every 6 hours p.r.n. moderate to severe pain.   14.  Hydroxyzine 50 mg t.i.d. p.r.n. itching.   15.  Lisinopril 10 mg daily.   16.  Losartan 100 mg daily.   17.  Metoprolol succinate 100 mg daily.   18.  Neurontin 100 mg t.i.d.   19.  Oxycodone IR 15 mg every 8 hours p.r.n.     20.  Risperidone 0.25 mg daily.   21.  Synthroid 125 mg daily.   22.  Torsemide 20 mg daily.   23.  Ambien 10 mg at bedtime p.r.n.      ACTIVITY:  He is up with a lift with his wife.      DIET:  Regular diet as tolerated.       FOLLOWUP:  He should have followup with Dr. Peter in the next 1-2 weeks.      CODE STATUS:  He is DNR/DNI status.         LORRIE VALLES MD             D: 2018   T: 2018   MT: [id number]      Name:     SHILPA COHN   MRN:      [id number]        Account:        [id number]   :      1945           Admit Date:     2018                                  Discharge Date:       Document: [id number]       cc: Herbie Peter DO

## 2018-02-16 NOTE — PLAN OF CARE
Problem: Patient Care Overview  Goal: Plan of Care/Patient Progress Review  Outcome: Improving   02/16/18 0643   OTHER   Plan Of Care Reviewed With patient   Plan of Care Review   Progress improving     VSS, heart rate regular, lungs clear but dim, bowels active. Pt has been turned and repo every 2 hours. Alert and oriented, makes needs known and is using his call light.     Face to face report given with opportunity to observe patient.    Report given to ROSS Hand   2/16/2018  7:35 AM

## 2018-02-16 NOTE — PLAN OF CARE
Patient discharged at 1330PM via wheel chair accompanied by spouse and staff. Prescriptions sent to patients preferred pharmacy. All belongings sent with patient.     Discharge instructions reviewed with wife. Listed belongings gathered and returned to patient. clothing    Patient discharged to home.   Report called to n/a    Core Measures and Vaccines  Core Measures applicable during stay: No. If yes, state diagnosis: n/a  Pneumonia and Influenza given prior to discharge, if indicated: N/A    Surgical Patient   Surgical Procedures during stay: none  Did patient receive discharge instruction on wound care and recognition of infection symptoms? N/A    MISC  Follow up appointment made:  No, wife wanted to make appointment  Home and hospital aquired medications returned to patient: N/A  Patient reports pain was well managed at discharge: Yes

## 2018-02-21 NOTE — TELEPHONE ENCOUNTER
Seun Emanuel, was discharged to home on 2/16/18   from [facility]. I spoke today with his wife Lakshmi  regarding his discharge.   She indicates they have receive(d) sufficient information upon discharge. Medications were reviewed in full on discharge, including: Medications to be started; medications to be continued from preadmission and any side effects. Prescriptions were received at discharge and were able to be filled. Medications are being taken as prescribed.   She indicates they have an appointment scheduled for March 1  and have transportation available. She was  able to confirm their appointment time and has  it written down.   She did not have any questions regarding discharge instructions or condition.  Per their request, the following employee (s) can be recognized for their outstanding services delivered:  They all treat him so good. Everyone was wonderful.  Suggestions to improve service: Nothing indicated.  She was informed they may receive a survey in the mail from the hospital. Asked if they would kindly complete the survey in order for [facility] to know if services fully met patient needs.

## 2018-03-27 NOTE — PROGRESS NOTES
"Notified at about 420pm by ED Supervisor to assist with patient. Patient has had work up with review of hospitalist for admission. At the time of  evaluation,  Available work up did not suggest admission as determined by Hosptialist MD, however, wife does not wish to bring patient home. At this point writer asked for assistance. Writer contacted Julieta, supervisor,  from the patient financial. Letter for non coverage brought to patients room. Explained that the MD's eval does not show reason for admission. Medicare coverage would not pay for hospital stay in which, patient would be responsible for the hospital stay. Base pricing is aproximatley 3,000/day.  Wife states, \"No. I will take him home.\"  Wife expresses frustration of Seun not being admitted.  Notified nursing and provider of wife's wishes to bring Seun home  .   Note: patient would be private pay for NH facility starting at 5,000 deposit as patient does not have a qualifying inpatient stay.   "

## 2018-03-27 NOTE — ANESTHESIA POSTPROCEDURE EVALUATION
Patient: Seun Emanuel    * No procedures listed *    Diagnosis:* No pre-op diagnosis entered *  Diagnosis Additional Information: No value filed.    Anesthesia Type:  No value filed.    Note:  Anesthesia Post Evaluation    Last vitals:  Vitals:    03/27/18 1112   BP: (!) 185/92   Resp: 16   Temp: 97  F (36.1  C)   SpO2: 97%         Electronically Signed By: YEE Cardenas CRNA  March 27, 2018  1:23 PM

## 2018-03-27 NOTE — ED NOTES
Patient discharged to home with wife via [facility] transport, using reclining wheelchair. Patient and family verbalize/demonstrate understanding of all written and verbal instructions. All questions answered.

## 2018-03-27 NOTE — DISCHARGE INSTRUCTIONS
- Close follow up tomorrow/Thursday with Dr Peter to review CT and consider MRI. Confusion could be related to findings on the scan.  - Fevers, skin changes, worsening despite the fluids today should come back here sooner.

## 2018-03-27 NOTE — ED NOTES
DATE:  3/27/2018   TIME OF RECEIPT FROM LAB: 1221  LAB TEST:  Lactic acid   LAB VALUE:  2.2  RESULTS GIVEN WITH READ-BACK TO (PROVIDER): Dr. Martinez   TIME LAB VALUE REPORTED TO PROVIDER:  1227

## 2018-03-27 NOTE — ED AVS SNAPSHOT
[facility]    [address] [address]    Phone:  [phone]                                       Seun Emanuel   MRN: [id number]    Department:  [facility]   Date of Visit:  3/27/2018           Patient Information     Date Of Birth          1945        Your diagnoses for this visit were:     Altered mental status, unspecified altered mental status type     Abnormal CT scan, head        You were seen by Stu Echavarria PA.      Follow-up Information     Follow up with Herbie Peter DO In 1 day.    Specialty:  Family Practice    Contact information:    [facility]  [address] [facility] [address] [phone]          Follow up with [facility].    Specialty:  EMERGENCY MEDICINE    Why:  If symptoms worsen    Contact information:    [address] [address] [phone]    Additional information:    From [place]: Take [address]. Turn left at [address]. Turn left at the first stoplight on [address] [place] [address]. At the first stop sign, take a right onto [address]. Take a left into the parking lot and continue through until you reach the North enterance of the building.       From [place]: Take [address]. Take the [address] ramp towards [address]. Turn left onto [address]. Take a slight right onto [address]. Turn left at the first stoplight on [address]. At the first stop sign, take a right onto [address]. Take a left into the parking lot and continue through until you reach the North enterance of the building.       From [place]: Take [address]. Take a right at [address] [facility]. At the first stop sign, take a right onto [address]. Take a left into the parking lot and continue through until you reach the North enterance of the building.         Discharge Instructions       - Close follow up tomorrow/Thursday with   [place] to review CT and consider MRI. Confusion could be related to findings on the scan.  - Fevers, skin changes, worsening despite the fluids today should come back here sooner.     Discharge References/Attachments     CONFUSION (ENGLISH)         Review of your medicines      Our records show that you are taking the medicines listed below. If these are incorrect, please call your family doctor or clinic.        Dose / Directions Last dose taken    ACETAMINOPHEN PO   Dose:  500 mg        Take 500 mg by mouth every 6 hours as needed for pain   Refills:  0        ADVAIR DISKUS 250-50 MCG/DOSE diskus inhaler   Dose:  1 puff   Generic drug:  fluticasone-salmeterol        Inhale 1 puff into the lungs 2 times daily   Refills:  0        * ALBUTEROL INHALER 17GM   Dose:  2 puff        Inhale 2 puffs into the lungs every 4 hours as needed This product no longer available   Refills:  0        * albuterol (2.5 MG/3ML) 0.083% neb solution   Dose:  1 vial        Take 1 vial by nebulization every 2 hours as needed for shortness of breath / dyspnea or wheezing   Refills:  0        amLODIPine 10 MG tablet   Commonly known as:  NORVASC   Dose:  5 mg   Quantity:  30 tablet        Take 0.5 tablets (5 mg) by mouth daily   Refills:  1        ASPIRIN PO   Dose:  81 mg        Take 81 mg by mouth daily   Refills:  0        BACLOFEN PO   Dose:  10 mg        Take 10 mg by mouth 3 times daily   Refills:  0        COLCHICINE PO   Dose:  0.6 mg        Take 0.6 mg by mouth daily as needed   Refills:  0        COUMADIN 2.5 MG tablet   Quantity:  30 tablet   Generic drug:  warfarin        2.5 mg daily   Refills:  1        DIPHENHYDRAMINE HCL PO   Dose:  25 mg        Take 25 mg by mouth One to two tabs prn itching   Refills:  0        diphenhydrAMINE-zinc acetate cream   Commonly known as:  BENADRYL   Dose:  2 g        Apply 2 g topically 3 times daily as needed for itching (for itching legs)   Refills:  0        DOCUSATE SODIUM PO   Dose:  100  mg   Indication:  1-2 Tabs        Take 100 mg by mouth 2 times daily   Refills:  0        esomeprazole 40 MG CR capsule   Commonly known as:  nexIUM   Dose:  40 mg        Take 40 mg by mouth daily with food   Refills:  0        fentaNYL 75 mcg/hr 72 hr patch   Commonly known as:  DURAGESIC   Dose:  1 patch        Apply 1 patch topically daily   Refills:  0        FLORASTOR 250 MG capsule   Dose:  250 mg   Generic drug:  saccharomyces boulardii        Take 250 mg by mouth 2 times daily   Refills:  0        fluticasone 50 MCG/ACT spray   Commonly known as:  FLONASE   Dose:  1-2 spray   Quantity:  1 Package        Spray 1-2 sprays into both nostrils daily   Refills:  0        haloperidol 5 MG tablet   Commonly known as:  HALDOL   Dose:  5 mg        Take 5 mg by mouth every 8 hours as needed for agitation   Refills:  0        HYDROcodone-acetaminophen  MG per tablet   Commonly known as:  NORCO   Dose:  1 tablet   Quantity:  60 tablet        Take 1 tablet by mouth every 6 hours as needed for moderate to severe pain   Refills:  0        HYDROXYZINE HCL PO   Dose:  50 mg        Take 50 mg by mouth 3 times daily as needed for itching   Refills:  0        ipratropium - albuterol 0.5 mg/2.5 mg/3 mL 0.5-2.5 (3) MG/3ML neb solution   Commonly known as:  DUONEB        Inhale into the lungs every 6 hours as needed   Refills:  0        lidocaine 2 % topical gel   Commonly known as:  XYLOCAINE   Dose:  1 Tube        Apply 1 Tube topically as needed for moderate pain (apply to itching legs 3-4 times daily)   Refills:  0        lisinopril 10 MG tablet   Commonly known as:  PRINIVIL/ZESTRIL   Dose:  10 mg   Quantity:  30 tablet        Take 1 tablet (10 mg) by mouth daily   Refills:  0        LOSARTAN POTASSIUM PO   Dose:  100 mg        Take 100 mg by mouth daily   Refills:  0        METOPROLOL SUCCINATE ER PO   Dose:  100 mg        Take 100 mg by mouth daily   Refills:  0        Multi-vitamin Tabs tablet   Dose:  1 tablet         Take 1 tablet by mouth daily   Refills:  0        NEURONTIN PO   Dose:  100 mg        Take 100 mg by mouth 3 times daily   Refills:  0        nicotine 21 MG/24HR 24 hr patch   Commonly known as:  NICODERM CQ   Dose:  1 patch        1 patch daily   Refills:  0        * order for DME   Quantity:  1 Device        Equipment being ordered: Other: *Neb machine** Treatment Diagnosis: *COPD**   Refills:  0        * order for DME   Quantity:  1 Device        Equipment being ordered: Bed Cradle   Refills:  0        * order for DME   Quantity:  1 Package        Equipment being ordered: Other: **Rook boots for chronic stasis dermatitis and ulcers.* Treatment Diagnosis: *CKD3 , chronic edema with ulcers**   Refills:  2        * order for DME   Quantity:  1 Device        Equipment being ordered: Other: rollo cushion and arm sling Treatment Diagnosis: stage 2 pressure sores and left should pain and OA of ac joint   Refills:  0        OXYCODONE HCL PO   Dose:  15 mg   Indication:  10-15 mg every 8 hours as needed        Take 15 mg by mouth every 8 hours as needed   Refills:  0        Potassium Chloride ER 20 MEQ Tbcr   Dose:  20 mEq   Quantity:  30 tablet        Take 1 tablet (20 mEq) by mouth daily   Refills:  1        RISPERIDONE PO   Dose:  0.25 mg        Take 0.25 mg by mouth   Refills:  0        SYNTHROID PO   Dose:  125 mcg        Take 125 mcg by mouth daily   Refills:  0        TORSEMIDE PO   Dose:  20 mg        Take 20 mg by mouth daily   Refills:  0        triamcinolone 0.1 % ointment   Commonly known as:  KENALOG        Apply topically 2 times daily as needed for irritation   Refills:  0        UNABLE TO FIND        MEDICATION NAME: Phenylephrine-DM-acetaminophen Bid PRN   Refills:  0        Vancomycin 100 mg/mL Solr   Commonly known as:  VANCOCIN        1250 mg via graft or port every 24 hours for skin and soft tissue infection for 7 administrations. Infuse over 2 hours   Refills:  0        VITAMIN B COMPLEX-C Caps  "  Dose:  1 capsule        Take 1 capsule by mouth daily   Refills:  0        ZOLPIDEM TARTRATE PO   Dose:  10 mg        Take 10 mg by mouth nightly as needed   Refills:  0        * Notice:  This list has 6 medication(s) that are the same as other medications prescribed for you. Read the directions carefully, and ask your doctor or other care provider to review them with you.            Procedures and tests performed during your visit     Procedure/Test Number of Times Performed    Blood culture 2    CBC with platelets differential 1    CT Head w/o Contrast 1    Comprehensive metabolic panel 1    INR 1    Lactic acid 1    Measure urine output 1    Pulse oximetry nursing 1    UA with Microscopic 1    Urine Culture Aerobic Bacterial 1    XR Chest 2 Views 1      Orders Needing Specimen Collection     None      Pending Results     Date and Time Order Name Status Description    3/27/2018 1126 Blood culture In process     3/27/2018 1126 Blood culture In process     3/27/2018 1115 Urine Culture Aerobic Bacterial In process             Pending Culture Results     Date and Time Order Name Status Description    3/27/2018 1126 Blood culture In process     3/27/2018 1126 Blood culture In process     3/27/2018 1115 Urine Culture Aerobic Bacterial In process             Thank you for choosing [facility]       Thank you for choosing [facility] for your care. Our goal is always to provide you with excellent care. Hearing back from our patients is one way we can continue to improve our services. Please take a few minutes to complete the written survey that you may receive in the mail after you visit with us. Thank you!        [facility] Information     [facility] lets you send messages to your doctor, view your test results, renew your prescriptions, schedule appointments and more. To sign up, go to [email or web address] . Click on \"Log in\" on the left side of the screen, which will take you to the Welcome page. Then click on \"Sign up Now\" " on the right side of the page.     You will be asked to enter the access code listed below, as well as some personal information. Please follow the directions to create your username and password.     Your access code is: [email or web address]  Expires: 2018  1:33 PM     Your access code will  in 90 days. If you need help or a new code, please call your [place] clinic or [phone].        Care EveryWhere ID     This is your Care EveryWhere ID. This could be used by other organizations to access your [facility] [place] medical records  [id number]        Equal Access to Services     [facility] : Jack Birch, ashleigh trujillo, reema hoffman, michelle blue . So [facility] [phone].    ATENCIÓN: Si habla español, tiene a martinez disposición servicios gratuitos de asistencia lingüística. Llame al [phone].    We comply with applicable federal civil rights laws and [place] laws. We do not discriminate on the basis of race, color, national origin, age, disability, sex, sexual orientation, or gender identity.            After Visit Summary       This is your record. Keep this with you and show to your community pharmacist(s) and doctor(s) at your next visit.

## 2018-03-27 NOTE — ED PROVIDER NOTES
"  History     Chief Complaint   Patient presents with     confusion     per report confusion since last night, notes chronic pain     HPI  Seun Emanuel is a 72 year old male with Hx CVA, COPD, CKD, HTN, who presents via EMS for confusion starting last night. Seun has chronic statis ulcers with Hx cellulitis, positive culture for MRSA. His wife is here and reports decline in Yuliet mental status over the past 24 hrs and describes him as more confused. He has developed a cough, but is afebrile. She is concerned that MRSA is \"brewing\" as this is how he acts when he has an infection. He is again afebrile and his wife states his legs have been stable. They are using medihoney and dressing changes at home. Seun adds he is in pain and his neck and back hurt.     Problem List:    Patient Active Problem List    Diagnosis Date Noted     Chronic venous stasis dermatitis of both lower extremities 02/16/2018     Priority: Medium     Hypokalemia 12/17/2017     Priority: Medium     Factor V Leiden mutation (H) 12/17/2017     Priority: Medium     Hypoalbuminemia 12/17/2017     Priority: Medium     Benign essential hypertension 12/16/2017     Priority: Medium     Acute on chronic renal failure (H) 10/11/2016     Priority: Medium     Cellulitis of both lower extremities 07/23/2016     Priority: Medium     ACP (advance care planning) 05/07/2016     Priority: Medium     Advance Care Planning 5/7/2016: Receipt of ACP document:  Received: Health Care Directive which was witnessed or notarized on 4/18/16.  Document not previously scanned.  Validation form completed and sent with document to be scanned.  Code Status reflects choices in most recent ACP document. Recommend goals of care discussion with patient/decision makers and POLST/ updated Code status order as applicable to reflect patient choices.  Confirmed/documented designated decision maker(s).  Added by Chanel Archibald             Cellulitis 02/12/2016     Priority: Medium     Sepsis " (H) 01/28/2016     Priority: Medium     Delirium due to medical condition without behavioral disturbance 12/09/2015     Priority: Medium     Delirium 12/01/2015     Priority: Medium     Bacterial skin infection of leg 01/26/2015     Priority: Medium     COPD (chronic obstructive pulmonary disease) (H) 01/26/2015     Priority: Medium     Hip fracture (H) 01/26/2015     Priority: Medium     History of CVA (cerebrovascular accident) 01/26/2015     Priority: Medium     History of DVT (deep vein thrombosis) 01/26/2015     Priority: Medium     Hypertension 01/26/2015     Priority: Medium     Tobacco abuse 01/26/2015     Priority: Medium     Renal insufficiency 01/05/2015     Priority: Medium     MRSA infection 11/14/2014     Priority: Medium     Infection with methicillin-resistant Staphylococcus aureus 11/14/2014     Priority: Medium     Absolute anemia 07/13/2014     Priority: Medium     Chronic kidney disease (CKD), stage III (moderate) 07/13/2014     Priority: Medium     Hemiparesis due to old cerebrovascular accident (H) 06/01/2014     Priority: Medium     Adult hypothyroidism 06/01/2014     Priority: Medium     Noncompliance with medication regimen 06/01/2014     Priority: Medium     Hemiplegia (H) 06/01/2014     Priority: Medium     Bilateral lower leg cellulitis      Priority: Medium     Renal failure 02/09/2014     Priority: Medium     Hyperkalemia 02/09/2014     Priority: Medium     Tremor 02/09/2014     Priority: Medium     Protracted URI 10/27/2013     Priority: Medium        Past Medical History:    Past Medical History:   Diagnosis Date     Asthma      Bilateral lower leg cellulitis      Chronic venous insufficiency      CKD (chronic kidney disease), stage III      COPD (chronic obstructive pulmonary disease) (H)      CVA (cerebral vascular accident) (H)      DVT (deep venous thrombosis) (H)      Gout      Hypertension      Hypothyroidism      MRSA (methicillin resistant Staphylococcus aureus) infection       Osteoarthritis      Peripheral vascular disease (H)      Ulcers of both lower legs (H)        Past Surgical History:    Past Surgical History:   Procedure Laterality Date     AMPUTATION Left 03/07/2012    left great toe     CHOLECYSTECTOMY  1994     HIP SURGERY Left 01/28/2015     IRRIGATION AND DEBRIDEMENT LOWER EXTREMITY, COMBINED  2010    Both legs     TONSILLECTOMY         Family History:    Family History   Problem Relation Age of Onset     HEART DISEASE Father      CHF     Genitourinary Problems Father      Renal failure     HEART DISEASE Mother      CAD     Hypertension Father      CANCER Mother        Social History:  Marital Status:   [2]  Social History   Substance Use Topics     Smoking status: Current Every Day Smoker     Packs/day: 1.00     Years: 20.00     Smokeless tobacco: Never Used      Comment: Recently restarted     Alcohol use No        Medications:      ipratropium - albuterol 0.5 mg/2.5 mg/3 mL (DUONEB) 0.5-2.5 (3) MG/3ML neb solution   VITAMIN B COMPLEX-C CAPS   esomeprazole (NEXIUM) 40 MG CR capsule   fentaNYL (DURAGESIC) 75 mcg/hr 72 hr patch   fluticasone-salmeterol (ADVAIR DISKUS) 250-50 MCG/DOSE diskus inhaler   nicotine (NICODERM CQ) 21 MG/24HR 24 hr patch   saccharomyces boulardii (FLORASTOR) 250 MG capsule   Vancomycin (VANCOCIN) 100 mg/mL SOLR   potassium chloride 20 MEQ TBCR   warfarin (COUMADIN) 2.5 MG tablet   DIPHENHYDRAMINE HCL PO   triamcinolone (KENALOG) 0.1 % ointment   UNABLE TO FIND   multivitamin, therapeutic with minerals (MULTI-VITAMIN) TABS tablet   haloperidol (HALDOL) 5 MG tablet   amLODIPine (NORVASC) 10 MG tablet   RISPERIDONE PO   LOSARTAN POTASSIUM PO   OXYCODONE HCL PO   diphenhydrAMINE-zinc acetate (BENADRYL) cream   lidocaine (XYLOCAINE) 2 % jelly   order for DME   HYDROcodone-acetaminophen (NORCO)  MG per tablet   ZOLPIDEM TARTRATE PO   ASPIRIN PO   ACETAMINOPHEN PO   albuterol (2.5 MG/3ML) 0.083% nebulizer solution   DOCUSATE SODIUM PO    HYDROXYZINE HCL PO   lisinopril (PRINIVIL,ZESTRIL) 10 MG tablet   fluticasone (FLONASE) 50 MCG/ACT nasal spray   ORDER FOR DME   ORDER FOR DME   Levothyroxine Sodium (SYNTHROID PO)   COLCHICINE PO   ORDER FOR DME   METOPROLOL SUCCINATE ER PO   TORSEMIDE PO   BACLOFEN PO   Gabapentin (NEURONTIN PO)   ALBUTEROL INHALER 17GM         Review of Systems   Constitutional: Negative.    Respiratory: Negative.    Cardiovascular: Negative.    Musculoskeletal:        Worsening chronic pain   Skin: Positive for wound.   Neurological: Negative.    Psychiatric/Behavioral: Negative.        Physical Exam   BP: (!) 185/92  Pulse: 65  Heart Rate: 67  Temp: 97  F (36.1  C)  Resp: 16  SpO2: 96 %      Physical Exam   Constitutional: He is oriented to person, place, and time. No distress (chronically ill appearing male).   HENT:   Head: Normocephalic and atraumatic.   Mouth/Throat: Oropharynx is clear and moist.   Eyes: Conjunctivae and EOM are normal. Pupils are equal, round, and reactive to light.   Cardiovascular: Normal rate and normal heart sounds.    Pulmonary/Chest: Effort normal.   Diminished BS bilateral bases   Abdominal: Soft. Bowel sounds are normal. There is no tenderness.   Musculoskeletal:   Bilateral LEs with chronic stasis ulcers. No obvious cellulitis and left sided swelling is baseline per wife.   Neurological: He is alert and oriented to person, place, and time.   Contractures   Skin: Skin is warm and dry.   Psychiatric:   Pt is clear regarding pain and hunger. MS decrease per wife.    Nursing note and vitals reviewed.      ED Course     ED Course     Procedures      Results for orders placed or performed during the hospital encounter of 03/27/18 (from the past 24 hour(s))   CBC with platelets differential   Result Value Ref Range    WBC 9.4 4.0 - 11.0 10e9/L    RBC Count 5.32 4.4 - 5.9 10e12/L    Hemoglobin 14.8 13.3 - 17.7 g/dL    Hematocrit 45.3 40.0 - 53.0 %    MCV 85 78 - 100 fl    MCH 27.8 26.5 - 33.0 pg     MCHC 32.7 31.5 - 36.5 g/dL    RDW 13.7 10.0 - 15.0 %    Platelet Count 221 150 - 450 10e9/L    Diff Method Automated Method     % Neutrophils 72.1 %    % Lymphocytes 15.1 %    % Monocytes 8.2 %    % Eosinophils 3.8 %    % Basophils 0.4 %    % Immature Granulocytes 0.4 %    Nucleated RBCs 0 0 /100    Absolute Neutrophil 6.8 1.6 - 8.3 10e9/L    Absolute Lymphocytes 1.4 0.8 - 5.3 10e9/L    Absolute Monocytes 0.8 0.0 - 1.3 10e9/L    Absolute Eosinophils 0.4 0.0 - 0.7 10e9/L    Absolute Basophils 0.0 0.0 - 0.2 10e9/L    Abs Immature Granulocytes 0.0 0 - 0.4 10e9/L    Absolute Nucleated RBC 0.0    Comprehensive metabolic panel   Result Value Ref Range    Sodium 142 133 - 144 mmol/L    Potassium 3.6 3.4 - 5.3 mmol/L    Chloride 104 94 - 109 mmol/L    Carbon Dioxide 28 20 - 32 mmol/L    Anion Gap 10 3 - 14 mmol/L    Glucose 92 70 - 99 mg/dL    Urea Nitrogen 18 7 - 30 mg/dL    Creatinine 1.12 0.66 - 1.25 mg/dL    GFR Estimate 64 >60 mL/min/1.7m2    GFR Estimate If Black 78 >60 mL/min/1.7m2    Calcium 9.3 8.5 - 10.1 mg/dL    Bilirubin Total 0.6 0.2 - 1.3 mg/dL    Albumin 3.0 (L) 3.4 - 5.0 g/dL    Protein Total 7.1 6.8 - 8.8 g/dL    Alkaline Phosphatase 101 40 - 150 U/L    ALT 24 0 - 70 U/L    AST 16 0 - 45 U/L   Lactic acid   Result Value Ref Range    Lactic Acid 2.2 (H) 0.4 - 2.0 mmol/L   INR   Result Value Ref Range    INR 1.99 (H) 0.80 - 1.20   XR Chest 2 Views    Narrative    PROCEDURE:  XR CHEST 2 VW    HISTORY:  Fever; .     COMPARISON:  8/2/2016, 12/16/2017    FINDINGS:   The cardiac silhouette is normal in size. The pulmonary vasculature is  normal.  The lungs are clear. No pleural effusion or pneumothorax. The  patient's head obscures the right upper lung.      Impression    IMPRESSION:  No acute cardiopulmonary disease.      DELMI COOPER MD   CT Head w/o Contrast    Narrative    PROCEDURE: CT HEAD W/O CONTRAST   3/27/2018 2:44 PM    HISTORY:Male, age,  72 years, , , altered mental status;     COMPARISON: CT scan  of the brain 10/11/2016    TECHNIQUE: CT of the brain without contrast.    FINDINGS: Ventricles and sulci are mildly prominent in size and shape.  Gray and white matter demonstrate scattered areas of decreased  density. There is an area of decreased density extending from the deep  white matter into the cortex in the left frontal region that may be  partially artifactual related to bone/air interface of the left  frontal sinus.    Low dense material now occupies the right maxillary sinus..    There is no evidence of acute fracture.       Impression    IMPRESSION:   1.  No evidence of acute hemorrhage.    2.  Questionable focus of new left frontal cortical/subcortical  lesion. The cortical gray matter and subcortical white matter  demonstrate localized decrease in density, felt to be at least  partially related to artifact. The appearance is however concerning  for a new mass with vasogenic edema in the left frontal lobe. MRI  without with contrast would better characterize.    3.  Low dense material now fills the right maxillary sinus suggesting  mucous retention cyst or perhaps a dentigerous cyst arising from  posterior right maxillary molar.    MATTHEW DEL REAL MD   UA with Microscopic   Result Value Ref Range    Color Urine Yellow     Appearance Urine Clear     Glucose Urine Negative NEG^Negative mg/dL    Bilirubin Urine Negative NEG^Negative    Ketones Urine 40 (A) NEG^Negative mg/dL    Specific Gravity Urine 1.011 1.003 - 1.035    Blood Urine Trace (A) NEG^Negative    pH Urine 6.5 4.7 - 8.0 pH    Protein Albumin Urine 300 (A) NEG^Negative mg/dL    Urobilinogen mg/dL Normal 0.0 - 2.0 mg/dL    Nitrite Urine Negative NEG^Negative    Leukocyte Esterase Urine Negative NEG^Negative    Source Catheterized Urine     WBC Urine 2 0 - 5 /HPF    RBC Urine 8 (H) 0 - 2 /HPF    Bacteria Urine Few (A) NEG^Negative /HPF    Hyaline Casts 1 (A) OTO2^O - 2 /LPF       Medications   lactated ringers BOLUS 1,000 mL (0 mLs Intravenous  Stopped 3/27/18 7818)   fentaNYL (PF) (SUBLIMAZE) injection 50 mcg (50 mcg Intravenous Given 3/27/18 1400)       Assessments & Plan (with Medical Decision Making)     I have reviewed the nursing notes.  I have reviewed the findings, diagnosis, plan and need for follow up with the patient.    Discharge Medication List as of 3/27/2018  6:18 PM          Final diagnoses:   Altered mental status, unspecified altered mental status type   Abnormal CT scan, head   Unable to obtain IV access until arrival of anesthesia. Lactic acid is elevated and patient received 1 Liter with improved MS. CXR, labs and UA WNL. I did discuss the case with our hospitalist Dr Ponce and he advised MRI if available tonight due to the change vs artifact on CT as we have no infection to account for his symptoms and CT head negative for acute hemorrhage. We were unable to follow through with MRI due to time of day.  I did not recheck lactic acid as Yuliet wife is frustrated and requesting discharge to return home as we would have to admit for observation considering there is no obvious infection. SS was in to see patient and wife regarding options for skilled care and they prefer to return home. I strongly recommended close f/u with primary care to re-evaluate tomorrow, returning here sooner with onset fevers or concern for worsening skin changes. Wife is remains frustrated and requests transportation for Seun to return home.     3/27/2018   [facility]     Stu Echavarria, PA  03/27/18 8694

## 2018-03-27 NOTE — ED NOTES
Pt arrives via [facility] with report of hallucinations and increased all over body pain. Pt is yelling out throughout assessment.  Pt has BLE wounds to legs, with stasis changes and red, warmth.  Pt is able to answer some questions appropriately.  .

## 2018-03-27 NOTE — ED NOTES
Straight cath procedure performed to obtain urine sample. Patient tolerated well. RN X 2 at bedside. Sterile technique maintained. 400ml clear yellow urine returned.

## 2018-03-27 NOTE — ED NOTES
Multiple attempts by 3 CRNA's to place IV.  22 gauge iv placed to Rt inner forearm.  Pt tolerated fair.

## 2018-03-27 NOTE — ED AVS SNAPSHOT
[facility]    [address] [address]    Phone:  [phone]                                       Seun Emanuel   MRN: [id number]    Department:  [facility]   Date of Visit:  3/27/2018           After Visit Summary Signature Page     I have received my discharge instructions, and my questions have been answered. I have discussed any challenges I see with this plan with the nurse or doctor.    ..........................................................................................................................................  Patient/Patient Representative Signature      ..........................................................................................................................................  Patient Representative Print Name and Relationship to Patient    ..................................................               ................................................  Date                                            Time    ..........................................................................................................................................  Reviewed by Signature/Title    ...................................................              ..............................................  Date                                                            Time

## 2018-04-13 PROBLEM — L89.90 DECUBITUS ULCER: Status: ACTIVE | Noted: 2018-01-01

## 2018-04-13 PROBLEM — L98.493: Status: ACTIVE | Noted: 2018-01-01

## 2018-04-13 PROBLEM — L08.9: Status: ACTIVE | Noted: 2018-01-01

## 2018-04-13 NOTE — IP AVS SNAPSHOT
MRN:[id number]                      After Visit Summary   4/13/2018    Seun Emanuel    MRN: [id number]           Thank you!     Thank you for choosing [facility] for your care. Our goal is always to provide you with excellent care. Hearing back from our patients is one way we can continue to improve our services. Please take a few minutes to complete the written survey that you may receive in the mail after you visit with us. Thank you!        Patient Information     Date Of Birth          1945        Designated Caregiver       Most Recent Value    Caregiver    Will someone help with your care after discharge? yes    Name of designated caregiver Kimmy (wife)    Phone number of caregiver [phone]    Caregiver address see facesheet      About your hospital stay     You were admitted on:  April 13, 2018 You last received care in the:  [facility]    You were discharged on:  April 18, 2018        Reason for your hospital stay       Mr. Emanuel is a 72-year-old man with a previous history of a CVA; almost nonambulatory. He has had a lot of LE chronic venous stasis changes and recurrent bouts of cellulitis.  Wife brought him to emergency department because of worsening left gluteal decubitus ulcer, fevers, and confusion.  He underwent surgical incisional debridement of his ulcer.  Deep cultures showed multiple organisms.  Space he was treated with interest in about treatment difficult particularly with limited IV access.  On discussion with him and his wife agreed to return home redirecting goals of therapy to comfort in the hopes that wound may improve with local measures but acknowledged that unfortunately he will not survive long.  He has been evaluated by hospice team prior to discharge.                  Who to Call     For medical emergencies, please call [phone].  For non-urgent questions about your medical care, please call your primary care provider or clinic, [phone]  For  questions related to your surgery, please call your surgery clinic        Attending Provider     Provider Specialty    Carla Haile MD Family Practice    Arpit Ponce MD Internal Medicine    Ai Nunez, NP Nurse Practitioner    Ever Bennett MD Internal Medicine       Primary Care Provider Office Phone # Fax #    Herbie Peter [phone]      After Care Instructions     Activity       Your activity upon discharge: activity as tolerated            Diet       Follow this diet upon discharge: Orders Placed This Encounter      Regular Diet Adult                  Follow-up Appointments     Follow-up and recommended labs and tests        Follow up with primary care provider, Herbie Peter, or hospice physician Dr Tejada as needed.                  Additional Services     Home care nursing referral       RN skilled nursing visit. RN to provide hospice teaching and care.    Your provider has ordered home care nursing services. If you have not been contacted within 2 days of your discharge please call the inpatient department phone number at [phone] .                  Future tests that were ordered for you     INR       May discontinue as indicated by hospice team                  Further instructions from your care team       Daily dressing change as follows to BLE's:  Cleansed legs with mild soap, air dry. Cover any draining areas with 4x4 dry gauze. Apply kerlix to LE's, ensure it is not too tight.    Dressing changes for open wound to L-buttocks- twice daily, if soiled with stool it needs to be changed sooner.  Use saline moistened kerlex roll (approx 1/2-2/3 roll) with dry gauze and  ABD pad over the top.    Hospice will be to your home between 12:30 and 1:00 pm today.    Warfarin Instruction     You have started taking a medicine called warfarin. This is a blood-thinning medicine (anticoagulant). It helps prevent and treat blood clots.      Before leaving the  "hospital, make sure you know how much to take and how long to take it.      You will need regular blood tests to make sure your blood is clotting safely. It is very important to see your doctor for regular blood tests.    Talk to your doctor before taking any new medicine (this includes over-the-counter drugs and herbal products). Many medicines can interact with warfarin. This may cause more bleeding or too much clotting.     Eating a lot of vitamin K--found in green, leafy vegetables--can change the way warfarin works in your body. Do NOT avoid these foods. Instead, try to eat the same amount each day.     Bleeding is the most common side-effect of warfarin. You may notice bleeding gums, a bloody nose, bruises and bleeding longer when you cut yourself. See a doctor at once if:   o You cough up blood  o You find blood in your stool (poop)  o You have a deep cut, or a cut that bleeds longer than 10 minutes   o You have a bad cut, hard fall, accident or hit your head (go to urgent care or the emergency room).    For women who can get pregnant: This medicine can harm an unborn baby. Be very careful not to get pregnant while taking this medicine. If you think you might be pregnant, call your doctor right away.    For more information, read \"Guide to Warfarin Therapy,  the booklet you received in the hospital.        Pending Results     Date and Time Order Name Status Description    4/16/2018 0000 Blood culture Preliminary     4/16/2018 0000 Blood culture Preliminary     4/14/2018 1025 Tissue Culture Aerobic Bacterial Preliminary     4/14/2018 1025 Anaerobic bacterial culture Preliminary     4/13/2018 0858 Blood culture Preliminary             Statement of Approval     Ordered          04/18/18 1012  I have reviewed and agree with all the recommendations and orders detailed in this document.  EFFECTIVE NOW     Approved and electronically signed by:  Ever Bennett MD             Admission Information     Date & " "Time Provider Department Dept. Phone    2018 Ever Bennett MD [facility] [phone]      Your Vitals Were     Blood Pressure Pulse Temperature Respirations Height Weight    191/76 (BP Location: Left arm) 70 99.8  F (37.7  C) (Tympanic) 18 1.803 m (5' 11\") 81.3 kg (179 lb 3.7 oz)    Pulse Oximetry BMI (Body Mass Index)                94% 25 kg/m2          [employer] Information     [employer] lets you send messages to your doctor, view your test results, renew your prescriptions, schedule appointments and more. To sign up, go to [email or web address] . Click on \"Log in\" on the left side of the screen, which will take you to the Welcome page. Then click on \"Sign up Now\" on the right side of the page.     You will be asked to enter the access code listed below, as well as some personal information. Please follow the directions to create your username and password.     Your access code is: [email or web address]  Expires: 2018  1:33 PM     Your access code will  in 90 days. If you need help or a new code, please call your [facility] clinic or [phone].        Care EveryWhere ID     This is your Care EveryWhere ID. This could be used by other organizations to access your [facility] medical records  [id number]        Equal Access to Services     [facility]ESS[facility] : Hadii jason agarwal hadpetero Socorrine, waaxda luqadaha, qaybta kaalmada dalton, michelle blue . So [facility] [phone].    ATENCIÓN: Si habla español, tiene a martinez disposición servicios gratuitos de asistencia lingüística. Aidan al [phone].    We comply with applicable federal civil rights laws and [place] laws. We do not discriminate on the basis of race, color, national origin, age, disability, sex, sexual orientation, or gender identity.               Review of your medicines      CONTINUE these medicines which may have CHANGED, or have new prescriptions. If we are uncertain of the size of tablets/capsules you have at " home, strength may be listed as something that might have changed.        Dose / Directions    amLODIPine 10 MG tablet   Commonly known as:  NORVASC   This may have changed:  how much to take   Used for:  Benign essential hypertension        Dose:  10 mg   Take 1 tablet (10 mg) by mouth daily   Quantity:  30 tablet   Refills:  1       warfarin 2 MG tablet   Commonly known as:  COUMADIN   This may have changed:    - medication strength  - additional instructions   Used for:  Confusion associated with infection        2 mg daily   Quantity:  30 tablet   Refills:  1         CONTINUE these medicines which have NOT CHANGED        Dose / Directions    ACETAMINOPHEN PO        Dose:  500 mg   Take 500 mg by mouth every 6 hours as needed for pain   Refills:  0       ADVAIR DISKUS 250-50 MCG/DOSE diskus inhaler   Generic drug:  fluticasone-salmeterol        Dose:  1 puff   Inhale 1 puff into the lungs 2 times daily   Refills:  0       * ALBUTEROL INHALER 17GM        Dose:  2 puff   Inhale 2 puffs into the lungs every 4 hours as needed This product no longer available   Refills:  0       * albuterol (2.5 MG/3ML) 0.083% neb solution        Dose:  1 vial   Take 1 vial by nebulization every 2 hours as needed for shortness of breath / dyspnea or wheezing   Refills:  0       ASPIRIN PO        Dose:  81 mg   Take 81 mg by mouth daily   Refills:  0       BACLOFEN PO        Dose:  10 mg   Take 10 mg by mouth 3 times daily   Refills:  0       COLCHICINE PO        Dose:  0.6 mg   Take 0.6 mg by mouth daily as needed   Refills:  0       DIPHENHYDRAMINE HCL PO        Dose:  25 mg   Take 25 mg by mouth One to two tabs prn itching   Refills:  0       diphenhydrAMINE-zinc acetate cream   Commonly known as:  BENADRYL        Dose:  2 g   Apply 2 g topically 3 times daily as needed for itching (for itching legs)   Refills:  0       DOCUSATE SODIUM PO   Indication:  1-2 Tabs        Dose:  100 mg   Take 100 mg by mouth 2 times daily   Refills:  0        esomeprazole 40 MG CR capsule   Commonly known as:  nexIUM        Dose:  40 mg   Take 40 mg by mouth daily with food   Refills:  0       fentaNYL 75 mcg/hr 72 hr patch   Commonly known as:  DURAGESIC        Dose:  1 patch   Apply 1 patch topically daily   Refills:  0       FLORASTOR 250 MG capsule   Generic drug:  saccharomyces boulardii        Dose:  250 mg   Take 250 mg by mouth 2 times daily   Refills:  0       fluticasone 50 MCG/ACT spray   Commonly known as:  FLONASE        Dose:  1-2 spray   Spray 1-2 sprays into both nostrils daily   Quantity:  1 Package   Refills:  0       haloperidol 5 MG tablet   Commonly known as:  HALDOL        Dose:  5 mg   Take 5 mg by mouth every 8 hours as needed for agitation   Refills:  0       HYDROcodone-acetaminophen  MG per tablet   Commonly known as:  NORCO   Used for:  Cellulitis and abscess of leg        Dose:  1 tablet   Take 1 tablet by mouth every 6 hours as needed for moderate to severe pain   Quantity:  60 tablet   Refills:  0       HYDROXYZINE HCL PO        Dose:  50 mg   Take 50 mg by mouth 3 times daily as needed for itching   Refills:  0       ipratropium - albuterol 0.5 mg/2.5 mg/3 mL 0.5-2.5 (3) MG/3ML neb solution   Commonly known as:  DUONEB        Inhale into the lungs every 6 hours as needed   Refills:  0       lidocaine 2 % topical gel   Commonly known as:  XYLOCAINE        Dose:  1 Tube   Apply 1 Tube topically as needed for moderate pain (apply to itching legs 3-4 times daily)   Refills:  0       LOSARTAN POTASSIUM PO        Dose:  100 mg   Take 100 mg by mouth daily   Refills:  0       METOPROLOL SUCCINATE ER PO        Dose:  100 mg   Take 100 mg by mouth daily   Refills:  0       Multi-vitamin Tabs tablet        Dose:  1 tablet   Take 1 tablet by mouth daily   Refills:  0       NEURONTIN PO        Dose:  100 mg   Take 100 mg by mouth 3 times daily   Refills:  0       nicotine 21 MG/24HR 24 hr patch   Commonly known as:  NICODERM CQ         Dose:  1 patch   1 patch daily   Refills:  0       * order for DME   Used for:  COPD exacerbation (H)        Equipment being ordered: Other: *Neb machine** Treatment Diagnosis: *COPD**   Quantity:  1 Device   Refills:  0       * order for DME   Used for:  Cellulitis, Ulcers of both lower legs, with unspecified severity        Equipment being ordered: Bed Cradle   Quantity:  1 Device   Refills:  0       * order for DME   Used for:  Ulcers of both lower legs, limited to breakdown of skin (H)        Equipment being ordered: Other: **Rook boots for chronic stasis dermatitis and ulcers.* Treatment Diagnosis: *CKD3 , chronic edema with ulcers**   Quantity:  1 Package   Refills:  2       * order for DME   Used for:  Hemiparesis due to old cerebrovascular accident (H), Arthritis of left shoulder region        Equipment being ordered: Other: rollo cushion and arm sling Treatment Diagnosis: stage 2 pressure sores and left should pain and OA of ac joint   Quantity:  1 Device   Refills:  0       OXYCODONE HCL PO   Indication:  10-15 mg every 8 hours as needed        Dose:  15 mg   Take 15 mg by mouth every 8 hours as needed   Refills:  0       Potassium Chloride ER 20 MEQ Tbcr   Used for:  Hypokalemia        Dose:  20 mEq   Take 1 tablet (20 mEq) by mouth daily   Quantity:  30 tablet   Refills:  1       RISPERIDONE PO        Dose:  0.25 mg   Take 0.25 mg by mouth   Refills:  0       SEROQUEL 25 MG tablet   Generic drug:  QUEtiapine        Dose:  25 mg   Take 25 mg by mouth every 4 hours as needed   Refills:  0       SYNTHROID PO        Dose:  125 mcg   Take 125 mcg by mouth daily   Refills:  0       TORSEMIDE PO        Dose:  20 mg   Take 20 mg by mouth daily   Refills:  0       triamcinolone 0.1 % ointment   Commonly known as:  KENALOG        Apply topically 2 times daily as needed for irritation   Refills:  0       UNABLE TO FIND        MEDICATION NAME: Phenylephrine-DM-acetaminophen Bid PRN   Refills:  0       ZOLPIDEM  TARTRATE PO        Dose:  10 mg   Take 10 mg by mouth nightly as needed   Refills:  0       * Notice:  This list has 6 medication(s) that are the same as other medications prescribed for you. Read the directions carefully, and ask your doctor or other care provider to review them with you.      STOP taking     lisinopril 10 MG tablet   Commonly known as:  PRINIVIL/ZESTRIL                Where to get your medicines      These medications were sent to [id number] - [place] - [address]  [address] [place] [address]     Phone:  [phone]     amLODIPine 10 MG tablet    warfarin 2 MG tablet                Protect others around you: Learn how to safely use, store and throw away your medicines at www.disposemymeds.org.             Medication List: This is a list of all your medications and when to take them. Check marks below indicate your daily home schedule. Keep this list as a reference.      Medications           Morning Afternoon Evening Bedtime As Needed    ACETAMINOPHEN PO   Take 500 mg by mouth every 6 hours as needed for pain                                ADVAIR DISKUS 250-50 MCG/DOSE diskus inhaler   Inhale 1 puff into the lungs 2 times daily   Generic drug:  fluticasone-salmeterol                                * ALBUTEROL INHALER 17GM   Inhale 2 puffs into the lungs every 4 hours as needed This product no longer available                                * albuterol (2.5 MG/3ML) 0.083% neb solution   Take 1 vial by nebulization every 2 hours as needed for shortness of breath / dyspnea or wheezing                                amLODIPine 10 MG tablet   Commonly known as:  NORVASC   Take 1 tablet (10 mg) by mouth daily   Last time this was given:  5 mg on 4/18/2018  8:37 AM                                ASPIRIN PO   Take 81 mg by mouth daily                                BACLOFEN PO   Take 10 mg by mouth 3 times daily   Last time this was given:  10 mg on 4/18/2018  8:37 AM                                 COLCHICINE PO   Take 0.6 mg by mouth daily as needed                                DIPHENHYDRAMINE HCL PO   Take 25 mg by mouth One to two tabs prn itching                                diphenhydrAMINE-zinc acetate cream   Commonly known as:  BENADRYL   Apply 2 g topically 3 times daily as needed for itching (for itching legs)                                DOCUSATE SODIUM PO   Take 100 mg by mouth 2 times daily                                esomeprazole 40 MG CR capsule   Commonly known as:  nexIUM   Take 40 mg by mouth daily with food                                fentaNYL 75 mcg/hr 72 hr patch   Commonly known as:  DURAGESIC   Apply 1 patch topically daily                                FLORASTOR 250 MG capsule   Take 250 mg by mouth 2 times daily   Generic drug:  saccharomyces boulardii                                fluticasone 50 MCG/ACT spray   Commonly known as:  FLONASE   Spray 1-2 sprays into both nostrils daily                                haloperidol 5 MG tablet   Commonly known as:  HALDOL   Take 5 mg by mouth every 8 hours as needed for agitation   Last time this was given:  5 mg on 4/18/2018  2:12 AM                                HYDROcodone-acetaminophen  MG per tablet   Commonly known as:  NORCO   Take 1 tablet by mouth every 6 hours as needed for moderate to severe pain   Last time this was given:  1 tablet on 4/18/2018  7:58 AM                                HYDROXYZINE HCL PO   Take 50 mg by mouth 3 times daily as needed for itching                                ipratropium - albuterol 0.5 mg/2.5 mg/3 mL 0.5-2.5 (3) MG/3ML neb solution   Commonly known as:  DUONEB   Inhale into the lungs every 6 hours as needed   Last time this was given:  3 mLs on 4/15/2018  7:31 PM                                lidocaine 2 % topical gel   Commonly known as:  XYLOCAINE   Apply 1 Tube topically as needed for moderate pain (apply to itching legs 3-4 times daily)                                 LOSARTAN POTASSIUM PO   Take 100 mg by mouth daily   Last time this was given:  100 mg on 4/18/2018  8:37 AM                                METOPROLOL SUCCINATE ER PO   Take 100 mg by mouth daily   Last time this was given:  100 mg on 4/18/2018  8:37 AM                                Multi-vitamin Tabs tablet   Take 1 tablet by mouth daily                                NEURONTIN PO   Take 100 mg by mouth 3 times daily   Last time this was given:  100 mg on 4/18/2018  8:37 AM                                nicotine 21 MG/24HR 24 hr patch   Commonly known as:  NICODERM CQ   1 patch daily   Last time this was given:  1 patch on 4/17/2018  2:12 PM                                * order for DME   Equipment being ordered: Other: *Neb machine** Treatment Diagnosis: *COPD**                                * order for DME   Equipment being ordered: Bed Cradle                                * order for DME   Equipment being ordered: Other: **Rook boots for chronic stasis dermatitis and ulcers.* Treatment Diagnosis: *CKD3 , chronic edema with ulcers**                                * order for DME   Equipment being ordered: Other: rollo cushion and arm sling Treatment Diagnosis: stage 2 pressure sores and left should pain and OA of ac joint                                OXYCODONE HCL PO   Take 15 mg by mouth every 8 hours as needed                                Potassium Chloride ER 20 MEQ Tbcr   Take 1 tablet (20 mEq) by mouth daily   Last time this was given:  40 mEq on 4/15/2018 11:25 AM                                RISPERIDONE PO   Take 0.25 mg by mouth                                SEROQUEL 25 MG tablet   Take 25 mg by mouth every 4 hours as needed   Generic drug:  QUEtiapine                                SYNTHROID PO   Take 125 mcg by mouth daily   Last time this was given:  125 mcg on 4/18/2018  6:22 AM                                TORSEMIDE PO   Take 20 mg by mouth daily                                 triamcinolone 0.1 % ointment   Commonly known as:  KENALOG   Apply topically 2 times daily as needed for irritation                                UNABLE TO FIND   MEDICATION NAME: Phenylephrine-DM-acetaminophen Bid PRN   Last time this was given:  4/18/2018  5:07 AM                                warfarin 2 MG tablet   Commonly known as:  COUMADIN   2 mg daily   Last time this was given:  2.5 mg on 4/17/2018  8:08 PM                                ZOLPIDEM TARTRATE PO   Take 10 mg by mouth nightly as needed                                * Notice:  This list has 6 medication(s) that are the same as other medications prescribed for you. Read the directions carefully, and ask your doctor or other care provider to review them with you.              More Information        Reducing a Patient s Risk for Pressure Injuries  There is no single preventive for pressure injuries. Give priority to pressure relief. Reduce other risks to help maintain a healthy flow of nutrients to the patient s skin.  Promote good nutrition  Cells regenerate more quickly when nutrition is supplemented. To identify malnutrition, weigh the patient and review blood test results. Consult with a dietitian and doctor about any dietary adjustments:    Feed the patient more frequently.    Order foods high in protein and calories.    Give vitamin supplements.    Use tube feedings, if necessary.   Relieve pressure  Relieving pressure is the single most important factor in preventing and treating pressure injuries.  You can relieve pressure and restore the skin s blood supply by repositioning the patient and using special devices. Post a schedule to remind you to reposition the patient -- from side to back or from stomach to side. Make minor position changes even more frequently. Specially designed pillows, beds, or mattresses can also help reduce pressure.  In a bed    Use pillows under calves to elevate the legs from above the knees to the  ankles.    Alter the angles of arms and legs.  In a wheelchair    Cushion the back and buttocks with pillows or wheelchair cushions, and pad the footrest.    Use a special wheelchair cushion that is designed to distribute weight evenly and relieve pressure points.    Have special cushions tested and adjusted at the proper times as recommended by the . This will allow the pressure relief to remain effective.   Manage moisture  Keep skin clean and lubricated, but free of excess moisture. Put the patient on a regular toilet schedule. Use incontinent devices, if appropriate. Consult with a doctor about using diarrhea medicines:    Use talc-free powders or barrier creams.    Place towels between skin folds.    Pat skin dry after bathing.    Lubricate skin with lotion.   Reduce shear and friction  Prevent skin breakdown by reducing friction and shear. Patients are less likely to slide down in bed if they re supported by pillows and the head of the bed isn t raised too high. During bed or wheelchair transfers, lift--don t drag--the patient. If you can t do this alone, get help. And be sure to use assistive devices, whenever possible.  In a bed    Use draw-sheets or transfer boards to move patients.    Clean and smooth the bed surface.    Lift the head of the bed no more than 30 .    Raise the foot of the bed slightly.  In a wheelchair    Support the patient s back with a pillow.    Use a foot extension.  Date Last Reviewed: 1/24/2016 2000-2017 [employer]. [address] [place] [address], [place] [address]. All rights reserved. This information is not intended as a substitute for professional medical care. Always follow your healthcare professional's instructions.                Discharge Instructions for Pressure Injury  You have been diagnosed with a pressure injury. This is also called a bedsore or decubitus ulcer. This is a breakdown of skin and tissue. Pressure injuries can happen when you are  unable to change position, or when you must stay in a bed or a chair for a long time. Here s what you can do to prevent, watch for, and help heal these injuries.  Prevent pressure injuries    Check your body daily for any signs of skin redness or open wounds.    Turn or change your position at least every 2 hours. If you can t move yourself, ask someone to help you move.    If you spend a lot of time in a chair, reposition yourself every 15 minutes. If you can't move yourself, have someone move you at least once an hour.    Ask about special products, such as mattresses and chair cushions, that can help reduce pressure on your skin.    Avoid doughnut-shaped cushions or any cushion that does not support you completely.    Exercise your body to stay as flexible as possible. Tense and relax your muscles. Wiggle your fingers and toes. Rotate your wrists and ankles. Get help if you can t do this for yourself.    If needed, have a family member, friend, or caregiver bend and straighten your arms and legs every day to keep you from getting stiff.    Keep your skin clean and moisturized. Ask your healthcare provider about products that clean and protect the skin.    Eat a healthy diet and drink plenty of water to stay hydrated. If your provider recommends it, see a dietitian for help.  Watch for signs of pressure injuries  You are at greater risk for pressure injuries if you have any of the following:    Body areas with little or no feeling (sensation)    Body areas that have nonstop pressure because of positioning or assistive devices    Any new or repeated injury to the skin    A reddened or darkened area of skin that does not go away within 30 minutes of easing pressure on that site by moving and changing your position    Skin cracks, blisters, peels, or breaks in the skin    Open skin that oozes or drains  Help yourself heal    Keep pressure off the sore and the area around it. If the sore is on your back, try lying on  your side or stomach. Pressure wounds will never heal unless the pressure against the wound is relieved. You must be careful to keep pressure off these areas at all times.    Keep the sore clean. Protect it from urine, stool, and other irritating or infectious things.    Don t massage the area around the pressure injury. This can cause extra tissue damage. Also, don t massage any of the bony parts of your body. These are areas where the bone lies right under your skin and pushes against your skin.    Don t touch or try to remove scabs without medical help. Talk to your provider about products that help pressure injuries heal. There are also products that protect the area from infection and protect the skin around the sore.  Follow-up  Small, shallow sores may heal without any problems. More serious pressure injuries need close follow-up. They are more likely to get worse quickly and to become infected. Make a follow-up appointment as directed by your provider.  When to call your provider  Call your provider right away if you have any of the following:    Fever of 100.4 F (38 C) or higher, or as directed by your healthcare provider. If you have chills, take your temperature.    Pus, bloody drainage, or odor from the sore    Redness or swelling around the sore    Dizziness, lightheadedness, or fainting    Exposed bone or other deeper structures in the sore   Date Last Reviewed: 7/1/2017 2000-2017 [employer]. [address] [place] [id number]. All rights reserved. This information is not intended as a substitute for professional medical care. Always follow your healthcare professional's instructions.                Starting Hospice  If you are thinking about hospice, you know that the decision can be a difficult one. It means that you or your loved one is nearing the end of life. It is normal to feel very emotional when hospice is discussed. You or your loved one s healthcare providers can  help guide you in making the decision. But keep in mind that the goal of hospice is to provide comfort. This is done for both the person who is ill and his or her family. Hospice is about ensuring quality of life during the time a person has left. Emotional and practical matters are taken care of while symptoms are controlled.     When choosing a hospice, look at the types of services they offer.   Beginning the hospice process  A healthcare provider must confirm that you or your loved one qualifies for hospice. A person with advanced cancer, heart failure, severe lung disease, or other illness can go into hospice when a health care provider believes he or she has about 6 months or less to live. You can then choose a hospice to make this time as comfortable as possible.  Choosing a hospice  When you are looking at hospices, ask questions. What are their services? Where do they provide care--at home or in a facility? Ask for a copy of the hospice s Patient s Rights and Responsibilities. To learn more about your local hospices, contact:    Healthcare providers or hospital staff    Your place of [facility]    The local agency on aging    The local Visiting Nurse Association     Your local [facility]    The [place] s Health Administration office    [facility] department of health or     The state hospice organization    The Hospice Foundation of [place]     The National Hospice and Palliative Care Organization   The hospice team  Hospice is provided by a team. The team usually has a healthcare provider, nurse, and  trained in hospice care. It may also have a home health aide, spiritual counselor, volunteers, and others. The people receiving treatment are an important part of the team. They can voice their wishes and goals. If hospice is done at home, family members give day-to-day care. A hospice aide can make several visits each week to help with bathing and bathroom needs. The number of visits made  by the hospice aide is decided by the hospice team and depends on the needs of each client. A nurse, , and other professionals will also visit. A hospice nurse or healthcare provider is on call 24 hours a day to answer questions and handle problems. Ask your healthcare provider if he or she participates in a hospice program if you wish to stay under his or her care while enrolled in a hospice program. If he or she does not participate, your hospice care can be managed by the hospice program's medical director.   Preparing for care at home  Hospice is often done in the home. Family members are the main caregivers, with support from the hospice team. The team may help you arrange care. They can provide medical equipment as needed. This may include a hospital bed, commode, oxygen, or other supplies. The hospice team can also help the family get breaks from caregiving. This is called respite care. For a short period of time, the person receiving hospice care can be put into a facility. This lets caregivers take care of other needs. If hospice is already being done in a facility, all of these things are taken care of on-site.  Physical care  Symptoms are treated. These include pain, nausea, anxiety, breathing distress, and sleep problems. The hospice program will provide medicines to ease these symptoms. Treatments that are no longer helping may be stopped. The team will discuss what treatment changes may be needed. Caregiver training may be given to family members.  Emotional care  Both the person receiving treatment and family members can get counseling. This is to help with anxiety, grief, family conflict, and spiritual issues. Bereavement support can continue up to 1 year after the person dies  Practical matters  The hospice team helps the person receiving treatment and family members understand the illness and how it progresses. They can help both the person and family members review choices so  decisions can be made. The team can help with finding legal resources and answering insurance questions. And they give information about how to make  and memorial arrangements.  Stopping hospice  The person s primary care provider will have contact with the hospice team on a regular basis. If the person s health improves, he or she may no longer meet the terms for hospice or need hospice care. In this case, the person can end the hospice care and start it again later as needed. A person can go back to hospice at any time by being recertified by a healthcare provider. Also, a person has the right to leave a hospice at any time for any reason. A person can also change to a different hospice if he or she is not happy with the care.  The cost of hospice care  Medicare, Medicaid (in some states), most private insurance groups, and HMOs cover hospice care. Families may be asked to meet some costs that are not covered by insurance. Public and community support through donations, grants, memorial gifts, and fundraising events make it possible for hospices to cover the cost of care. People are generally not turned away for financial reasons.  Date Last Reviewed: 3/1/2017    6108-4608 [employer]. [address] [place] [address], [place] [address]. All rights reserved. This information is not intended as a substitute for professional medical care. Always follow your healthcare professional's instructions.

## 2018-04-13 NOTE — CONSULTS
[facility] Surgery Consultation    CC:  Fevers, confusion     HPI:  This 72 year old year old male is seen at the request of Carla Haile for evaluation of Sacral decubitus ulcer. He is unable to give his own history do to a stroke. Per wife she notes that he started digging at his left buttock area several days ago, digging out chunks of flesh and bloody discharge. He is on coumadin for a DVT, though has not been taking for a week do to it being elevated to > 5 earlier this past week. He has a PCA come visit him at home though it seems more related to his significant lower leg ulcerations. He was noted to have fever to 103 last night at home which prompted him to come in to be evaluated.     Past Medical History:   Diagnosis Date     Asthma      Bilateral lower leg cellulitis      Chronic venous insufficiency      CKD (chronic kidney disease), stage III      COPD (chronic obstructive pulmonary disease) (H)      CVA (cerebral vascular accident) (H)      DVT (deep venous thrombosis) (H)      Gout      Hypertension      Hypothyroidism      MRSA (methicillin resistant Staphylococcus aureus) infection      Osteoarthritis      Peripheral vascular disease (H)      Ulcers of both lower legs (H)        Past Surgical History:   Procedure Laterality Date     AMPUTATION Left 03/07/2012    left great toe     CHOLECYSTECTOMY  1994     HIP SURGERY Left 01/28/2015     IRRIGATION AND DEBRIDEMENT LOWER EXTREMITY, COMBINED  2010    Both legs     TONSILLECTOMY         Allergies   Allergen Reactions     Bactroban [Mupirocin]      Furosemide Unknown     Takes torsemide at home  Unknown     Indapamide      Lasix      Takes torsemide at home  Unknown       Sulfa Drugs      Vomiting, can't breathe       Sulfasalazine Nausea and Vomiting     Vomiting, can't breathe       Current Facility-Administered Medications   Medication     piperacillin-tazobactam (ZOSYN) infusion 3.375 g     HOLD: warfarin (COUMADIN) therapy     phytonadione  (AQUA-MEPHYTON) 2 mg in sodium chloride 0.9 % 50 mL intermittent infusion     Current Outpatient Prescriptions   Medication     ipratropium - albuterol 0.5 mg/2.5 mg/3 mL (DUONEB) 0.5-2.5 (3) MG/3ML neb solution     VITAMIN B COMPLEX-C CAPS     esomeprazole (NEXIUM) 40 MG CR capsule     fentaNYL (DURAGESIC) 75 mcg/hr 72 hr patch     fluticasone-salmeterol (ADVAIR DISKUS) 250-50 MCG/DOSE diskus inhaler     nicotine (NICODERM CQ) 21 MG/24HR 24 hr patch     saccharomyces boulardii (FLORASTOR) 250 MG capsule     Vancomycin (VANCOCIN) 100 mg/mL SOLR     potassium chloride 20 MEQ TBCR     warfarin (COUMADIN) 2.5 MG tablet     DIPHENHYDRAMINE HCL PO     triamcinolone (KENALOG) 0.1 % ointment     UNABLE TO FIND     multivitamin, therapeutic with minerals (MULTI-VITAMIN) TABS tablet     haloperidol (HALDOL) 5 MG tablet     amLODIPine (NORVASC) 10 MG tablet     RISPERIDONE PO     LOSARTAN POTASSIUM PO     OXYCODONE HCL PO     diphenhydrAMINE-zinc acetate (BENADRYL) cream     lidocaine (XYLOCAINE) 2 % jelly     order for DME     HYDROcodone-acetaminophen (NORCO)  MG per tablet     ZOLPIDEM TARTRATE PO     ASPIRIN PO     ACETAMINOPHEN PO     albuterol (2.5 MG/3ML) 0.083% nebulizer solution     DOCUSATE SODIUM PO     HYDROXYZINE HCL PO     lisinopril (PRINIVIL,ZESTRIL) 10 MG tablet     fluticasone (FLONASE) 50 MCG/ACT nasal spray     ORDER FOR DME     ORDER FOR DME     Levothyroxine Sodium (SYNTHROID PO)     COLCHICINE PO     ORDER FOR DME     METOPROLOL SUCCINATE ER PO     TORSEMIDE PO     BACLOFEN PO     Gabapentin (NEURONTIN PO)     ALBUTEROL INHALER 17GM       HABITS:    Social History   Substance Use Topics     Smoking status: Current Every Day Smoker     Packs/day: 1.00     Years: 20.00     Smokeless tobacco: Never Used      Comment: Recently restarted     Alcohol use No       Family History   Problem Relation Age of Onset     HEART DISEASE Father      CHF     Genitourinary Problems Father      Renal failure      HEART DISEASE Mother      CAD     Hypertension Father      CANCER Mother        REVIEW OF SYSTEMS:  Ten point review of systems negative except those mentioned in the HPI.     OBJECTIVE:    /76  Pulse 76  Temp 97.9  F (36.6  C) (Tympanic)  Resp 17  SpO2 92%    GENERAL: Disheveled appearance.  HEENT:   Sclerae anicteric - No cervical, supra/infraclavciular lymphadenopathy, R  espiratory:  No acute distress, no splinting   Cardiovascular:  Regular Rate and Rhythm  Abdomen: non-distended   :  deferred  Extremities:  Significant lichenification of bilateral lower extremities with some open areas, missing left great toe.   Skin: Left ischial ulcer with black necrosis around edge, foul smelling,   Neurological: bed bound, unable to answer questions appropriately   Psych: alert     IMPRESSION:    72 y.o. With multiple medical co-morbid conditions including CVA, CKD, and lichenification of bilateral lower extremities  presents to the ED with fevers and more confused, concern for severe sepsis with sacral decubitus ulcer as potential/likely source. Of note the patient is immobile and has severe venous stasis disease with lichenification of bilateral lower limbs. As the patient is no longer mobile I discussed with wife(POA) that this is a likely source of infection for the future and may benefit from bilateral AKA. I also discussed the possibility of fecal diversion. These all seemed to be ideas that she is not ready to move forward on, so discussed that we need to get control of his necrotic wound and will do so once his INR is reversed and he is adequately resuscitated.       PLAN:    Debridement of sacral decubitus ulcer, this evening or tomorrow am.   Defer to IM for most appropriate reversal of INR.      Sampson Mitchell MD,     4/13/2018  9:39 AM

## 2018-04-13 NOTE — PLAN OF CARE
Problem: Patient Care Overview  Goal: Plan of Care/Patient Progress Review  Outcome: No Change  VSS, BP elevated 183/80 r/t not taking any of his typical medications this morning. Lungs clear but diminished throughout. Pt Alert to person, place and month but not why he is admitted to the hospital. Chronic leg venous stasis dermatitis remains very dry thick scaly and flaking with petechia to toes. LLE is visibly larger than RLE, toes are cool to the touch. 5cm x 5cm unstageable pressure ulcer noted under left buttock cleft, wound is open, black with tunneling and foul odor, draining brown creamy serosanguinous fluid. Surrounding skin is only mildly blanchable excoriated and erythematous. Media file added to chart review to illustrate wound status. Further to the left hip area of the open wound there is a reddened non blanchable area with a hardened yellow-white center. Turn and repo q2h, disposable chux and folded ABD pad under open wound to absorb drainage. Remains NPO uncertain of schedule to have wound debridement, taking only sips to swallow meds. Immobile, using Golvo lift for transfers.     Face to face report given with opportunity to observe patient.    Report given to Bri HAWKINS.     Shanell Valerio   4/13/2018  4:36 PM

## 2018-04-13 NOTE — IP AVS SNAPSHOT
[facility]    [address] [place] [id number]    Phone:  [phone]    Fax:  [phone]                                       After Visit Summary   4/13/2018    Seun Emanuel    MRN: [id number]           After Visit Summary Signature Page     I have received my discharge instructions, and my questions have been answered. I have discussed any challenges I see with this plan with the nurse or doctor.    ..........................................................................................................................................  Patient/Patient Representative Signature      ..........................................................................................................................................  Patient Representative Print Name and Relationship to Patient    ..................................................               ................................................  Date                                            Time    ..........................................................................................................................................  Reviewed by Signature/Title    ...................................................              ..............................................  Date                                                            Time

## 2018-04-13 NOTE — ED NOTES
"Presents to emergency room via [facility] with c/o fever and increased confusion. Wife states fever last night 103 with increased confusion. Wife concerned about pressure ulcer on left upper thigh crease of left buttock. Wife states pt picks at the sore. Hx bilateral lower leg ulcerations with drainage. Lower leg ulceration chronic bilateral lower extremity venous stasis changes open weeping wound (serosang) on lateral aspect of LLE.   Awake. C/o pain with any movement. Pt keeps repeating \"I hate her, I hate her!\" afebrile.   Pressure ulcer to left buttock crease measures 5cm x 5cm with tunneling present. Black in color.  Very odorous with serosanguinous drainage. Dr. Haile swabbed wound. Culture sent to lab.   "

## 2018-04-13 NOTE — PLAN OF CARE
[facility] Inpatient Admission Note:    Patient admitted to 3114/3114-1 at approximately 10:45 via bed accompanied by transport tech from emergency room . Report received from Saundra HAWKINS in SBAR format at 10:24 via telephone. Patient transferred to bed via slide board.. Patient is alert and oriented X 2, reports pain; rates at 4 on 0-10 scale.  Patient oriented to room, unit, hourly rounding, and plan of care. Explained admission packet and patient handbook with patient bill of rights brochure. Will continue to monitor and document as needed.     Inpatient Nursing criteria listed below was met:      Health care directives status obtained and documented: Yes      Care Everywhere authorization obtained No      MRSA swab completed for patient 65 years and older: N/A      Patient identifies a surrogate decision maker: Yes If yes, who:Kimmy (wife) Contact Information:[phone]      Core Measure diagnosis present:No. If yes, state diagnosis: n/a       Is initial lactic acid >2.0? No.       Vaccination assessment and education completed: Yes   Influenza(seasonal)  N/A   Vaccination(s) ordered: not given today because pt current on vaccinations      Clergy visit ordered if patient requests: n/a      Skin issues/needs documented: Yes      Isolation Patient: yes Education given, correct sign in place and documentation row added to PCS:  Yes      Fall Prevention Yes: Care plan updated, education given and documented, sticker and magnet in place: Yes      Care Plan initiated: Yes      Education Documented (including assessment): Yes      Patient has discharge needs : Yes If yes, please explain:wound care

## 2018-04-13 NOTE — PROGRESS NOTES
Reviewing his lab work his INR is now down to 1.8.  Because of that we will discontinue the FFP order.    Patient does not wear his fentanyl patch apparently therefore I will discontinue that order also.    His Gram stain did show a fair amount of gram-positive cocci will add vancomycin at least temporarily until we get further cultures back.  We will continue with the Zosyn.

## 2018-04-13 NOTE — H&P
Admitted:     04/13/2018      CHIEF COMPLAINT:  Fever and confusion.      HISTORY OF PRESENT ILLNESS:  Mr. Emanuel is a 72-year-old gentleman who has a previous history of a CVA, is basically almost nonambulatory, has had a lot of chronic venous stasis changes of his lower extremities with recurrent bouts of cellulitis.  He is basically nonambulatory at home.  Was recently here back in the end of February with an episode of cellulitis of his lower extremity.  He returned home where he is basically is nonambulatory.  They use a Jer lift, and over the last couple of days, apparently he has had increasing fevers up to 103 last night, has a decubitus ulcer on his left buttock that he has been digging into, and there has been some bleeding and obvious infection, and his wife brought him to the ER for evaluation of this as he was getting a little confused also.  Apparently he has had no obvious shortness of breath.  His appetite has been fine.  He has been eating without difficulty.  No other bleeding other than from this decubitus ulcer.  His lower extremities, which have a horrendous amount of hyperkeratotic skin, actually have been in very good shape compared to how he was at the end of February, but because of this worsening ulcer, he certainly appears to be quite infected.  It is down to the bone.  He was seen by Surgery down in the ER, Dr. Mitchell, and he recommended that this should be debrided.      The patient is actually alert and remembers me from his previous hospital stay, and his wife is present at the time of the exam and interview.  He denies any pain other than when we rolled him over.  He does have a fair amount of discomfort.  His legs have actually been doing very well as I said above.  Appetite has been good.  No bleeding troubles at all.  No syncope, no chest pains, no shortness of breath at all.  Appetite has been good; no complaints of any abdominal pain.  He has been taking his medications  appropriately per his wife at any rate.  Down in the ER, he did get some blood cultures done.  They did do a culture of this wound.  Also got 2 mg of IV, vitamin K and a dose of Zosyn.      PAST MEDICAL HISTORY:  Significant for the followin.  History of a previous CVA with I believe is left-sided hemiparesis.   2.  History of chronic venous stasis dermatitis and insufficiency of his bilateral lower extremities with an extreme amount of hyperkeratotic skin.  He has had recurrent bouts of cellulitis and hospital admissions for this.   3.  History of a DVT.  He has been on chronic warfarin therapy.   4.  History of COPD.   5.  Chronic kidney disease, stage III.  His creatinine in the past has been about 1.2 range, but even during his hospital stays has actually been pretty good.  This last hospital stay here was actually 1.12 for his creatinine.     6.  History of MRSA.   7.  History of some dementia with delirium.   8.  History of factor V Leiden deficiency in the past.   9.  No known history of any coronary artery disease that I see in the available medical record.   10.  Hypothyroidism.   11.  History of some gout.   12.  Status post cholecystectomy.   13.  He is status post a hip fracture with repair.   14.  Tonsillectomy.      HOME MEDICATIONS:   1.  Seroquel 25 mg every 4 hours as needed.   2.  DuoNeb every 6 hours p.r.n.   3.  Fentanyl patch 75 mcg every 72 hours.   4.  Advair Diskus 250/50, one puff twice daily.   5.  Nicoderm patch 21 mg per 24 hours.   6.  Florastor 250 b.i.d.   7.  Warfarin 2.5 mg daily, has currently been on hold.   8.  Multivitamin daily.   9.  Haldol 5 mg every 8 hours p.r.n. agitation.   10.  Amlodipine 5 mg daily.   11.  Risperdal 0.25 mg daily.   12.  Losartan 100 mg daily.   13.  Oxycodone 15 mg every 8 hours p.r.n. pain.   14.  Hydrocodone APAP also he has 10/325 one every 6 hours p.r.n. moderate pain.   15.  Aspirin 81 mg daily.   16.  Lisinopril 10 mg daily.   17.   Levothyroxine 125 mcg daily.   18.  Toprol- mg daily.   19.  Torsemide 20 mg daily.   20.  Baclofen 10 mg t.i.d.   21.  Gabapentin 100 mg t.i.d.   22.  Nexium 40 mg daily.   23.  Potassium chloride 20 mEq daily.      ALLERGIES:  HE HAS ALLERGIES AND INTOLERANCES TO BACTROBAN, SULFA DRUGS, SULFASALAZINE AND DIAPAMIDE, LASIX UNKNOWN (TAKES TORSEMIDE).      SOCIAL HISTORY:  He lives at home with his wife who is his primary care provider.  He is nonambulatory.      FAMILY HISTORY:  Positive for heart disease in mom and dad.  Retired from [employer].  He is , has 3 kids.  Smoker 20+ pack years.  Nondrinker.      REVIEW OF SYSTEMS:  Pertinent positives and negatives noted above in the HPI.      PHYSICAL EXAMINATION:   GENERAL:  The patient is alert, perhaps mildly confused, but was able to follow commands.  Is able to speak and recognizes me.   VITAL SIGNS:  His current vital signs show his blood pressure is 186/79, heart rate is 60 and regular, temperature is 98.5.  Sats are 94% on room air.  His weight was 77.5 kg bed scale.   HEENT:  Normocephalic, atraumatic.  Pupils are equal, round, reactive.  Sclerae are clear.  Mucous membranes are very dry.  He has some sort of like a contracture with his neck more towards the right.   LUNGS:  Basically clear to auscultation.   CARDIAC:  Regular rate and rhythm; normal S1, S2.  No audible murmurs, rubs or gallops.  Neck veins are impossible to assess.  Pulses are 2+ and equal.   ABDOMEN:  Soft, bowel sounds are positive, nontender.  No obvious masses.     BUTTOCKS:  His buttocks area:  He has a very large open decubitus ulcer, foul smelling, very deep, by at least maybe several centimeters deep down to the bone.   EXTREMITIES:  Left leg is larger than right.  He has below the knees a severe amount of bilateral hyperkeratotic skin.  Actually they look quite well in terms of his previous presentations.  There is no obvious drainage or signs of a cellulitis.    NEUROLOGIC:  He has got contractures.  He is alert, oriented at least to himself and place.      LABORATORY DATA:  Shows the following:  Sodium is 141, potassium 3.7, chloride 104, CO2 is 30, BUN is 29, creatinine is 1.41, glucose is 109, calcium is 9, anion gap is 7, albumin is 2.4, total protein 7, total bilirubin 0.6, alkaline phosphatase 84, ALT 15, AST 11.  CRP is 246.  Lactic acid 1.8.  White count 14,900.  Hemoglobin is 15.6, platelet count is 310,000.  INR is 2.49.  He has had blood cultures and urine cultures done and also wound culture done.  He has many gram-positive cocci seen on the Gram stain from this wound.      ASSESSMENT:   1.  Infected left decubitus ulcer.  The patient is basically bed bound in the past, has not had any major ulcerations, but this is certainly a severe one.  Apparently, the patient has been digging at it, and it is just definitely quite infected down to the bone there.  Surgery has been consulted.  They tentatively plan on debriding this sometime today.  He has no obvious signs of sepsis at this point, hemodynamically stable.  Lactic acid is normal.  He has had blood cultures done, cultures done of the wound.  Will be getting IV fluids.  Zosyn has been started.  We will continue with this.  Likely will give him some vancomycin, too.  He is MRSA positive.  There are gram-positive cocci seen at least on the Gram stain.  Could just be a contamination also, but will cover with some vancomycin currently.  He did have a significant fever last night.  No fever currently.  White count is elevated; CRP is quite high.  I said no signs of sepsis at this time.   2.  Elevated INR.  He is on Coumadin for previous DVT.  It is 2.49 currently.  He did get 2 mg of vitamin K downstairs.  Will repeat his INR and give him some FFP at this point.  Get him prepped for possible surgery, even this evening, per Dr. Mitchell.     3.  Acute on chronic kidney injury.  BUN and creatinine are above what his  baseline is.  Definitely appears to be somewhat dry on examination.  We will give him IV fluids and follow his I's and O's and repeat his lab evaluation.   4.  Chronic venous stasis changes of his legs.  Surprisingly, they actually look very good.  In the past he has had numerous rounds of cellulitis.  There is no obvious cellulitis at this time; has minimal edema compared to previously.   5.  History of cerebrovascular accident.  He is actually alert at this time.  Had maybe some earlier confusion per his wife, but seems to be relatively clear compared to what his usual baseline is.  I do not feel there are any acute neurological issues.   6.  Cerebrovascular accident.  No obvious new findings.   7.  Hypertension.  He is on losartan, lisinopril, beta blocker and Norvasc.  We will try and give him most of these.  I am not sure why he is on both ACE inhibitor and losartan.  If anything, I probably would stop the lisinopril and just use only the losartan.        In terms of surgical readiness, at this point giving him some fluids.  We will try and get his INR down to a more reasonable value with some FFP, but I think he is probably as optimized as he will be after those after for the fluid and reduction of his INR that he could undergo this debridement.  He has no known coronary artery disease.  Does have a previous history of stroke.  Also does have the COPD, but that appears to be stable at this time.  Anticipate hospital stay is definitely going be greater than or equal to 2 midnights.         LORRIE VALLES MD             D: 2018   T: 2018   MT: ALETA      Name:     SHILPA COHN   MRN:      [id number]        Account:      [id number]   :      1945        Admitted:     2018                   Document: [id number]       cc: Herbie Peter DO

## 2018-04-13 NOTE — PROGRESS NOTES
Plan on operative debridement in am, unless clinical course changes ok to eat NPO at midnight     Sampson Mitchell

## 2018-04-13 NOTE — PROGRESS NOTES
Reason for Referral: assessment and evaluate  PCP: Herbie Peter  Cognitive Behavioral Status: he is not clear today.      Affect and Mood Status: sleepy and not too arousable    Support System: wife, home care Re[facility]    Current Living Situation:    Home Setting: he is using a ramp to access his home.  Use of all items are located on the main floor.  Hospital bed, commode, bed pan, bam lift.     Living Situation:resides with his wife.  He has a son locally, a daughter in [place] and a son who lives in [place].   Function Status/Assistive Device: He uses a power wheelchair, he has a help  From his family and home care    Employment/Financial/Insurance Concerns: retired         Patient's insurance coverage: medicare and AARP     Status: No  Previous Services:[facility]  Fall Risk assessment in home: not currently   Receptive for brochure to take home: N/A    Smoke alarm/Carbon monoxide detectors in home: 7 fire detectors and 2 carbon monoxide :   Fire extinguisher: 2    Assessment: This is a 72 year old man who is quite sick.  He is being seen by home care through [facility]. He is compromise in that he is not mobile.  He requires a bam lift for his transfers for toileting.  He is able to comb his hair, and wash his face and chest on a good day.  He has a pharmacy at [facility].    He does smoke.  He does not drink.  He does sleep some of the night otherwise he is up.  Dania will feed him things that are liquid based.   They have a handicap sticker for the He enjoys crossword puzzles, watching nascar and watching TV.  Per discharge planning, the choice of vendor list has been provided to the patient/family for snf    First Choice : [facility]    Second Choice: [facility]    Third Choice:     Discussed the idea of an LTAC as well at [facility].      Plan: to e svitlana.

## 2018-04-13 NOTE — PHARMACY-VANCOMYCIN DOSING SERVICE
Pharmacy Vancomycin Initial Note  Date of Service 2018  Patient's  1945  72 year old, male    Indication: Skin and Soft Tissue Infection    Current estimated CrCl = Estimated Creatinine Clearance: 51.1 mL/min (based on Cr of 1.41).    Creatinine for last 3 days  2018:  8:40 AM Creatinine 1.41 mg/dL    Recent Vancomycin Level(s) for last 3 days  No results found for requested labs within last 72 hours.      Vancomycin IV Administrations (past 72 hours)      No vancomycin orders with administrations in past 72 hours.                Nephrotoxins and other renal medications (Future)    Start     Dose/Rate Route Frequency Ordered Stop    18 1530  piperacillin-tazobactam (ZOSYN) infusion 3.375 g      3.375 g  100 mL/hr over 30 Minutes Intravenous EVERY 6 HOURS 18 1401      18 1500  vancomycin (VANCOCIN) 1,500 mg in D5W 500 mL intermittent infusion      1,500 mg  282.5 mL/hr over 2 Hours Intravenous EVERY 18 HOURS 18 1448            Contrast Orders - past 72 hours     None                Plan:  1.  Start vancomycin  1500 mg IV q18h.   2.  Goal Trough Level: 15-20 mg/L   3.  Pharmacy will check trough levels as appropriate in 1-3 Days.    4. Serum creatinine levels will be ordered daily for the first week of therapy and at least twice weekly for subsequent weeks.    5. [place] method utilized to dose vancomycin therapy: [facility]    Jace Holloway

## 2018-04-13 NOTE — ED NOTES
Pt transferred to acute care via stretcher at this time. Afebrile. Wife took personal belongings home.   Nurse to nurse report given to Terese HAWKINS on acute care

## 2018-04-14 NOTE — PROGRESS NOTES
[facility]    Hospitalist Progress Note    Date of Service (when I saw the patient): 04/14/2018    Assessment & Plan   Seun Emanuel is a 72 year old male who was admitted on 4/13/2018.      Infected left decubitus ulcer:  The patient is basically bed bound in the past, has not had any major ulcerations, but this is certainly a severe one.  Apparently, the patient has been digging at it, and has bone exposed. He has no obvious signs of sepsis,remains hemodynamically stable.  Lactic acid is normal.  Zosyn and Vancomycin started on arrival and will be continued, suspect MRSA as he does have previous history, though prelim culture from ED shows gram negative rods only so far. Dr. Mitchell consulted and will be doing debridement today. Family is interested in hospice so will get them involved as soon as they are available.    Bacteremia: 2/2 bottles positive for gram positive cocci. Continue Zosyn and Vanco pending ID and sensitivities as he has history of MRSA.     Elevated INR:  He is on Coumadin for previous DVT.  It is 2.49 currently.  He did get 2 mg of vitamin K downstairs.  Will repeat his INR and give him some FFP at this point.  Get him prepped for possible surgery, even this evening, per Dr. Mitchell.        Acute on chronic kidney injury:  BUN and creatinine are above what his baseline of 0.9 to 1.10.  Definitely appears to be somewhat dry on examination.  He received IVF overnight and renal panel is now back to baseline.     Chronic venous stasis changes of his legs:  Surprisingly, they actually look very good.  In the past he has had numerous rounds of cellulitis.  There is no obvious cellulitis at this time; has minimal edema compared to previously. Chronic lichenization.    History of cerebrovascular accident:  He is actually alert at this time.  Had maybe some earlier confusion per his wife, but seems to be relatively clear compared to what his usual baseline is.       Hypertension:  He is on  losartan, lisinopril, beta blocker and Norvasc.  Will continue with exception of holding lisinopril as he is already on ARB.       # Pain Assessment:  Current Pain Score 4/14/2018   Patient currently in pain? denies   Pain score (0-10) -   Pain location -   Pain descriptors -   Seun carney pain level was assessed and he currently denies pain.        DVT Prophylaxis: Pneumatic Compression Devices  Code Status: DNR/DNI    Disposition: Expected discharge in 2-3 days once bacteremia cleared, hemodynamically stable.    Ai Nunez CNP    Interval History   He states he feels fine, wife at bedside states he is his normal self. He denies any pain, no chest pain or shortness of breath.     -Data reviewed today: I reviewed all new labs and imaging results over the last 24 hours.    Physical Exam   Temp: 97.3  F (36.3  C) Temp src: Tympanic BP: 161/58 Pulse: 66 Heart Rate: 53 Resp: 18 SpO2: 96 % O2 Device: None (Room air)    Vitals:    04/13/18 1045 04/14/18 0654   Weight: 77.5 kg (170 lb 13.7 oz) 77 kg (169 lb 12.1 oz)     Vital Signs with Ranges  Temp:  [97.3  F (36.3  C)-98.5  F (36.9  C)] 97.3  F (36.3  C)  Pulse:  [66] 66  Heart Rate:  [53-70] 53  Resp:  [13-20] 18  BP: (150-198)/(58-80) 161/58  SpO2:  [91 %-100 %] 96 %  I/O last 3 completed shifts:  In: 550 [I.V.:550]  Out: 400 [Urine:400]    Peripheral IV 04/13/18 Left Upper forearm (Active)   Site Assessment WDL 4/14/2018  7:53 AM   Line Status Infusing;Checked every 1-2 hour 4/14/2018  7:53 AM   Phlebitis Scale 0-->no symptoms 4/14/2018  7:53 AM   Infiltration Scale 0 4/14/2018  7:53 AM   Number of days:1       Pressure Ulcer 08/02/16 Proximal Coccyx (Active)   Number of days:620       Pressure Ulcer 08/03/16 Right Hip reddened area with small opening-oozing (Active)   Number of days:619       Pressure Injury 10/11/16 Posterior Buttocks Right cheek cyst looking sore (Active)   Number of days:550       Pressure Injury 10/11/16 Posterior Perineum Sacrum, perineum,  "penis and scrotum (Active)   Number of days:550       Pressure Injury 10/12/16 Left;Distal;Posterior Leg Left lower leg/ankle approx.  5\" long wound (Active)   Number of days:549       Wound 07/23/16 Bilateral;Lower Leg Ulceration chronic bilateral lower extremity venous stasis changes open weeping wound (serosang) on lateral aspect of LLE, weeping drainage from Left lateral heel wound.  (Active)   Number of days:630       Wound 12/16/17 Posterior;Proximal Coccyx Suspected pressure ulcer White wound base with blanchable erythema  (Active)   Site Assessment Black 4/14/2018  7:52 AM   Gena-wound Assessment Black 4/14/2018  7:52 AM   Size - Length x Width x Depth (cm) [id number] 4/13/2018 10:45 AM   Drainage Amount Small 4/14/2018  7:52 AM   Drainage Color/Charcteristics Odor present;Yellow;Serosanguinous 4/14/2018  7:52 AM   Dressing Open to air 4/14/2018  7:52 AM   Staging (WOC) Unstagable 4/13/2018  4:30 PM   Wound Description (WOC) Full thickness 4/13/2018 10:45 AM   Number of days:119       Wound 12/16/17 Anterior;Left;Right Perineum Extravasation L and R extravasation perineal folds (Active)   Number of days:119       Incision/Surgical Site 04/14/18 Left Buttocks (Active)   Number of days:0     Line/device assessment(s) completed for medical necessity    Constitutional: Awake,no acute distress. Interactive.  Respiratory: Diminished bases, no crackles, rhonchi or wheezing. Easy respirations with equal chest rise and fall.   Cardiovascular: HRR, no murmurs, rubs, thrills. Some nonpitting peripheral edema per his usual chronic state.  GI: Soft, nontender, bowel sounds hypoactive   Skin/Integumen: Chronic lichenization bilateral lower legs and feet with areas of clearing with pink skin beneath. No open or draining areas present.       Medications     - MEDICATION INSTRUCTIONS -       lactated ringers 125 mL/hr at 04/14/18 0753       [Auto Hold] amLODIPine  5 mg Oral Daily     [Auto Hold] baclofen (LIORESAL) tablet 10 mg "  10 mg Oral TID     [Auto Hold] gabapentin (NEURONTIN) capsule 100 mg  100 mg Oral TID     [Auto Hold] ipratropium - albuterol 0.5 mg/2.5 mg/3 mL  3 mL Inhalation 4x daily     [Auto Hold] levothyroxine (SYNTHROID/LEVOTHROID) tablet 125 mcg  125 mcg Oral Daily     [Auto Hold] losartan (COZAAR) tablet 100 mg  100 mg Oral Daily     [Auto Hold] metoprolol succinate (TOPROL-XL) 24 hr tablet 100 mg  100 mg Oral Daily     [Auto Hold] nicotine  1 patch Transdermal Daily     [Auto Hold] sodium chloride (PF)  3 mL Intracatheter Q8H     [Auto Hold] nicotine   Transdermal Daily     [Auto Hold] nicotine   Transdermal Q8H     [Auto Hold] pantoprazole  40 mg Oral QAM AC     [Auto Hold] fluticasone-vilanterol  1 puff Inhalation Daily     [Auto Hold] piperacillin-tazobactam  3.375 g Intravenous Q6H     [Auto Hold] vancomycin (VANCOCIN) IV  1,500 mg Intravenous Q18H       Data     Recent Labs  Lab 04/14/18  0637 04/13/18  1355 04/13/18  0840   WBC 9.8  --  14.9*   HGB 12.1*  --  13.6   MCV 85  --  86     --  310   INR 1.15 1.81* 2.49*     --  141   POTASSIUM 3.1*  --  3.7   CHLORIDE 105  --  104   CO2 29  --  30   BUN 19  --  29   CR 1.04  --  1.41*   ANIONGAP 7  --  7   RASHEED 8.4*  --  9.0   GLC 92  --  109*   ALBUMIN  --   --  2.4*   PROTTOTAL  --   --  7.0   BILITOTAL  --   --  0.6   ALKPHOS  --   --  84   ALT  --   --  15   AST  --   --  11       No results found for this or any previous visit (from the past 24 hour(s)).

## 2018-04-14 NOTE — PLAN OF CARE
Lab notified writer of 2/2 bottles of blood cultures being positive for gram cocci in pairs and chains. Pt's primary nurse Vilma updated and reports she will update MD.

## 2018-04-14 NOTE — ANESTHESIA CARE TRANSFER NOTE
Patient: Seun Emanuel    Procedure(s):  EXCISIONAL DEBRIDEMENT LEFT BUTTOCKS,  WOUND: 11 CM X 6 CM X 2CM - Wound Class: II-Clean Contaminated    Diagnosis: LEFT BUTTOCKS ABSCESS  Diagnosis Additional Information: No value filed.    Anesthesia Type:   General, RSI, ETT     Note:  Airway :Nasal Cannula  Patient transferred to:PACU  Comments: Pt drowsy, appears comfortable. VSS. Report to PACU RN.Handoff Report: Identifed the Patient, Identified the Reponsible Provider, Reviewed the pertinent medical history, Discussed the surgical course, Reviewed Intra-OP anesthesia mangement and issues during anesthesia, Set expectations for post-procedure period and Allowed opportunity for questions and acknowledgement of understanding      Vitals: (Last set prior to Anesthesia Care Transfer)    CRNA VITALS  4/14/2018 0952 - 4/14/2018 1038      4/14/2018             Resp Rate (set): 8                Electronically Signed By: YEE Dukes CRNA  April 14, 2018  10:38 AM

## 2018-04-14 NOTE — PROVIDER NOTIFICATION
DATE:  4/14/2018   TIME OF RECEIPT FROM LAB:  0242  LAB TEST:  Blood culture  LAB VALUE:  Bottle 1 of 2: Positive for gram + cocci in clusters and in pairs  RESULTS GIVEN WITH READ-BACK TO (PROVIDER):  MD Yumiko  TIME LAB VALUE REPORTED TO PROVIDER:   0244

## 2018-04-14 NOTE — PROVIDER NOTIFICATION
DATE:  4/14/2018   TIME OF RECEIPT FROM LAB:  0420  LAB TEST:  Bottle 2/2 Blood cultures  LAB VALUE:  Bottle 2:2 Positive gram positive cocci in clusters and pairs   RESULTS GIVEN WITH READ-BACK TO (PROVIDER): MD Yumiko  TIME LAB VALUE REPORTED TO PROVIDER:   0426
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
## 2018-04-14 NOTE — OR NURSING
Pt into ICU for post anesthesia care, pt drowsy, but does awaken when calling his name, initially pt groaning out in pain, Fentanyl given and now pt appears comfortable, pt has many open areas on buttocks in addition to the surgical area. No dressing pt, placed slightly on left side, pt mentation difficult to assess, Is able to nod some to questions, and able to tell RN his name and birthday, however is not able to tell nurse where pain is or able to describe.

## 2018-04-14 NOTE — ANESTHESIA POSTPROCEDURE EVALUATION
Patient: Seun Emanuel    Procedure(s):  EXCISIONAL DEBRIDEMENT LEFT BUTTOCKS,  WOUND: 11 CM X 6 CM X 2CM - Wound Class: II-Clean Contaminated    Diagnosis:LEFT BUTTOCKS ABSCESS  Diagnosis Additional Information: No value filed.    Anesthesia Type:  General, RSI, ETT    Note:  Anesthesia Post Evaluation    Patient location during evaluation: PACU  Patient participation: Able to fully participate in evaluation  Level of consciousness: lethargic and responsive to light touch  Pain management: adequate  Airway patency: patent  Cardiovascular status: acceptable  Respiratory status: acceptable  Hydration status: acceptable  PONV: none     Anesthetic complications: None    Comments: Given fentanyl PRN by PACU RN. Pt now resting comfortable.        Last vitals:  Vitals:    04/14/18 1055 04/14/18 1100 04/14/18 1105   BP: 169/70 169/69    Pulse:      Resp: 14 (!) 9 13   Temp:      SpO2: 97% 100% 100%         Electronically Signed By: YEE Dukes CRNA  April 14, 2018  11:12 AM

## 2018-04-14 NOTE — PLAN OF CARE
"Problem: Patient Care Overview  Goal: Plan of Care/Patient Progress Review  Outcome: No Change   04/14/18 0511   OTHER   Plan Of Care Reviewed With patient   Plan of Care Review   Progress no change       /62 (BP Location: Right arm)  Pulse 66  Temp 98.1  F (36.7  C) (Tympanic)  Resp 20  Ht 1.803 m (5' 11\")  Wt 77.5 kg (170 lb 13.7 oz)  SpO2 94%  BMI 23.83 kg/m2   Pt confused to situation and place. Redirectable. Calling out throughout night, agitated. Haldol PO given which helped to ease pt somewhat.  Lungs clear, BS active, HR regular. T/R q2h. Very painful with turns, norco 1 tab given, pt sleeping afterwards. INC of urine, no BM this shift. NPO at midnight, AM PO meds held. Wife updated this shift and is aware she needs to come in for consent. No falls or injuries this shift. Will continue to monitor.     Face to face report given with opportunity to observe patient.    Report given to ROSS Sanon   4/14/2018  7:21 AM      "

## 2018-04-14 NOTE — ED PROVIDER NOTES
"eMERGENCY dEPARTMENT eNCOUnter        CHIEF COMPLAINT    Chief Complaint   Patient presents with     Altered Mental Status     Wife reports increased confusion and fever last night 0f 103.2     Skin Ulcer     Pressure ulcer to left thigh       HPI    Seun Emanuel is a 72 year old male who presents with altered mental status and fever last night.  They will is severely chronically ill.  He lives at home with his wife but is almost complete cares.  They have home care once a week.  He has chronic bilateral venous stasis ulcers of his lower legs.  His wife reports that last night he had a temp of 103 and seemed more irritable.  This is how she knows he is getting sick.  He has dementia and aggressive behaviors.  She also reports that he has a skin ulcer in his left thigh.  He will let her look at it but she said \"he keeps picking at it  Yesterday his hand was back there and when he pulled it out it was full of flesh and blood\" she called the ambulance this morning.    REVIEW OF SYSTEMS    Neurologic: no LOC, No hearing loss  Cardiac: No Chest Pain, no syncope  Respiratory: No cough or difficulty breathing  GI: No Bloody Stool or Diarrhea  : No Dysuria or Hematuria  General: No Fever  All other systems reviewed and are negative.    PAST MEDICAL & SURGICAL HISTORY    Past Medical History:   Diagnosis Date     Asthma      Bilateral lower leg cellulitis      Chronic venous insufficiency      CKD (chronic kidney disease), stage III      COPD (chronic obstructive pulmonary disease) (H)      CVA (cerebral vascular accident) (H)      DVT (deep venous thrombosis) (H)      Gout      Hypertension      Hypothyroidism      MRSA (methicillin resistant Staphylococcus aureus) infection      Osteoarthritis      Peripheral vascular disease (H)      Ulcers of both lower legs (H)      Past Surgical History:   Procedure Laterality Date     AMPUTATION Left 03/07/2012    left great toe     CHOLECYSTECTOMY  1994     HIP SURGERY Left " "01/28/2015     IRRIGATION AND DEBRIDEMENT LOWER EXTREMITY, COMBINED  2010    Both legs     TONSILLECTOMY         CURRENT MEDICATIONS    No current outpatient prescriptions on file.       ALLERGIES    Allergies   Allergen Reactions     Bactroban [Mupirocin]      Furosemide Unknown     Takes torsemide at home  Unknown     Indapamide      Lasix      Takes torsemide at home  Unknown       Sulfa Drugs      Vomiting, can't breathe       Sulfasalazine Nausea and Vomiting     Vomiting, can't breathe       SOCIAL & FAMILY HISTORY    Social History     Social History     Marital status:      Spouse name: Kimmy     Number of children: 3     Years of education: N/A     Occupational History     Retired [employer]     Social History Main Topics     Smoking status: Current Every Day Smoker     Packs/day: 1.00     Years: 20.00     Smokeless tobacco: Never Used      Comment: Recently restarted     Alcohol use No     Drug use: No     Sexual activity: Not on file     Other Topics Concern     Not on file     Social History Narrative     Family History   Problem Relation Age of Onset     HEART DISEASE Mother      CAD     CANCER Mother      HEART DISEASE Father      CHF     Genitourinary Problems Father      Renal failure     Hypertension Father        PHYSICAL EXAM    VITAL SIGNS: /60 (BP Location: Right arm)  Pulse 66  Temp 98.5  F (36.9  C) (Tympanic)  Resp 18  Ht 1.803 m (5' 11\")  Wt 77.5 kg (170 lb 13.7 oz)  SpO2 93%  BMI 23.83 kg/m2   Constitutional:elderly, contracted, irritable elderly man, chronically ill appearing  Eyes: Pupils equally round and react to light, sclera nonicteric  HENT:  Atraumatic, no trismus  Neck: supple, no JVD, no posterior neck tenderness  Respiratory:  Lungs Clear, no retractions   Cardiovascular:  normal rate, no murmurs  GI:  Soft, nontender, normal bowel sounds  Musculoskeletal: he has severe bilateral pitting edema with chronic lichenification of both lower extremities.  We " had to get several staff to turn him onto his right side.  This demonstrates a golf ball sized left ischiall ulcer extending through fat muscle and down to bone.  Surrounding erythema.    Vascular: Diminished pedal pulses   integument:  Well hydrated, no petechiae   Neurologic: Mumbling speech     ED COURSE & MEDICAL DECISION MAKING    Pertinent Labs & Imaging studies reviewed and interpreted. (See chart for details)    See chart for details of medications given during the ED stay.    Vitals:    04/13/18 1438 04/13/18 1451 04/13/18 1557 04/13/18 1800   BP: (!) 198/67  150/60    BP Location: Left arm  Right arm    Pulse:       Resp: 19 18    Temp:   98.5  F (36.9  C)    TempSrc:   Tympanic    SpO2: 95% 95% (!) 91% 93%   Weight:       Height:               FINAL IMPRESSION    1. Infected skin ulcer with necrosis of muscle (H)        PLAN  His sacral wound is the obvious source of his fever and will need surgical debridement.  Dr. Rubin is here in the department to see the patient.  He is given Zosyn.  I did speak to home care who has been seeing him weekly.  They said that if he is up in the chair when they arrive he will not allow them to examine the sacral area.  He is admitted to the hospitalist service Dr. Ponce accepting.        (Please note that this note was completed with a voice recognition program.  Every attempt was made to edit the dictations, but inevitably there remain words that are mis-transcribed.)       Carla Haile MD  04/13/18 1958

## 2018-04-14 NOTE — PLAN OF CARE
Problem: Patient Care Overview  Goal: Plan of Care/Patient Progress Review  Outcome: No Change  Pt. Has been afebrile through the day, VS as charted, HR jm - held metoprolol.  His O2 sats are in the mid to upper 90's on RA, lung sounds are very diminished with slight expiratory wheezing to NANDO lobe after surgery.  He does deny any pain/discomfort unless he is moved - did receive a PRN dose of norco x 1.  Dressing has remained intact from surgery.  His legs are open to the air, 3+ edema, very dry/scaly.  He has been turned and repositioned throughout the day.  Around 1315 he did wake up and was pulling at items - he broke his IV tubing and IV bag drained all over his bed, pulled off pulse ox, disconnected his BP cuff, yelling and screaming out.  He was changed and cleaned up - very thirsty - did drink a total of 1 1/2 pictures of water.  He was then initially refusing any medication, IV fluids/antibiotics, was able to restart IV fluids and then IV antibiotics around [id number] (finally agreed to).  Nicotine patch placed.  He has remained free of falls throughout the day, call light within reach, bed alarm on and frequent rounding done to assess needs.   \  Face to face report given with opportunity to observe patient.    Report given to Elizabeth Montenegro   4/14/2018  4:12 PM

## 2018-04-14 NOTE — PHARMACY
[facility]    Pharmacy    Antimicrobial Stewardship Note     Current antimicrobial therapy:  Anti-infectives (Future)    Start     Dose/Rate Route Frequency Ordered Stop    04/13/18 1530  piperacillin-tazobactam (ZOSYN) infusion 3.375 g      3.375 g  100 mL/hr over 30 Minutes Intravenous EVERY 6 HOURS 04/13/18 1401      04/13/18 1500  vancomycin (VANCOCIN) 1,500 mg in D5W 500 mL intermittent infusion      1,500 mg  282.5 mL/hr over 2 Hours Intravenous EVERY 18 HOURS 04/13/18 1448          Indication: SSTI, per cultures (below) has positive blood cultures for gram+ cocci in clusters and pairs    Days of Therapy: 2     Pertinent labs:  Creatinine   Creatinine   Date Value Ref Range Status   04/14/2018 1.04 0.66 - 1.25 mg/dL Final   04/13/2018 1.41 (H) 0.66 - 1.25 mg/dL Final   03/27/2018 1.12 0.66 - 1.25 mg/dL Final     WBC   WBC   Date Value Ref Range Status   04/14/2018 9.8 4.0 - 11.0 10e9/L Final   04/13/2018 14.9 (H) 4.0 - 11.0 10e9/L Final   03/27/2018 9.4 4.0 - 11.0 10e9/L Final     Procalcitonin No results found for: PCAL  CRP   CRP Inflammation   Date Value Ref Range Status   04/13/2018 246.0 (H) 0.0 - 8.0 mg/L Final   12/16/2017 20.4 (H) 0.0 - 8.0 mg/L Final   10/12/2016 78.7 (H) 0.0 - 8.0 mg/L Final     Culture Results:   7-Day Micro Results      Procedure Component Value Units Date/Time     Anaerobic bacterial culture [[id number]] Collected: 04/14/18 1025     Order Status: Completed Lab Status: In process Updated: 04/14/18 1139     Specimen: Tissue from Buttock      Gram stain [[id number]] (Abnormal) Collected: 04/14/18 1025     Order Status: Completed Lab Status: Final result Updated: 04/14/18 1310     Specimen: Tissue from Buttock       Specimen Description Left Buttock      Gram Stain Many   PMNs seen          Moderate   Gram positive cocci    (A)     Tissue Culture Aerobic Bacterial [[id number]] Collected: 04/14/18 1025     Order Status: Completed Lab Status: In process Updated: 04/14/18 0151      Specimen: Tissue from Buttock      Blood culture [[id number]] Collected: 04/13/18 0914     Order Status: Completed Lab Status: Preliminary result Updated: 04/14/18 1235     Specimen: Blood       Specimen Description Blood      Culture Micro No growth after 1 day     Wound Culture Aerobic Bacterial [[id number]] (Abnormal) Collected: 04/13/18 0840     Order Status: Completed Lab Status: Preliminary result Updated: 04/14/18 0747     Specimen: Left Buttock       Specimen Description Left Buttock Ulcer      Culture Micro Heavy growth   Gram negative rods   Identification and susceptibility to follow.    (A)     Gram stain [[id number]] (Abnormal) Collected: 04/13/18 0840     Order Status: Completed Lab Status: Final result Updated: 04/13/18 1404     Specimen: Left Buttock       Specimen Description Left Buttock Ulcer      Gram Stain No PMNs seen       Many   Gram positive cocci    (A)     Blood culture [[id number]] (Abnormal) Collected: 04/13/18 0840     Order Status: Completed Lab Status: Preliminary result Updated: 04/14/18 1125     Specimen: Blood       Specimen Description Blood Left Arm      Culture Micro 2 OF 2 BOTTLES   Gram positive cocci in clusters   Gram positive cocci in pairs    (A)       Gram stain review consistent with reported results.       Critical Value called to and read back by   SOHAN CARBAJAL 0242 4/14/18 BY RAMONA CALDERON GRAM STAIN BOTTLED 1 CALLED          Critical Value called to and read back by   OLEG HE 0420 4/14/18 BY RAMONA CALDERON GRAM STAIN BOTTLE 2 CALLED      Recommendations/Interventions:  1. Received excisional debridement of wound this AM. Has previous hx of MRSA and has positive blood cultures for gm+ cocci in clusters and pairs as well as gm- rods in wound. Will await ID and sensitivities. No recommendations at this time.     Telma Herrera RPH  April 14, 2018

## 2018-04-14 NOTE — PLAN OF CARE
Problem: Wound (Includes Pressure Injury) (Adult)  Goal: Signs and Symptoms of Listed Potential Problems Will be Absent, Minimized or Managed (Wound)  Signs and symptoms of listed potential problems will be absent, minimized or managed by discharge/transition of care (reference Wound (Includes Pressure Injury) (Adult) CPG).    04/13/18 2143   Wound (Includes Pressure Injury)   Problems Assessed (Wound) all   Problems Present (Wound) bleeding;delayed wound healing;infection;pain;situational response       VSS. Alert but disoriented to time, place, and situation. Asking questions out of context and calling out.Increasing agitation throughout shift, PRN haldol given. C/o pain unable to rate, prn norco given. Wife assisting with cares. Turning from R side to floating q 2 hours. Incontinent of stool. Pressure ulcer as charted.   Face to face report given with opportunity to observe patient. Small dime size open areas chu sides of pressure ulcer    Report given to ROSS Esparza   4/13/2018  11:12 PM

## 2018-04-14 NOTE — PROGRESS NOTES
Referrals sent to [facility] and [facility] per family preference from ED  notes. Spoke with spouse Dania, she requested a referral be sent to [facility] as well.

## 2018-04-15 NOTE — PLAN OF CARE
"Problem: Patient Care Overview  Goal: Plan of Care/Patient Progress Review  Outcome: Improving  Pt lethargic this AM, didn't eat breakfast. Pt more alert this afternoon. Wife at bedside until 1330. Dressing change done to surgical ulcer by writer. IV inserted by anesthesia at 1320. Pt on LR 75ml/hr. On IV Vanco and Zosyn. Legs wrapped with kerlix. Prn norco administered once for pain with relief. Pt continent this shift. Ate some lunch with assist from wife. Pt had bed bath today. BP (!) 122/49 (BP Location: Right arm)  Pulse 66  Temp 98.6  F (37  C) (Axillary)  Resp 20  Ht 1.803 m (5' 11\")  Wt 79.3 kg (174 lb 13.2 oz)  SpO2 92%  BMI 24.38 kg/m2.     Face to face report given with opportunity to observe patient.    Report given to Alexa Lopez   4/15/2018  3:33 PM      "

## 2018-04-15 NOTE — PHARMACY
[facility]    Pharmacy    Antimicrobial Stewardship Note     Current antimicrobial therapy:  Anti-infectives (Future)    Start     Dose/Rate Route Frequency Ordered Stop    04/13/18 1530  piperacillin-tazobactam (ZOSYN) infusion 3.375 g      3.375 g  100 mL/hr over 30 Minutes Intravenous EVERY 6 HOURS 04/13/18 1401      04/13/18 1500  vancomycin (VANCOCIN) 1,500 mg in D5W 500 mL intermittent infusion      1,500 mg  282.5 mL/hr over 2 Hours Intravenous EVERY 18 HOURS 04/13/18 1448          Indication: SSTI, per cultures (below) has positive blood cultures for gram+ cocci in clusters and pairs    Days of Therapy: 3     Pertinent labs:  Creatinine   Creatinine   Date Value Ref Range Status   04/15/2018 1.00 0.66 - 1.25 mg/dL Final   04/14/2018 1.04 0.66 - 1.25 mg/dL Final   04/13/2018 1.41 (H) 0.66 - 1.25 mg/dL Final     WBC   WBC   Date Value Ref Range Status   04/15/2018 8.1 4.0 - 11.0 10e9/L Final   04/14/2018 9.8 4.0 - 11.0 10e9/L Final   04/13/2018 14.9 (H) 4.0 - 11.0 10e9/L Final     Procalcitonin No results found for: PCAL  CRP   CRP Inflammation   Date Value Ref Range Status   04/13/2018 246.0 (H) 0.0 - 8.0 mg/L Final   12/16/2017 20.4 (H) 0.0 - 8.0 mg/L Final   10/12/2016 78.7 (H) 0.0 - 8.0 mg/L Final     Culture Results:     30-Day Micro Results      Procedure Component Value Units Date/Time     Anaerobic bacterial culture [[id number]] Collected: 04/14/18 1025     Order Status: Completed Lab Status: Preliminary result Updated: 04/15/18 1112     Specimen: Tissue from Buttock       Specimen Description Left Buttock      Culture Micro Culture in progress     Gram stain [[id number]] (Abnormal) Collected: 04/14/18 1025     Order Status: Completed Lab Status: Edited Result - FINAL Updated: 04/15/18 0936     Specimen: Tissue from Buttock       Specimen Description Left Buttock      Gram Stain Many   PMNs seen          Moderate   Gram positive cocci    (A)     Tissue Culture Aerobic Bacterial [[id number]] (Abnormal)  Collected: 04/14/18 1025     Order Status: Completed Lab Status: Preliminary result Updated: 04/15/18 1014     Specimen: Tissue from Buttock       Specimen Description Left Buttock      Culture Micro Moderate growth   Non lactose fermenting gram negative rods   Identification and susceptibility to follow.    (A)       Light growth   Lactose fermenting gram negative rods   Identification and susceptibility to follow.    (A)     Blood culture [[id number]] Collected: 04/13/18 0914     Order Status: Completed Lab Status: Preliminary result Updated: 04/15/18 0700     Specimen: Blood       Specimen Description Blood      Culture Micro No growth after 2 days     Wound Culture Aerobic Bacterial [[id number]] (Abnormal) Collected: 04/13/18 0840     Order Status: Completed Lab Status: Preliminary result Updated: 04/15/18 1420     Specimen: Left Buttock       Specimen Description Left Buttock Ulcer      Culture Micro Heavy growth   Non lactose fermenting gram negative rods   Identification and susceptibility to follow.    (A)       Light growth   Lactose fermenting gram negative rods   Identification and susceptibility to follow.    (A)     Gram stain [[id number]] (Abnormal) Collected: 04/13/18 0840     Order Status: Completed Lab Status: Final result Updated: 04/13/18 1404     Specimen: Left Buttock       Specimen Description Left Buttock Ulcer      Gram Stain No PMNs seen       Many   Gram positive cocci    (A)     Blood culture [[id number]] (Abnormal) Collected: 04/13/18 0840     Order Status: Completed Lab Status: Preliminary result Updated: 04/14/18 1125     Specimen: Blood       Specimen Description Blood Left Arm      Culture Micro 2 OF 2 BOTTLES   Gram positive cocci in clusters   Gram positive cocci in pairs    (A)       Gram stain review consistent with reported results.       Critical Value called to and read back by   SOHAN CARBAJAL 0242 4/14/18 BY RAMONA MANCERA GRAM STAIN BOTTLED 1 CALLED          Critical Value called to and read  back by   OLEG HE 0420 4/14/18 BY RAMONA MANCERA GRAM STAIN BOTTLE 2 CALLED        Urine Culture Aerobic Bacterial [[id number]] Collected: 03/27/18 1644     Order Status: Completed Lab Status: Final result Updated: 03/29/18 0748     Specimen: Catheterized Urine       Specimen Description Catheterized Urine      Culture Micro No growth     Blood culture [[id number]] Collected: 03/27/18 1144     Order Status: Completed Lab Status: Final result Updated: 04/02/18 0751     Specimen: Blood       Specimen Description Blood      Culture Micro No growth after 6 days     Blood culture [[id number]] Collected: 03/27/18 1143     Order Status: Completed Lab Status: Final result Updated: 04/02/18 0751     Specimen: Blood       Specimen Description Blood      Culture Micro No growth after 6 days     Blood culture      Order Status: Canceled Lab Status: No result      Specimen: Blood      Blood culture      Order Status: Canceled Lab Status: No result      Specimen: Blood      Blood culture [[id number]] Collected: 02/12/18 1820     Order Status: Completed Lab Status: Final result Updated: 02/12/18 1826     Specimen: Blood       Specimen Description Blood      Culture Micro Canceled, Test credited       Test canceled by physician     Blood culture [[id number]] Collected: 02/12/18 1752     Order Status: Completed Lab Status: Final result Updated: 02/19/18 0649     Specimen: Blood       Specimen Description Blood Right Arm      Culture Micro No growth after 7 days     Urine Culture Aerobic Bacterial [[id number]] Collected: 02/12/18 1745     Order Status: Completed Lab Status: Final result Updated: 02/14/18 0758     Specimen: Catheterized Urine       Specimen Description Catheterized Urine      Culture Micro No growth       Recommendations/Interventions:  1. Awaiting ID and sensitivities. No recommendations at this time.     Telma Herrera RPH  April 15, 2018

## 2018-04-15 NOTE — PLAN OF CARE
At approx 0400 IV infiltrated. Primary nurse and ICU nurse attempted to start a new one however pt refusing after 2nd try. Next ABX due at 0800. MD aware. Plan is to call anesthesia in AM when they are in house. Will continue to monitor.

## 2018-04-15 NOTE — PHARMACY-ANTICOAGULATION SERVICE
INR = 1.09 this morning. Warfarin has been held while here and IV Vit K administered to ensure therapeutic INR before procedure. Currently on Zosyn and vancomycin. Recommend 5 mg warfarin today. Pharmacy will clarify home regimen with clinic tomorrow during clinic hours.

## 2018-04-15 NOTE — PROVIDER NOTIFICATION
Dr. diaz notified of plan on doing second dressing change this evening. Ok to give pain meds now. He will come in to see wound.

## 2018-04-15 NOTE — PLAN OF CARE
"Problem: Patient Care Overview  Goal: Plan of Care/Patient Progress Review  Outcome: No Change  /54 (BP Location: Right arm)  Pulse 66  Temp 99.3  F (37.4  C) (Tympanic)  Resp 20  Ht 1.803 m (5' 11\")  Wt 77 kg (169 lb 12.1 oz)  SpO2 93%  BMI 23.68 kg/m2    Pt alert and oriented to self and place. C/O pain in BLE and buttocks. Norco 1 tab given. Agitation and confusion progressed, haldol given in AM. Dressings to coccyx saturated with serosanguinous drainage, ABD pads changed only. T/R q2h. No BM this shift. Legs elevated. Adequate urinary output. No falls or injuries this shift. Will continue to monitor.     Face to face report given with opportunity to observe patient.    Report given to ROSS Sears   4/15/2018  7:04 AM      "

## 2018-04-15 NOTE — PLAN OF CARE
"Problem: Wound (Includes Pressure Injury) (Adult)  Goal: Signs and Symptoms of Listed Potential Problems Will be Absent, Minimized or Managed (Wound)  Signs and symptoms of listed potential problems will be absent, minimized or managed by discharge/transition of care (reference Wound (Includes Pressure Injury) (Adult) CPG).   Outcome: No Change   04/14/18 1700   Wound (Includes Pressure Injury)   Problems Assessed (Wound) all   Problems Present (Wound) delayed wound healing;infection;pain;situational response       Problem: Skin and Soft Tissue Infection (Adult)  Goal: Signs and Symptoms of Listed Potential Problems Will be Absent, Minimized or Managed (Skin and Soft Tissue Infection)  Signs and symptoms of listed potential problems will be absent, minimized or managed by discharge/transition of care (reference Skin and Soft Tissue Infection (Adult) CPG).   Outcome: No Change   04/14/18 1700   Skin and Soft Tissue Infection   Problems Assessed (Skin and Soft Tissue Infection) all   Problems Present (Skin/Soft Tissue Inf) infection progression;pain;situational response     Most Recent Vitals: /62  Pulse 66  Temp 97.8  F (36.6  C) (Tympanic)  Resp 18  Ht 1.803 m (5' 11\")  Wt 77 kg (169 lb 12.1 oz)  SpO2 97%  BMI 23.68 kg/m2  Orientation: Alert, oriented to self, place, disoriented to time and situation at times. Pt calm/cooperative when wife was here. Very agitated, angry after she left. Pt agreed that he would like to take a Haldol. Pt was then quite relaxed for a few hours. Delayed bedtime baclofen and gabapentin until pt was less sleepy.   Pain Management: Norco 10-325mg given x1 this shift with good effect.   Cardiac: WDL  Respiratory: Lung sounds diminished  GI: Tolerates diet  : WDL. Incontinent at times.  Skin: Pressure ulcer packed with kerlix and covered with ABDs, ABDs changed x2 this shift with moderates amount of serosanguinous drainage. Another pressure ulcer on left hip, see flowsheets. " Bilateral lower extremities covered with kerlix, large amounts of dried, crusted plaque.   IV Fluids: LR at 125ml/hr  ABX: vanco and zosyn  Mobility: Bedrest. Turned/repositioned q2 hours  Safety: Alarms active and audible. Pt does use call light at times, otherwise yells out for help quite often    Face to face report given with opportunity to observe patient.    Report given to Vilma Cantu   4/14/2018  11:27 PM

## 2018-04-15 NOTE — PROGRESS NOTES
[facility]    Hospitalist Progress Note    Date of Service (when I saw the patient): 04/15/2018    Assessment & Plan   Seun Emanuel is a 72 year old male who was admitted on 4/13/2018.      Infected left decubitus ulcer:  The patient is basically bed bound in the past, has not had any major ulcerations, but this is certainly a severe one.  Apparently, the patient has been digging at it, and has bone exposed. He has no obvious signs of sepsis,remains hemodynamically stable.  Lactic acid is normal.  Zosyn and Vancomycin started on arrival and will be continued pending final culture. I&D was done by Dr. Mitchell 4/14, currently doing wet to dry dressings.  Family is interested in hospice so will get them involved as soon as they are available.    Bacteremia: 2/4 bottles positive for gram positive cocci. His WBC is normal and is not toxic, no signs of sepsis. Continue Zosyn and Vanco pending ID and sensitivities as he has history of MRSA. Will repeat cultures in AM 48 hours after treatment started.      Elevated INR:  He is on Coumadin for previous DVT.  Reversed with Vit K in preparation for I&D     Acute on chronic kidney injury:  BUN and creatinine are above what his baseline of 0.9 to 1.10 on arrival.  Appeared to be somewhat dry on examination.  He received IVF overnight and renal panel is now back to baseline.     Chronic venous stasis changes of his legs:  Surprisingly, they actually look very good.  In the past he has had numerous rounds of cellulitis.  There is no obvious cellulitis at this time; has minimal edema compared to previously. Chronic lichenization.    History of cerebrovascular accident: Remains at baseline     Hypertension:  He is on losartan, lisinopril, beta blocker and Norvasc.  Will continue with exception of holding lisinopril as he is already on ARB. Blood pressures have been stable.       # Pain Assessment:  Current Pain Score 4/15/2018   Patient currently in pain? sleeping:  "patient not able to self report   Pain score (0-10) -   Pain location -   Pain descriptors -   Seun carney pain level was assessed and he currently denies pain.        DVT Prophylaxis: Pneumatic Compression Devices  Code Status: DNR/DNI    Disposition: Expected discharge in 2-3 days once bacteremia cleared, hemodynamically stable.    Ai Nunez CNP    Interval History   He states he feels fine, wife at bedside states he is his normal self. He denies any pain, no chest pain or shortness of breath.     -Data reviewed today: I reviewed all new labs and imaging results over the last 24 hours.    Physical Exam   Temp: 98.6  F (37  C) Temp src: Axillary BP: (!) 122/49   Heart Rate: 68 Resp: 20 SpO2: 92 % O2 Device: None (Room air)    Vitals:    04/13/18 1045 04/14/18 0654 04/15/18 0559   Weight: 77.5 kg (170 lb 13.7 oz) 77 kg (169 lb 12.1 oz) 79.3 kg (174 lb 13.2 oz)     Vital Signs with Ranges  Temp:  [97  F (36.1  C)-99.3  F (37.4  C)] 98.6  F (37  C)  Heart Rate:  [] 68  Resp:  [4-29] 20  BP: (122-191)/(49-87) 122/49  SpO2:  [92 %-100 %] 92 %  I/O last 3 completed shifts:  In: 6353 [P.O.:1960; I.V.:4393]  Out: 800 [Urine:700; Blood:100]    Pressure Ulcer 08/02/16 Proximal Coccyx (Active)   Number of days:621       Pressure Ulcer 08/03/16 Right Hip reddened area with small opening-oozing (Active)   Number of days:620       Pressure Injury 10/11/16 Posterior Buttocks Right cheek cyst looking sore (Active)   Number of days:551       Pressure Injury 10/11/16 Posterior Perineum Sacrum, perineum, penis and scrotum (Active)   Number of days:551       Pressure Injury 10/12/16 Left;Distal;Posterior Leg Left lower leg/ankle approx.  5\" long wound (Active)   Number of days:550       Wound 07/23/16 Bilateral;Lower Leg Ulceration chronic bilateral lower extremity venous stasis changes open weeping wound (serosang) on lateral aspect of LLE, weeping drainage from Left lateral heel wound.  (Active)   Number of days:631     "   Wound 12/16/17 Posterior;Proximal Coccyx Suspected pressure ulcer White wound base with blanchable erythema  (Active)   Site Assessment [facility] 4/14/2018 11:00 PM   Gena-wound Assessment [facility] 4/14/2018 11:00 PM   Size - Length x Width x Depth (cm) 1pyF4on 4/13/2018 10:45 AM   Drainage Amount Large 4/14/2018 11:00 PM   Drainage Color/Charcteristics Serosanguinous 4/14/2018 11:00 PM   Dressing Dry gauze 4/14/2018 11:00 PM   Dressing Status Clean, dry, intact 4/15/2018  9:47 AM   Staging (WOC) Unstagable 4/13/2018  4:30 PM   Wound Description (WO) Full thickness 4/13/2018 10:45 AM   Number of days:120       Wound 12/16/17 Anterior;Left;Right Perineum Extravasation L and R extravasation perineal folds (Active)   Number of days:120       Incision/Surgical Site 04/14/18 Left Buttocks (Active)   Incision Assessment [facility] 4/15/2018  9:47 AM   Gena-Incision Assessment [facility] 4/15/2018  9:47 AM   Incision Drainage Amount Large 4/14/2018 11:00 PM   Drainage Description Serosanguinous 4/14/2018 11:00 PM   Dressing Intervention Clean, dry, intact 4/15/2018  9:47 AM   Number of days:1     Line/device assessment(s) completed for medical necessity    Constitutional: Awake,no acute distress. Interactive.  Respiratory: Diminished bases, no crackles, rhonchi or wheezing. Easy respirations with equal chest rise and fall.   Cardiovascular: HRR, no murmurs, rubs, thrills. Some nonpitting peripheral edema per his usual chronic state.  GI: Soft, nontender, bowel sounds hypoactive   Skin/Integumen: Chronic lichenization bilateral lower legs and feet with areas of clearing with pink skin beneath. No open or draining areas present. Coccyx wound covered with ABD dressing with some serous drainage shadowing noted.       Medications     Warfarin Therapy Reminder       - MEDICATION INSTRUCTIONS -       lactated ringers 125 mL/hr at 04/14/18 1150       potassium chloride  40 mEq Oral BID     warfarin  5 mg Oral ONCE at 18:00     amLODIPine  5 mg Oral  Daily     baclofen (LIORESAL) tablet 10 mg  10 mg Oral TID     gabapentin (NEURONTIN) capsule 100 mg  100 mg Oral TID     ipratropium - albuterol 0.5 mg/2.5 mg/3 mL  3 mL Inhalation 4x daily     levothyroxine (SYNTHROID/LEVOTHROID) tablet 125 mcg  125 mcg Oral Daily     losartan (COZAAR) tablet 100 mg  100 mg Oral Daily     metoprolol succinate (TOPROL-XL) 24 hr tablet 100 mg  100 mg Oral Daily     nicotine  1 patch Transdermal Daily     sodium chloride (PF)  3 mL Intracatheter Q8H     nicotine   Transdermal Daily     nicotine   Transdermal Q8H     pantoprazole  40 mg Oral QAM AC     fluticasone-vilanterol  1 puff Inhalation Daily     piperacillin-tazobactam  3.375 g Intravenous Q6H     vancomycin (VANCOCIN) IV  1,500 mg Intravenous Q18H       Data     Recent Labs  Lab 04/15/18  0659 04/14/18  0637 04/13/18  1355 04/13/18  0840   WBC 8.1 9.8  --  14.9*   HGB 10.9* 12.1*  --  13.6   MCV 84 85  --  86    249  --  310   INR 1.09 1.15 1.81* 2.49*    141  --  141   POTASSIUM 2.8* 3.1*  --  3.7   CHLORIDE 107 105  --  104   CO2 28 29  --  30   BUN 12 19  --  29   CR 1.00 1.04  --  1.41*   ANIONGAP 9 7  --  7   RASHEED 8.0* 8.4*  --  9.0   GLC 87 92  --  109*   ALBUMIN  --   --   --  2.4*   PROTTOTAL  --   --   --  7.0   BILITOTAL  --   --   --  0.6   ALKPHOS  --   --   --  84   ALT  --   --   --  15   AST  --   --   --  11       No results found for this or any previous visit (from the past 24 hour(s)).

## 2018-04-16 NOTE — PROGRESS NOTES
SUBJECTIVE:  Patient was evaluated earlier in the day, but he had refused examination of his wound due to just having the nurse change the dressing a few minutes before. He has not had any chest pain, shortness of breath, fevers or chills. He tolerated that dressing change earlier today.    OBJECTIVE:  Temp: 97.9  F (36.6  C) Temp src: Tympanic BP: 163/78   Heart Rate: 72 Resp: 18 SpO2: 92 % O2 Device: None (Room air)      General: awake, alert and in no acute distress  Buttocks: Right gluteal wound with mild deejay-wound erythema, no purulence along the base. The deepest portion of the wound has some dark/dusky muscle tissue overlying the ischial tuberosity. The circumference of the wound has pink, healthy, viable tissue.     ASSESSMENT/PLAN:  72 year old male status post debridement of a right ischial pressure ulcer    Continue dressing changes BID with wet to dry dressings  May need further sharp debridement and then VAC therapy to help with granulation vs BID dressing changes  Will continue to monitor for any changes  Please call with questions or concerns    Jorge Kaur  4/15/2018

## 2018-04-16 NOTE — PLAN OF CARE
Problem: Patient Care Overview  Goal: Plan of Care/Patient Progress Review  Outcome: No Change  Received orders for OT evaluation. Pt has been using a bam at home and has spouse providing assistance with all ADLs. Plan is for pt to d/c to SNF. Will hold orders at this time.

## 2018-04-16 NOTE — PHARMACY-VANCOMYCIN DOSING SERVICE
Pharmacy Vancomycin Note  Date of Service 2018  Patient's  [id number]   72 year old, male    Indication: Skin and Soft Tissue Infection  Goal Trough Level: 15-20 mg/L  Day of Therapy: 4  Current Vancomycin regimen:  1500 mg IV q18h, though with delays in doses given, dosing has been more like every 24 hours      Current estimated CrCl = Estimated Creatinine Clearance: 77.4 mL/min (based on Cr of 0.97).    Creatinine for last 3 days  2018:  8:40 AM Creatinine 1.41 mg/dL  2018:  6:37 AM Creatinine 1.04 mg/dL  4/15/2018:  6:59 AM Creatinine 1.00 mg/dL  2018:  6:42 AM Creatinine 0.97 mg/dL    Recent Vancomycin Levels (past 3 days)  2018:  6:42 AM Vancomycin Level 25.4 mg/L  Trough drawn 17 hours after last dose.  If estimated every 24 hours dosing (which is more indicative of the dosing that has been happening with delays, trough can be extrapolated to approximately 15.7 mg/L.    Vancomycin IV Administrations (past 72 hours)                   vancomycin (VANCOCIN) 1,500 mg in D5W 500 mL intermittent infusion (mg) 1,500 mg New Bag 04/15/18 1348     1,500 mg New Bag 18 1449     1,500 mg New Bag 18 1641                Nephrotoxins and other renal medications (Future)    Start     Dose/Rate Route Frequency Ordered Stop    18 1300  vancomycin (VANCOCIN) 1,500 mg in D5W 500 mL intermittent infusion      1,500 mg  376.7 mL/hr over 1.5 Hours Intravenous EVERY 24 HOURS 18 0837      18 1530  piperacillin-tazobactam (ZOSYN) infusion 3.375 g      3.375 g  100 mL/hr over 30 Minutes Intravenous EVERY 6 HOURS 18 1401               Contrast Orders - past 72 hours     None          Interpretation of levels and current regimen:  Trough level is  Therapeutic if you consider dosing to be every 24 hours, since trough was drawn early, and dosing had been delayed    Has serum creatinine changed > 50% in last 72 hours: No    Urine output:  unable to determine    Renal  Function: Improving    Plan:  1.  Decrease Dose to 1500 mg IV every 24 hours  2.  Pharmacy will check trough levels as appropriate in 1-3 Days.    3. Serum creatinine levels will be ordered daily for the first week of therapy and at least twice weekly for subsequent weeks.      Carla Bobby        .

## 2018-04-16 NOTE — CONSULTS
I spoke with Ai Nunez CNP and Dr. Mitchell earlier in the day regarding consult orders. Dr. Mitchell does not feel we need to follow pt for the pressure injury to his buttocks. ANGEL Cloud would like an evaluation for his LE's and recommendations for tx. She is okay to have this done tomorrow prior to D/C. In the past pt has not wanted to follow WOC recommendations.

## 2018-04-16 NOTE — PLAN OF CARE
Problem: Patient Care Overview  Goal: Plan of Care/Patient Progress Review  Outcome: No Change  VSS, afebrile. Lungs CTA but diminished throughout. Continually calls out in pain prior to any contact, states that he is nervous something is going to hurt as opposed to it actually hurting at that moment. Pain managed w/PO Barnett q6 hrs.  K+ level 3.2 this morning, PO 40 meq replacement given. Dressing changed, moderate serosanguinous drainage with foul odor noted on old dressing, abd pad saturated but no blood present on abd pad. Packed with moist Kerlix 3/4 roll, topped with dry 4x4 gauze and abd pad, secured upper border with medipore tape. Golvo lift used to transfer pt to chair to be up for lunch, was up for approx 1hr, then repositioned back into bed. RLE larger than LLE.  Turned q2h. Incontinent of soft stools 2x this shift, BS active q4 quads. Voiding into urinal w/o difficulty. Abx IV Zosyn and Vancomycin.     Face to face report given with opportunity to observe patient.    Report given to Delmar HAWKINS.     Shanell Valerio   4/16/2018  4:29 PM

## 2018-04-16 NOTE — PLAN OF CARE
"Problem: Patient Care Overview  Goal: Plan of Care/Patient Progress Review  Outcome: Improving  /66 (BP Location: Right arm)  Pulse 66  Temp 99.2  F (37.3  C) (Tympanic)  Resp 16  Ht 1.803 m (5' 11\")  Wt 79.3 kg (174 lb 13.2 oz)  SpO2 94%  BMI 24.38 kg/m2    Pt alert and disoriented to situation. C/O buttocks pain only with turns. Lungs clear. BS active. HR regular. Dressings to coccyx and BLE C/D/I . Zosyn infused this shift. IVF@75. T/R q2h. Small amounts of BM with turns. Very calm and much less agitated than prior nights. No falls or injuries this shift. Will continue to monitor.     Face to face report given with opportunity to observe patient.    Report given to ROSS Reece and ROSS Daniel   4/16/2018  7:41 AM      "

## 2018-04-16 NOTE — PROGRESS NOTES
[facility]    Hospitalist Progress Note    Date of Service (when I saw the patient): 04/16/2018    Assessment & Plan   Seun Emanuel is a 72 year old male who was admitted on 4/13/2018.      Infected left decubitus ulcer:  The patient is basically bed bound in the past, has not had any major ulcerations, but this is certainly a severe one.  Apparently, the patient has been digging at it, and has bone exposed. He has no obvious signs of sepsis,remains hemodynamically stable.  No leukocytosis or fevers, Lactic acid is normal.  Zosyn and Vancomycin started on arrival prior to culture of wound. Today results show morganella, e. Coli and MSSA. Tissue culture gathered during debridement shows grmanegative rods only. Will continue Vancomycin and Zosyn to cover all three. He is allergic to Bactrim so will need dual agents until cleared. Because ulcer is down to the bone and further surgical debridement is planned, will continue with Vanco and Zosyn and switch over to orals once closer to discharge. I&D was done by Dr. Mitchell 4/14, currently doing wet to dry dressings.  Family is interested in hospice on discharge so will have care transitions notify them (he is already set up with palliative care).    Bacteremia: 2/4 bottles positive for gram positive cocci, now showing coag negative staph-most likely contaminant. His WBC is normal and is not toxic, no signs of sepsis. Repeat culture done this morning. Continue Zosyn and Vanco as above Will repeat cultures in AM 48 hours after treatment started.      Elevated INR:  He is on Coumadin for previous DVT.  Reversed with Vit K in preparation for I&D, coumadin restarted 4/15 with approval of surgery, pharmacy dosing.      Acute on chronic kidney injury:  BUN and creatinine are above what his baseline of 0.9 to 1.10 on arrival.  Appeared to be somewhat dry on examination.  He received IVF overnight and renal panel is now back to baseline and has been stable there.      Chronic venous stasis changes of his legs:  Surprisingly, they actually look very good.  In the past he has had numerous rounds of cellulitis.  There is no obvious cellulitis at this time; has minimal edema compared to previously. Chronic lichenization.    History of cerebrovascular accident: Remains at baseline     Hypertension:  He is on losartan, lisinopril, beta blocker and Norvasc.  Will continue with exception of holding lisinopril as he is already on ARB. Blood pressures have been stable.       # Pain Assessment:  Current Pain Score 4/16/2018   Patient currently in pain? yes   Pain score (0-10) -   Pain location -   Pain descriptors -   Seun carney pain level was assessed and he currently denies pain.        DVT Prophylaxis: Pneumatic Compression Devices  Code Status: DNR/DNI    Disposition: Expected discharge in 2-3 days once bacteremia cleared, hemodynamically stable.    Ai Nunez, CNP    Interval History   Not happy this morning, refusing nebs, breakfast taking too long to get here. He denies shortness of breath or chest pain, no abdominal pain. Very awake and alert this morning.     -Data reviewed today: I reviewed all new labs and imaging results over the last 24 hours.    Physical Exam   Temp: 99.2  F (37.3  C) Temp src: Tympanic BP: 175/77 Pulse: 75 Heart Rate: 78 (monitor) Resp: 18 SpO2: 96 % O2 Device: None (Room air)    Vitals:    04/14/18 0654 04/15/18 0559 04/16/18 0547   Weight: 77 kg (169 lb 12.1 oz) 79.3 kg (174 lb 13.2 oz) 80.7 kg (177 lb 14.6 oz)     Vital Signs with Ranges  Temp:  [97.9  F (36.6  C)-99.2  F (37.3  C)] 99.2  F (37.3  C)  Pulse:  [75] 75  Heart Rate:  [72-78] 78  Resp:  [16-18] 18  BP: (163-175)/(66-78) 175/77  SpO2:  [93 %-96 %] 96 %  I/O last 3 completed shifts:  In: 2721 [P.O.:840; I.V.:1881]  Out: -     Peripheral IV 04/15/18 Left Upper arm (Active)   Site Assessment WDL 4/16/2018  2:00 AM   Line Status Infusing 4/16/2018  2:00 AM   Phlebitis Scale 0-->no symptoms  "4/16/2018  2:00 AM   Infiltration Scale 0 4/16/2018  2:00 AM   Number of days:1       Pressure Ulcer 08/02/16 Proximal Coccyx (Active)   Number of days:622       Pressure Ulcer 08/03/16 Right Hip reddened area with small opening-oozing (Active)   Number of days:621       Pressure Injury 10/11/16 Posterior Buttocks Right cheek cyst looking sore (Active)   Number of days:552       Pressure Injury 10/11/16 Posterior Perineum Sacrum, perineum, penis and scrotum (Active)   Number of days:552       Pressure Injury 10/12/16 Left;Distal;Posterior Leg Left lower leg/ankle approx.  5\" long wound (Active)   Number of days:551       Wound 07/23/16 Bilateral;Lower Leg Ulceration chronic bilateral lower extremity venous stasis changes open weeping wound (serosang) on lateral aspect of LLE, weeping drainage from Left lateral heel wound.  (Active)   Number of days:632       Wound 12/16/17 Posterior;Proximal Coccyx Suspected pressure ulcer White wound base with blanchable erythema  (Active)   Site Assessment [place] 4/16/2018  1:51 AM   Gena-wound Assessment [facility] 4/16/2018  1:51 AM   Size - Length x Width x Depth (cm) [id number] 4/13/2018 10:45 AM   Drainage Amount Scant 4/16/2018  1:51 AM   Drainage Color/Charcteristics Serosanguinous 4/16/2018  1:51 AM   Dressing Dry gauze 4/16/2018  1:51 AM   Dressing Status Clean, dry, intact 4/16/2018  1:51 AM   Staging (WOC) Unstagable 4/13/2018  4:30 PM   Wound Description (WOC) Full thickness 4/13/2018 10:45 AM   Number of days:121       Wound 12/16/17 Anterior;Left;Right Perineum Extravasation L and R extravasation perineal folds (Active)   Number of days:121       Incision/Surgical Site 04/14/18 Left Buttocks (Active)   Incision Assessment UT 4/16/2018  1:51 AM   Gena-Incision Assessment [facility] 4/16/2018  1:51 AM   Incision Drainage Amount Moderate 4/15/2018  7:00 PM   Drainage Description Serosanguinous 4/15/2018  7:00 PM   Dressing Intervention Clean, dry, intact 4/16/2018  1:51 AM   Number of " days:2     Line/device assessment(s) completed for medical necessity    Constitutional: Awake,no acute distress. Interactive.  Respiratory: Diminished bases, no crackles, rhonchi or wheezing. Easy respirations with equal chest rise and fall.   Cardiovascular: HRR, no murmurs, rubs, thrills. Some nonpitting peripheral edema per his usual chronic state.  GI: Soft, nontender, bowel sounds hypoactive   Skin/Integumen: Chronic lichenization bilateral lower legs and feet with areas of clearing with pink skin beneath. For some reason his legs have now been wrapped from foot to knee, there is an abdominal pad on the left lower leg. Coccyx wound covered with ABD dressing with some serous drainage shadowing noted.       Medications     Warfarin Therapy Reminder       - MEDICATION INSTRUCTIONS -       lactated ringers 75 mL/hr at 04/16/18 0123       potassium chloride SA  40 mEq Oral BID     vancomycin (VANCOCIN) IV  1,500 mg Intravenous Q24H     warfarin  5 mg Oral ONCE at 18:00     amLODIPine  5 mg Oral Daily     baclofen (LIORESAL) tablet 10 mg  10 mg Oral TID     gabapentin (NEURONTIN) capsule 100 mg  100 mg Oral TID     ipratropium - albuterol 0.5 mg/2.5 mg/3 mL  3 mL Inhalation 4x daily     levothyroxine (SYNTHROID/LEVOTHROID) tablet 125 mcg  125 mcg Oral Daily     losartan (COZAAR) tablet 100 mg  100 mg Oral Daily     metoprolol succinate (TOPROL-XL) 24 hr tablet 100 mg  100 mg Oral Daily     nicotine  1 patch Transdermal Daily     sodium chloride (PF)  3 mL Intracatheter Q8H     nicotine   Transdermal Daily     nicotine   Transdermal Q8H     pantoprazole  40 mg Oral QAM AC     fluticasone-vilanterol  1 puff Inhalation Daily     piperacillin-tazobactam  3.375 g Intravenous Q6H       Data     Recent Labs  Lab 04/16/18  0642 04/15/18  0659 04/14/18  0637  04/13/18  0840   WBC 9.1 8.1 9.8  --  14.9*   HGB 11.1* 10.9* 12.1*  --  13.6   MCV 85 84 85  --  86    241 249  --  310   INR 1.13 1.09 1.15  < > 2.49*     144 141  --  141   POTASSIUM 3.2* 2.8* 3.1*  --  3.7   CHLORIDE 110* 107 105  --  104   CO2 27 28 29  --  30   BUN 12 12 19  --  29   CR 0.97 1.00 1.04  --  1.41*   ANIONGAP 7 9 7  --  7   RASHEED 7.8* 8.0* 8.4*  --  9.0   * 87 92  --  109*   ALBUMIN  --   --   --   --  2.4*   PROTTOTAL  --   --   --   --  7.0   BILITOTAL  --   --   --   --  0.6   ALKPHOS  --   --   --   --  84   ALT  --   --   --   --  15   AST  --   --   --   --  11   < > = values in this interval not displayed.    No results found for this or any previous visit (from the past 24 hour(s)).

## 2018-04-16 NOTE — PLAN OF CARE
Problem: Patient Care Overview  Goal: Plan of Care/Patient Progress Review  Outcome: No Change  Pt up to chair beginning of shift. Placed a air cushion on chair for comfort. Wife here with pt. Pt did eat all his supper and tolerated well. Pain controlled. Premedicated pt with 1 norco ( see mar flowsheet) for prior to dressing changes, dr diaz came and saw the wound bed. Wet to dry dressing applied. Pt cooperative with cares tonight and took medications without difficulty. Ate a snack of cookies and diet coke this evening. Incontinent this shift of urine.  Pt repositioned q 2 hours and prn.      Problem: Wound (Includes Pressure Injury) (Adult)  Goal: Signs and Symptoms of Listed Potential Problems Will be Absent, Minimized or Managed (Wound)  Signs and symptoms of listed potential problems will be absent, minimized or managed by discharge/transition of care (reference Wound (Includes Pressure Injury) (Adult) CPG).   Outcome: No Change   04/15/18 1607   Wound (Includes Pressure Injury)   Problems Assessed (Wound) all   Problems Present (Wound) pain     Dr. Diaz, here to see wound. Dressing changed tonight per md order.  Pt tolerated well. Premedicated pt with 1 norco ( see mar).    Problem: Skin and Soft Tissue Infection (Adult)  Goal: Signs and Symptoms of Listed Potential Problems Will be Absent, Minimized or Managed (Skin and Soft Tissue Infection)  Signs and symptoms of listed potential problems will be absent, minimized or managed by discharge/transition of care (reference Skin and Soft Tissue Infection (Adult) CPG).   Outcome: No Change  Pt up in chair via bam using an air support chair pad. Ate supper in chair. Wife helped pt eat.  Pt being turned and repositioned q 2 hours and as needed.    Problem: Chronic Respiratory Difficulty Comorbidity  Goal: Chronic Respiratory Difficulty  Patient comorbidity will be monitored for signs and symptoms of Respiratory Difficulty (Chronic) condition.  Problems  will be absent, minimized or managed by discharge/transition of care.   Outcome: No Change  Pt on room air.    Face to face report given with opportunity to observe patient.    Report given to ondina savage   4/16/2018  12:42 AM

## 2018-04-16 NOTE — PLAN OF CARE
DATE:  4/16/2018   TIME OF RECEIPT FROM LAB:  07:17  LAB TEST:  Vanco  LAB VALUE:  25.4  RESULTS GIVEN WITH READ-BACK TO (PROVIDER): Vicki Nunez NP  TIME LAB VALUE REPORTED TO PROVIDER:   07:33

## 2018-04-16 NOTE — PROGRESS NOTES
Ok to discharge from surgery standpoint, no plans for further debridement at this time, does not appear septic, agree with going to skilled nursing facility for discharge. Decubitus ulcer unlikely to heal completely, goal to keep him from becoming sick from it.     Sampson Mitchell

## 2018-04-16 NOTE — PROGRESS NOTES
Met with Seun's wife Kimmy, she states that we can look for SNF's as far away as [place], she has a daughter there. Referrals made:    [facility]                     No       due to patient acuity       04/16/18  [facility]                   No        No appropriate beds      04/16/18  [facility]               No        Do not have appropriate beds for Seun Amaya            No        Due to high patient acuity   04/16/2018  [facility]       No        They feel he would be better served in a SNF  [facility]  [facility]  [place]    Plan is to go to SNF for wound care and rehab, if wound does not heal then Hospice at home. CM will remain available.

## 2018-04-16 NOTE — PLAN OF CARE
Problem: Patient Care Overview  Goal: Plan of Care/Patient Progress Review  Outcome: No Change  Order received for PT eval prior to DC to SNF.  Pt's baseline is a bam lift at home for all transfers and pt has a power w/c, this has been his baseline for a relatively long period of time.  At this time PT eval not indicated as he will need to continue to be bam at SNF or home pending what most appropriate DC destination is from medical standpoint.

## 2018-04-16 NOTE — PHARMACY
[facility]    Pharmacy      Antimicrobial Stewardship Note     Current antimicrobial therapy:  Anti-infectives (Future)    Start     Dose/Rate Route Frequency Ordered Stop    04/16/18 1300  vancomycin (VANCOCIN) 1,500 mg in D5W 500 mL intermittent infusion      1,500 mg  376.7 mL/hr over 1.5 Hours Intravenous EVERY 24 HOURS 04/16/18 0837      04/13/18 1530  piperacillin-tazobactam (ZOSYN) infusion 3.375 g      3.375 g  100 mL/hr over 30 Minutes Intravenous EVERY 6 HOURS 04/13/18 1401            Indication: SSTI    Days of Therapy: 4     Pertinent labs:  Creatinine   Creatinine   Date Value Ref Range Status   04/16/2018 0.97 0.66 - 1.25 mg/dL Final   04/15/2018 1.00 0.66 - 1.25 mg/dL Final   04/14/2018 1.04 0.66 - 1.25 mg/dL Final     WBC   WBC   Date Value Ref Range Status   04/16/2018 9.1 4.0 - 11.0 10e9/L Final   04/15/2018 8.1 4.0 - 11.0 10e9/L Final   04/14/2018 9.8 4.0 - 11.0 10e9/L Final     Procalcitonin No results found for: PCAL  CRP   CRP Inflammation   Date Value Ref Range Status   04/16/2018 78.3 (H) 0.0 - 8.0 mg/L Final   04/13/2018 246.0 (H) 0.0 - 8.0 mg/L Final   12/16/2017 20.4 (H) 0.0 - 8.0 mg/L Final       Culture Results:    Procedure Component Value Units Date/Time     Blood culture [[id number]] Collected: 04/16/18 0654     Order Status: Completed Lab Status: In process Updated: 04/16/18 0655     Specimen: Blood      Blood culture [[id number]] Collected: 04/16/18 0641     Order Status: Completed Lab Status: In process Updated: 04/16/18 0642     Specimen: Blood      Anaerobic bacterial culture [[id number]] Collected: 04/14/18 1025     Order Status: Completed Lab Status: Preliminary result Updated: 04/15/18 1112     Specimen: Tissue from Buttock       Specimen Description Left Buttock      Culture Micro Culture in progress     Gram stain [[id number]] (Abnormal) Collected: 04/14/18 1025     Order Status: Completed Lab Status: Edited Result - FINAL Updated: 04/15/18 0936     Specimen: Tissue from Buttock        Specimen Description Left Buttock      Gram Stain Many   PMNs seen          Moderate   Gram positive cocci    (A)     Tissue Culture Aerobic Bacterial [[id number]] (Abnormal) Collected: 04/14/18 1025     Order Status: Completed Lab Status: Preliminary result Updated: 04/15/18 1014     Specimen: Tissue from Buttock       Specimen Description Left Buttock      Culture Micro Moderate growth   Non lactose fermenting gram negative rods   Identification and susceptibility to follow.    (A)       Light growth   Lactose fermenting gram negative rods   Identification and susceptibility to follow.    (A)     Blood culture [[id number]] Collected: 04/13/18 0914     Order Status: Completed Lab Status: Preliminary result Updated: 04/16/18 0626     Specimen: Blood       Specimen Description Blood      Culture Micro No growth after 3 days     Wound Culture Aerobic Bacterial [[id number]] (Abnormal)  Collected: 04/13/18 0840     Order Status: Completed Lab Status: Preliminary result Updated: 04/16/18 0822     Specimen: Left Buttock       Specimen Description Left Buttock Ulcer      Culture Micro Heavy growth   Morganella morganii ssp. morganii    (A)       Light growth   Escherichia coli    (A)       Light growth   Staphylococcus aureus    (A)     Culture & Susceptibility      ESCHERICHIA COLI      Antibiotic Interpretation Sensitivity Unit Method Status     AMPICILLIN Sensitive 8 ug/mL ELPIDIO Preliminary     AMPICILLIN/SULBACTAM Sensitive 4 ug/mL ELPIDIO Preliminary     CEFEPIME Sensitive <=1 ug/mL ELPIDIO Preliminary     CEFTAZIDIME Sensitive <=1 ug/mL ELPIDIO Preliminary     CEFTRIAXONE Sensitive <=1 ug/mL ELPIDIO Preliminary     CIPROFLOXACIN Sensitive 0.5 ug/mL ELPIDIO Preliminary     GENTAMICIN Sensitive <=1 ug/mL ELPIDIO Preliminary     IMIPENEM Sensitive <=0.25 ug/mL ELPIDIO Preliminary     LEVOFLOXACIN Sensitive 1 ug/mL ELPIDIO Preliminary     Piperacillin/Tazo Sensitive <=4 ug/mL ELPIDIO Preliminary     TOBRAMYCIN Sensitive <=1 ug/mL ELPIDIO Preliminary      Trimethoprim/Sulfa Sensitive <=1/19 ug/mL ELPIDIO Preliminary                 MORGANELLA MORGANII SSP. MORGANII      Antibiotic Interpretation Sensitivity Unit Method Status     AMPICILLIN Resistant >=32 ug/mL ELPIDIO Preliminary     AMPICILLIN/SULBACTAM Resistant >=32 ug/mL ELPIDIO Preliminary     CEFEPIME Sensitive 4 ug/mL ELPIDIO Preliminary     CEFTAZIDIME Intermediate 8 ug/mL ELPIDIO Preliminary     CEFTRIAXONE Sensitive <=1 ug/mL ELPIDIO Preliminary     CIPROFLOXACIN Sensitive 0.5 ug/mL ELPIDIO Preliminary     GENTAMICIN Sensitive <=1 ug/mL ELPIDIO Preliminary     IMIPENEM Resistant 4 ug/mL ELPIDIO Preliminary     LEVOFLOXACIN Sensitive 2 ug/mL ELPIDIO Preliminary     Piperacillin/Tazo Sensitive <=4 ug/mL ELPIDIO Preliminary     TOBRAMYCIN Sensitive <=1 ug/mL ELPIDIO Preliminary     Trimethoprim/Sulfa Sensitive <=1/19 ug/mL ELPIDIO Preliminary                 STAPHYLOCOCCUS AUREUS      Antibiotic Interpretation Sensitivity Unit Method Status     CLINDAMYCIN Resistant >=8 ug/mL ELPIDIO Preliminary     ERYTHROMYCIN Resistant >=8 ug/mL ELPIDIO Preliminary     GENTAMICIN Sensitive <=0.5 ug/mL ELPIDIO Preliminary     OXACILLIN Sensitive 0.5 ug/mL ELPIDIO Preliminary     PENICILLIN Resistant 0.12 ug/mL ELPIDIO Preliminary     TETRACYCLINE Resistant >=16 ug/mL ELPIDIO Preliminary     Trimethoprim/Sulfa Sensitive <=0.5/9.5 ug/mL ELPIDIO Preliminary     VANCOMYCIN Sensitive 1 ug/mL ELPIDIO Preliminary                           Gram stain [[id number]] (Abnormal) Collected: 04/13/18 0840     Order Status: Completed Lab Status: Final result Updated: 04/13/18 1404     Specimen: Left Buttock       Specimen Description Left Buttock Ulcer      Gram Stain No PMNs seen       Many   Gram positive cocci    (A)     Blood culture [[id number]] (Abnormal) Collected: 04/13/18 0840     Order Status: Completed Lab Status: Preliminary result Updated: 04/16/18 0658     Specimen: Blood       Specimen Description Blood Left Arm      Culture Micro 2 of 2 bottles   Coagulase negative Staphylococcus   Susceptibility testing not  routinely done    (A)          Recommendations/Interventions:  1. Provider is switching to Bactrim starting tomorrow, an appropriate choice as all identified organisms are susceptible. (Correction: patient is allergic to sulfa drugs, so Bactrim is contraindicated. Zosyn and vancomycin are appropriate treatment.)    Jace Holloway, [facility]  April 16, 2018

## 2018-04-17 NOTE — PLAN OF CARE
Problem: Patient Care Overview  Goal: Plan of Care/Patient Progress Review  Outcome: No Change  VSS, HR regular. Lung sounds clear except LLL fine crackles present. A&O but blunted affect throughout this shift, at 14:00 assessment increased confusion, pt stating he needed to get up and go to work. Sterile dressing changed wet to dry, moistened Kerlix packed into wound, topped with 4X4 gauze and abd pad, secured with medipore tape. Old dressing draining small amount serosanguinous, odorous drainage. Wound bed pink with areas of tan and darker brown sloughing, smooth area over bone remains the same as previous day's assessment with tan/brown periosteal membrane in tact. Wound edges erythematous with areas of yellow. Whole of buttocks remains reddened but blanchable. Leg wounds wrapped by wound care with dry gauze over weeping areas and Kerlix, secured with tape. BS active q4 quads. Incontinent of urine and stool, will intermittently accept attempting to use urinal prior to turn and repo. Turn and repo q2h.     Face to face report given with opportunity to observe patient.    Report given to Delmar HAWKINS.    Shanell Valerio   4/17/2018  3:22 PM

## 2018-04-17 NOTE — CONSULTS
Met with wife Kimmy and  Kirstin WALLER regarding transition home with hospice, discussed the hospice philosophy and wife and client's wishes at length, wife understanding of poor quality of life and prolonged suffering, deterioration in condition and wishes for client to be brought home and have comfort care administered. Hospital to discuss transportation arrangements for home. Zoila HAWKINS and Katelyn RN to admit client to hospice services tomorrow at home.

## 2018-04-17 NOTE — PLAN OF CARE
Problem: Patient Care Overview  Goal: Plan of Care/Patient Progress Review  Outcome: No Change  Reason for hospital stay:  Infected pressure ulcer    Pain Management:  Patient moaning and c/o buttock wound pain. Gave IV dilaudid x2 prior to dressing changes and PO Norco x1 with effective pain relief.     Orientation:  A+O x3 with confusion at times.     Respiratory:  Lungs with expiratory wheezes to bilateral bases. Sats 95% on RA    GI:  BS present x4 - having incontinent loose stools x3 this shift. Good appetite - ate all of supper.     :  Incontinent of urine  but does ask for urinal at times.     Skin Issues:  Dressing changed to buttock wound x2 this shift due to fecal contamination. Cleaned wound with NS. Noted black areas inside wound bed and small amounts serosanguinous drainage and foul odor on old dressings. New packed wet to dry dressings applied. Barrier cream applied to excoriated areas on sacrum, and bilateral buttocks. Repositioned every 2 hours. Patients wife changed dressings to LE's per patient request.     IVF:  LR infusing at 75mL/hr    ABX:  Continues on IV Vanco and Zosyn    Mobility:  Jer Lift. Up in chair for 1 hour at supper.     Safety:  Alarms on        Face to face report given with opportunity to observe patient.    Report given to Vilma Snow   4/17/2018  1:14 AM       04/16/18 1619   OTHER   Plan Of Care Reviewed With patient;spouse   Plan of Care Review   Progress no change       Problem: Wound (Includes Pressure Injury) (Adult)  Goal: Signs and Symptoms of Listed Potential Problems Will be Absent, Minimized or Managed (Wound)  Signs and symptoms of listed potential problems will be absent, minimized or managed by discharge/transition of care (reference Wound (Includes Pressure Injury) (Adult) CPG).   Outcome: No Change   04/16/18 1618   Wound (Includes Pressure Injury)   Problems Assessed (Wound) all   Problems Present (Wound) delayed wound  healing;infection;pain;situational response       Problem: Skin and Soft Tissue Infection (Adult)  Goal: Signs and Symptoms of Listed Potential Problems Will be Absent, Minimized or Managed (Skin and Soft Tissue Infection)  Signs and symptoms of listed potential problems will be absent, minimized or managed by discharge/transition of care (reference Skin and Soft Tissue Infection (Adult) CPG).   Outcome: No Change   04/16/18 1618   Skin and Soft Tissue Infection   Problems Assessed (Skin and Soft Tissue Infection) all   Problems Present (Skin/Soft Tissue Inf) pain;situational response

## 2018-04-17 NOTE — PROGRESS NOTES
[facility]    Hospitalist Progress Note    Date of Service (when I saw the patient): 04/17/2018    Assessment & Plan   Seun Emanuel is a 72 year old man who was admitted on 4/13/2018.      Infected left decubitus ulcer:    The patient is basically bed bound in the past, has not had any major prior ulcerations, but this is certainly severity of this is high.  Apparently, the patient has been digging at it, and has connective tissue/bone exposed. He has no obvious signs of systemic infection/sepsis sepsis, and has remained hemodynamically stable.  No leukocytosis or fevers, Lactic acid is normal.  Zosyn and Vancomycin started on arrival prior to culture of wound.  Cultures both of the wound itself as well as cultures obtained at surgical debridement has shown multiple organisms including a Morganella species, E. coli, pseudomonas aeruginosa, and enterococcus, unfortunately few attractive options.  Intravenous access extremely difficult and no single oral agent is attractive.  He has what appears to be a significant adverse reaction to trimethoprim/sulfa with significant GI complications as well as difficulty breathing.  As below he and wife are approaching issues of palliative care or hospice.  Space in any event is undergone surgical debridement and with the residual clean wound base it is not certain any antibiotic treatment is in fact indicated.  If absolutely required could consider intramuscular aminoglycosides although this would require drug monitoring and the treatment itself is somewhat burdensome.  Quinolones not attractive is they show only intermediate sensitivity to at least 1 of the organisms.  Have discussed with the patient and wife at some length this morning including options of no antibiotic and consequences of antibiotic treatment itself.  We have agreed to proceed to not continue antibiotics after the 4 day course she is already received.      Bacteremia:   2/4 initial blood  cultures bottles positive for coagulase-negative Staphylococcus-most likely contaminant.  Several subsequent blood cultures have been nonreactive.  His WBC is normal and is not toxic, no signs of sepsis.  Would not be inclined to continue treatment directed at this bacteremia    Induced coagulopathy:    He is on Coumadin for previous DVT.  Reversed with Vit K in preparation for I&D, coumadin restarted 4/15 with approval of surgery, pharmacy dosing.  At this point reasonable to continue although if monitoring her other issues becomes burdensome at all, given considerations for end-of-life planning as below, would not be unreasonable to simply discontinue anticoagulation.    Acute on chronic kidney injury:    BUN and creatinine are above what his baseline of 0.9 to 1.10 on arrival.    Certainly initially met most sensitive criteria for acute kidney injury.  Initially did appear to be volume depleted with improvement in renal function although still not at his baseline.  Suspect that with multiple issues chronic kidney disease has progressed somewhat.  In any event no reason to continue in the treatment beyond supportive care particularly encouraging fluid intake.       Chronic venous stasis changes of his legs:    Compared to prior presentations this appears quite well compensated. In the past he has had numerous rounds of cellulitis.  There is no obvious cellulitis at this time; has minimal edema compared to previously. Chronic lichenization.  Evaluated by wound care today who suggested only Kerlex to minimize any scaling of skin with absorbent pads if he has any drainage.    History of cerebrovascular accident: Remains at baseline    Primary hypertension:    Continuing losartan, lisinopril, beta blocker and Norvasc.  Will continue with exception of discontinuing lisinopril as he is already on ARB. Blood pressures have been stable.     End-of-life planning and advanced directives  I discussed with the patient and his  wife limited options for ongoing treatment and issues of treatment that becomes excessively burdensome.  Vicki Nunez was also able to discuss these issues with them  Previously. overall while he and wife remain optimistic that wounds may heal both acknowledge that this is in fact unlikely and that increasingly treatment directed towards comfort seems reasonable.  unfortunately options for skilled nursing are extremely limited in both feel that a plan of him returning home with as much support as possible would be best for him.  On discussion with options of palliative care and hospice they are agreeable.  Space have asked palliative care team to evaluate the patient and present them the options that this treatment would provide them particularly in terms of supports available at home.        # Pain Assessment:  Current Pain Score 4/17/2018   Patient currently in pain? denies   Pain score (0-10) 2   Pain location Buttocks   Pain descriptors Pinky carney pain level was assessed and he currently denies pain.        DVT Prophylaxis: Pneumatic Compression Devices  Code Status: DNR/DNI    Disposition: Expected discharge in another day once palliative care team is been able to evaluate him and its certain oral intake is adequate now that intravenous access not possible.      Interval History   Awake and alert.  Quite unhappy that he is not able to return home today.  No dyspnea.  No significant pain.    -Data reviewed today: I reviewed all new labs and imaging results over the last 24 hours.    Physical Exam   Temp: 99.1  F (37.3  C) Temp src: Tympanic BP: 177/75 Pulse: 66 Heart Rate: 71 Resp: 19 SpO2: 94 % O2 Device: None (Room air)    Vitals:    04/15/18 0559 04/16/18 0547 04/17/18 0500   Weight: 79.3 kg (174 lb 13.2 oz) 80.7 kg (177 lb 14.6 oz) 80 kg (176 lb 5.9 oz)     Vital Signs with Ranges  Temp:  [99.1  F (37.3  C)-99.4  F (37.4  C)] 99.1  F (37.3  C)  Pulse:  [66] 66  Heart Rate:  [71-76] 71  Resp:  [16-22]  "19  BP: (168-177)/(70-78) 177/75  SpO2:  [93 %-95 %] 94 %  I/O last 3 completed shifts:  In: 2708 [P.O.:1260; I.V.:1448]  Out: 1125 [Urine:1125]    Pressure Ulcer 08/02/16 Proximal Coccyx (Active)   Number of days:623       Pressure Ulcer 08/03/16 Right Hip reddened area with small opening-oozing (Active)   Number of days:622       Pressure Injury 10/11/16 Posterior Buttocks Right cheek cyst looking sore (Active)   Number of days:553       Pressure Injury 10/11/16 Posterior Perineum Sacrum, perineum, penis and scrotum (Active)   Number of days:553       Pressure Injury 10/12/16 Left;Distal;Posterior Leg Left lower leg/ankle approx.  5\" long wound (Active)   Number of days:552       Wound 07/23/16 Bilateral;Lower Leg Ulceration chronic bilateral lower extremity venous stasis changes open weeping wound (serosang) on lateral aspect of LLE, weeping drainage from Left lateral heel wound.  (Active)   Number of days:633       Wound 12/16/17 Posterior;Proximal Coccyx Suspected pressure ulcer White wound base with blanchable erythema  (Active)   Site Assessment Moist;Pink;Slough;Tunneling present;Drainage 4/17/2018  8:24 AM   Gena-wound Assessment Erythema;Maceration 4/17/2018  8:24 AM   Size - Length x Width x Depth (cm) [id number] 4/13/2018 10:45 AM   Drainage Amount Small 4/17/2018  8:24 AM   Drainage Color/Charcteristics Serosanguinous;Odor present 4/17/2018  8:24 AM   Dressing Dry gauze 4/17/2018  8:24 AM   Dressing Status Clean, dry, intact;New dressing 4/17/2018  8:24 AM   Staging (WOC) Unstagable 4/13/2018  4:30 PM   Wound Description (WOC) Full thickness 4/17/2018  8:24 AM   Number of days:122       Wound 12/16/17 Anterior;Left;Right Perineum Extravasation L and R extravasation perineal folds (Active)   Number of days:122       Incision/Surgical Site 04/14/18 Left Buttocks (Active)   Incision Assessment UTV 4/17/2018  2:04 AM   Gena-Incision Assessment UTV 4/17/2018  2:04 AM   Closure Other (Comment) 4/16/2018  8:54 AM "   Incision Drainage Amount Small 4/16/2018  4:18 PM   Drainage Description Serosanguinous;Odor present 4/16/2018  4:18 PM   Dressing Intervention Clean, dry, intact 4/17/2018  2:04 AM   Number of days:3     Line/device assessment(s) completed for medical necessity    Constitutional: Awake,no acute distress.  Irritable.  Minimally distractible.  Respiratory: Generally aeration preserved throughout lung fields although slightly diminished at ed bases, no crackles, rhonchi or wheezing.  Accessory muscles not in use.  Cardiovascular: PMI not displaced.  HRR, no murmurs, rubs, thrills. Some nonpitting peripheral edema per his usual chronic state.  GI: Soft, nontender, bowel sounds hypoactive but present in all quadrants.  Skin/Integumen: Chronic lichenization bilateral lower legs and feet with areas of clearing with pink skin beneath. Coccyx wound covered with ABD dressing with some serous drainage shadowing noted.       Medications     Warfarin Therapy Reminder       - MEDICATION INSTRUCTIONS -         warfarin  2.5 mg Oral ONCE at 18:00     amLODIPine  5 mg Oral Daily     baclofen (LIORESAL) tablet 10 mg  10 mg Oral TID     gabapentin (NEURONTIN) capsule 100 mg  100 mg Oral TID     levothyroxine (SYNTHROID/LEVOTHROID) tablet 125 mcg  125 mcg Oral Daily     losartan (COZAAR) tablet 100 mg  100 mg Oral Daily     metoprolol succinate (TOPROL-XL) 24 hr tablet 100 mg  100 mg Oral Daily     nicotine  1 patch Transdermal Daily     sodium chloride (PF)  3 mL Intracatheter Q8H     nicotine   Transdermal Daily     nicotine   Transdermal Q8H     pantoprazole  40 mg Oral QAM AC     fluticasone-vilanterol  1 puff Inhalation Daily       Data     Recent Labs  Lab 04/17/18  0602 04/16/18  0642 04/15/18  0659  04/13/18  0840   WBC 9.6 9.1 8.1  < > 14.9*   HGB 10.2* 11.1* 10.9*  < > 13.6   MCV 85 85 84  < > 86    271 241  < > 310   INR 1.77* 1.13 1.09  < > 2.49*   * 144 144  < > 141   POTASSIUM 3.9 3.2* 2.8*  < > 3.7    CHLORIDE 114* 110* 107  < > 104   CO2 26 27 28  < > 30   BUN 16 12 12  < > 29   CR 1.08 0.97 1.00  < > 1.41*   ANIONGAP 8 7 9  < > 7   RASHEED 8.0* 7.8* 8.0*  < > 9.0   GLC 94 104* 87  < > 109*   ALBUMIN  --   --   --   --  2.4*   PROTTOTAL  --   --   --   --  7.0   BILITOTAL  --   --   --   --  0.6   ALKPHOS  --   --   --   --  84   ALT  --   --   --   --  15   AST  --   --   --   --  11   < > = values in this interval not displayed.    No results found for this or any previous visit (from the past 24 hour(s)).

## 2018-04-17 NOTE — PLAN OF CARE
AKIRA Ayala from [facility] [employer] called to do a screening on patient; nurse doing a screening on the phone for same patient.  Nurse given number ([phone]) and will call them later when available.

## 2018-04-17 NOTE — PHARMACY
[facility]    Pharmacy      Antimicrobial Stewardship Note     Current antimicrobial therapy:  Anti-infectives (Future)    Start     Dose/Rate Route Frequency Ordered Stop    04/16/18 1300  vancomycin (VANCOCIN) 1,500 mg in D5W 500 mL intermittent infusion      1,500 mg  376.7 mL/hr over 1.5 Hours Intravenous EVERY 24 HOURS 04/16/18 0837      04/13/18 1530  piperacillin-tazobactam (ZOSYN) infusion 3.375 g      3.375 g  100 mL/hr over 30 Minutes Intravenous EVERY 6 HOURS 04/13/18 1401            Indication: SSTI    Days of Therapy: 5     Pertinent labs:  Creatinine   Creatinine   Date Value Ref Range Status   04/17/2018 1.08 0.66 - 1.25 mg/dL Final   04/16/2018 0.97 0.66 - 1.25 mg/dL Final   04/15/2018 1.00 0.66 - 1.25 mg/dL Final     WBC   WBC   Date Value Ref Range Status   04/17/2018 9.6 4.0 - 11.0 10e9/L Final   04/16/2018 9.1 4.0 - 11.0 10e9/L Final   04/15/2018 8.1 4.0 - 11.0 10e9/L Final     Procalcitonin No results found for: PCAL  CRP   CRP Inflammation   Date Value Ref Range Status   04/16/2018 78.3 (H) 0.0 - 8.0 mg/L Final   04/13/2018 246.0 (H) 0.0 - 8.0 mg/L Final   12/16/2017 20.4 (H) 0.0 - 8.0 mg/L Final       Culture Results:   Blood culture [[id number]] Collected: 04/16/18 0654      Order Status: Completed Lab Status: Preliminary result Updated: 04/17/18 0610     Specimen: Blood       Specimen Description Blood      Culture Micro No growth after 23 hours     Blood culture [[id number]] Collected: 04/16/18 0641     Order Status: Completed Lab Status: Preliminary result Updated: 04/17/18 0610     Specimen: Blood       Specimen Description Blood      Culture Micro No growth after 23 hours     Anaerobic bacterial culture [[id number]] Collected: 04/14/18 1025     Order Status: Completed Lab Status: Preliminary result Updated: 04/15/18 1112     Specimen: Tissue from Buttock       Specimen Description Left Buttock      Culture Micro Culture in progress     Gram stain [[id number]] (Abnormal) Collected: 04/14/18  1025     Order Status: Completed Lab Status: Edited Result - FINAL Updated: 04/15/18 0936     Specimen: Tissue from Buttock       Specimen Description Left Buttock      Gram Stain Many   PMNs seen          Moderate   Gram positive cocci    (A)     Tissue Culture Aerobic Bacterial [[id number]] (Abnormal)  Collected: 04/14/18 1025     Order Status: Completed Lab Status: Preliminary result Updated: 04/17/18 0710     Specimen: Tissue from Buttock       Specimen Description Left Buttock      Culture Micro Moderate growth   Morganella morganii ssp. morganii    (A)       Light growth   Escherichia coli    (A)       Moderate growth   Enterococcus species   Susceptibility testing in progress    (A)       Moderate growth   Pseudomonas aeruginosa    (A)     Culture & Susceptibility      ESCHERICHIA COLI      Antibiotic Interpretation Sensitivity Unit Method Status     AMPICILLIN Sensitive 8 ug/mL ELPIDIO Preliminary     AMPICILLIN/SULBACTAM Sensitive 4 ug/mL ELPIDIO Preliminary     CEFEPIME Sensitive <=1 ug/mL ELPIDIO Preliminary     CEFTAZIDIME Sensitive <=1 ug/mL ELPIDIO Preliminary     CEFTRIAXONE Sensitive <=1 ug/mL ELPIDIO Preliminary     CIPROFLOXACIN Sensitive 1 ug/mL ELPIDIO Preliminary     GENTAMICIN Sensitive <=1 ug/mL ELPIDIO Preliminary     IMIPENEM Sensitive <=0.25 ug/mL ELPIDIO Preliminary     LEVOFLOXACIN Sensitive 1 ug/mL ELPIDIO Preliminary     Piperacillin/Tazo Sensitive <=4 ug/mL ELPIDIO Preliminary     TOBRAMYCIN Sensitive <=1 ug/mL ELPIDIO Preliminary     Trimethoprim/Sulfa Sensitive <=1/19 ug/mL ELPIDIO Preliminary                 MORGANELLA MORGANII SSP. MORGANII      Antibiotic Interpretation Sensitivity Unit Method Status     AMPICILLIN Resistant >=32 ug/mL ELPIDIO Preliminary     AMPICILLIN/SULBACTAM Resistant >=32 ug/mL ELPIDIO Preliminary     CEFEPIME Sensitive <=1 ug/mL ELPIDIO Preliminary     CEFTAZIDIME Intermediate 8 ug/mL ELPIDIO Preliminary     CEFTRIAXONE Sensitive <=1 ug/mL ELPIDIO Preliminary     CIPROFLOXACIN Sensitive 0.5 ug/mL ELPIDIO Preliminary     GENTAMICIN  Sensitive <=1 ug/mL ELPIDIO Preliminary     IMIPENEM Resistant 4 ug/mL ELPIDIO Preliminary     LEVOFLOXACIN Sensitive 2 ug/mL ELPIDIO Preliminary     Piperacillin/Tazo Intermediate 64 ug/mL ELPIDIO Preliminary     TOBRAMYCIN Sensitive <=1 ug/mL ELPIDIO Preliminary     Trimethoprim/Sulfa Sensitive <=1/19 ug/mL ELPIDIO Preliminary                 PSEUDOMONAS AERUGINOSA      Antibiotic Interpretation Sensitivity Unit Method Status     CEFEPIME Sensitive 8 ug/mL ELPIDIO Preliminary     CEFTAZIDIME Sensitive 2 ug/mL ELPIDIO Preliminary     CIPROFLOXACIN Intermediate 2 ug/mL ELPIDIO Preliminary     GENTAMICIN Sensitive 4 ug/mL ELPIDIO Preliminary     IMIPENEM Sensitive 2 ug/mL ELPIDIO Preliminary     LEVOFLOXACIN Resistant >=8 ug/mL ELPIDIO Preliminary     Piperacillin/Tazo Sensitive 8 ug/mL ELPIDIO Preliminary     TOBRAMYCIN Sensitive <=1 ug/mL ELPIDIO Preliminary                           Blood culture [[id number]] Collected: 04/13/18 0914     Order Status: Completed Lab Status: Preliminary result Updated: 04/17/18 0610     Specimen: Blood       Specimen Description Blood      Culture Micro No growth after 4 days     Wound Culture Aerobic Bacterial [[id number]] (Abnormal)  Collected: 04/13/18 0840     Order Status: Completed Lab Status: Final result Updated: 04/16/18 1101     Specimen: Left Buttock       Specimen Description Left Buttock Ulcer      Culture Micro Heavy growth   Morganella morganii ssp. morganii    (A)       Light growth   Escherichia coli    (A)       Light growth   Staphylococcus aureus    (A)       Moderate growth   Pseudomonas aeruginosa    (A)       Plus normal skin jenny.     Culture & Susceptibility      ESCHERICHIA COLI      Antibiotic Interpretation Sensitivity Unit Method Status     AMPICILLIN Sensitive 8 ug/mL ELPIDIO Final     AMPICILLIN/SULBACTAM Sensitive 4 ug/mL ELPIDIO Final     CEFEPIME Sensitive <=1 ug/mL ELPIDIO Final     CEFTAZIDIME Sensitive <=1 ug/mL ELPIDIO Final     CEFTRIAXONE Sensitive <=1 ug/mL ELPIDIO Final     CIPROFLOXACIN Sensitive 0.5 ug/mL ELPIDIO Final      GENTAMICIN Sensitive <=1 ug/mL ELPIDIO Final     IMIPENEM Sensitive <=0.25 ug/mL ELPIDIO Final     LEVOFLOXACIN Sensitive 1 ug/mL ELPIDIO Final     Piperacillin/Tazo Sensitive <=4 ug/mL ELPIDIO Final     TOBRAMYCIN Sensitive <=1 ug/mL ELPIDIO Final     Trimethoprim/Sulfa Sensitive <=1/19 ug/mL ELPIDIO Final                 MORGANELLA MORGANII SSP. MORGANII      Antibiotic Interpretation Sensitivity Unit Method Status     AMPICILLIN Resistant >=32 ug/mL ELPIDIO Final     AMPICILLIN/SULBACTAM Resistant >=32 ug/mL ELPIDIO Final     CEFEPIME Sensitive 4 ug/mL ELPIDIO Final     CEFTAZIDIME Intermediate 8 ug/mL ELPIDIO Final     CEFTRIAXONE Sensitive <=1 ug/mL ELPIDIO Final     CIPROFLOXACIN Sensitive 0.5 ug/mL ELPIDIO Final     GENTAMICIN Sensitive <=1 ug/mL ELPIDIO Final     IMIPENEM Resistant 4 ug/mL ELPIDIO Final     LEVOFLOXACIN Sensitive 2 ug/mL ELPIDIO Final     Piperacillin/Tazo Sensitive <=4 ug/mL ELPIDIO Final     TOBRAMYCIN Sensitive <=1 ug/mL ELPIDIO Final     Trimethoprim/Sulfa Sensitive <=1/19 ug/mL ELPIDIO Final                 PSEUDOMONAS AERUGINOSA      Antibiotic Interpretation Sensitivity Unit Method Status     CEFEPIME Sensitive 8 ug/mL ELPIDIO Final     CEFTAZIDIME Sensitive 4 ug/mL ELPIDIO Final     CIPROFLOXACIN Intermediate 2 ug/mL ELPIDIO Final     GENTAMICIN Intermediate 8 ug/mL ELPIDIO Final     IMIPENEM Sensitive 1 ug/mL ELPIDIO Final     LEVOFLOXACIN Resistant >=8 ug/mL ELPIDIO Final     Piperacillin/Tazo Sensitive 8 ug/mL ELPIDIO Final     TOBRAMYCIN Sensitive <=1 ug/mL ELPIDIO Final                 STAPHYLOCOCCUS AUREUS      Antibiotic Interpretation Sensitivity Unit Method Status     CLINDAMYCIN Resistant >=8 ug/mL ELPIDIO Final     ERYTHROMYCIN Resistant >=8 ug/mL ELPIDIO Final     GENTAMICIN Sensitive <=0.5 ug/mL ELPIDIO Final     OXACILLIN Sensitive 0.5 ug/mL ELPIDIO Final     PENICILLIN Resistant 0.12 ug/mL ELPIDIO Final     TETRACYCLINE Resistant >=16 ug/mL ELPIDIO Final     Trimethoprim/Sulfa Sensitive <=0.5/9.5 ug/mL ELPIDIO Final     VANCOMYCIN Sensitive 1 ug/mL ELPIDIO Final                           Gram stain  [[id number]] (Abnormal) Collected: 04/13/18 0840     Order Status: Completed Lab Status: Final result Updated: 04/13/18 1404     Specimen: Left Buttock       Specimen Description Left Buttock Ulcer      Gram Stain No PMNs seen       Many   Gram positive cocci    (A)     Blood culture [[id number]] (Abnormal) Collected: 04/13/18 0840     Order Status: Completed Lab Status: Preliminary result Updated: 04/16/18 0658     Specimen: Blood       Specimen Description Blood Left Arm      Culture Micro 2 of 2 bottles   Coagulase negative Staphylococcus   Susceptibility testing not routinely done    (A)          Recommendations/Interventions:  1. No recommendations at this time.     Jace Holloway [facility]  April 17, 2018

## 2018-04-17 NOTE — PHARMACY-ANTICOAGULATION SERVICE
Patient's INR is 1.77 this morning. Will give 2.5 mg dose of warfarin today.    Morelia Sandoval.

## 2018-04-17 NOTE — PLAN OF CARE
"Problem: Skin and Soft Tissue Infection (Adult)  Goal: Signs and Symptoms of Listed Potential Problems Will be Absent, Minimized or Managed (Skin and Soft Tissue Infection)  Signs and symptoms of listed potential problems will be absent, minimized or managed by discharge/transition of care (reference Skin and Soft Tissue Infection (Adult) CPG).   Outcome: Improving   04/17/18 0517   Skin and Soft Tissue Infection   Problems Assessed (Skin and Soft Tissue Infection) all   Problems Present (Skin/Soft Tissue Inf) pain;situational response       /71 (BP Location: Right arm)  Pulse 75  Temp 99.4  F (37.4  C) (Tympanic)  Resp 22  Ht 1.803 m (5' 11\")  Wt 80.7 kg (177 lb 14.6 oz)  SpO2 95%  BMI 24.81 kg/m2    Pt lethargic, oriented to self and place only. C/O generalized pain, unable to rate. 1 Norco tab given. Lungs clear. BS active, no BM this shift. HR regular. Dressings to coccyx and BLE C/D/I. T/R q2h. Barrier creme applied to buttocks. Mostly continent of urine tonight. Zosyn infused w/o issues.  Verbally sexually inappropriate. Redirected. No falls or injuries this shift. Will continue to monitor.     Iv MD earl aware. OK to leave out. Possible PICC placement today.     Face to face report given with opportunity to observe patient.    Report given to ROSS Reece and ROSS Ruiz   4/17/2018  7:11 AM      "

## 2018-04-17 NOTE — CONSULTS
Evaluated Seun's legs per request of Ai Nunez CNP. Seun agreed with allowing me to look at his legs but refused and removal of crusting to the LE's. Legs cleansed which did remove a small amt of the crusting, let air dry.   Small open area to anterior LLE draining small amt of serosanguinous fluid. Substantial crusting to the legs, some of which appears as if it would lift easily.  Overall this is probably the best I have seen his legs look despite the crusting. No erythema or excessive warmth.   I would recommend debridement of the crusts however pt will not allow this. At this time I would recommend keeping the legs clean and dry and use dry gauze to any open areas. I would also recommend that the legs be loosely wrapped with kerlix so that he is not shedding crusts in the NH facility.  Daily dressing change as follows:  Cleansed legs with mild soap, air dry. Cover any draining areas with 4x4 dry gauze. Apply kerlix to LE's, ensure it is not too tight.  Spoke with Dr. Bennett about this plan and he is in agreement.

## 2018-04-18 NOTE — PHARMACY-ANTICOAGULATION SERVICE
Clinical Pharmacy- Warfarin Discharge Note  This patient is currently on warfarin for the treatment of DVT/PE prophylaxis.  INR Goal= 2-3    Anticoagulation Dose History     Recent Dosing and Labs Latest Ref Rng & Units 4/13/2018 4/13/2018 4/14/2018 4/15/2018 4/16/2018 4/17/2018 4/18/2018    Warfarin 2.5 mg - - - - - - 2.5 mg -    Warfarin 5 mg - - - - 5 mg 5 mg - -    INR 0.80 - 1.20 2.49(H) 1.81(H) 1.15 1.09 1.13 1.77(H) 2.22(H)        Vitamin K doses administered during the last 7 days: 2 mg IV Vit K given prior to procedure  FFP administered during the last 7 days: 0    Spoke with Dr. Peter's nurse from the [facility] and relayed the patient's anticoagulation information including the patient's INRs, subsequent doses given, antibiotics/interacting meds, and discharge information including the patient's updated warfarin dosing of 2 mg daily. She had no further questions/concerns at this time.

## 2018-04-18 NOTE — PLAN OF CARE
Problem: Patient Care Overview  Goal: Plan of Care/Patient Progress Review  Outcome: No Change  Reason for hospital stay:  Infected pressure ulcer    Pain Management:  Patient complains of buttock and pressure sore site pain. Gave norco x1 this shift with effective pain relief.     Orientation:  Oriented to self    GI:  BS x4 - good appetite, ate all of supper. Wife assisted to eat. Had 1 loose stool this shift.     :  Incontinent x2     Skin Issues:  Dressing changed to buttock wound. Cleansed with NS. Noted dark brown / black areas inside wound and had small amounts serosanguinous drainage and foul odor on old dressing.  New packed wet to dry dressing applied. Barrier cream applied to excoriated areas on sacrum, and bilateral buttocks.  Repositioned every 2 hours. Up in chair for supper.    Mobility:  Jer Lift    Safety:  Alarms on - makes needs known      Face to face report given with opportunity to observe patient.    Report given to Vilma Snow   4/18/2018  1:03 AM           04/18/18 0102   OTHER   Plan Of Care Reviewed With patient;spouse   Plan of Care Review   Progress no change       Problem: Skin and Soft Tissue Infection (Adult)  Goal: Signs and Symptoms of Listed Potential Problems Will be Absent, Minimized or Managed (Skin and Soft Tissue Infection)  Signs and symptoms of listed potential problems will be absent, minimized or managed by discharge/transition of care (reference Skin and Soft Tissue Infection (Adult) CPG).   Outcome: No Change   04/17/18 1650   Skin and Soft Tissue Infection   Problems Assessed (Skin and Soft Tissue Infection) all   Problems Present (Skin/Soft Tissue Inf) infection progression;pain;situational response

## 2018-04-18 NOTE — PROGRESS NOTES
[facility]  Hospice/Palliative Care Consultation      Reason for Consultation:  Seun Emanuel, 72 year old, male is seen for consultation regarding end of life goals and ongoing management of terminal illness    Advanced Directives:  DNR/DNI    Attestation:   Total time spent on floor on evaluation, counseling, and coordination of care:  45 minutes.     History of Present Illness:  Seun Emanuel is a 72 year old male who was admitted from home with worsening left buttock decubitus ulcer and associated fever and confusion.  He has been on  IV Zosyn and Vancomycin and underwent surgical consultation with debridement. There is bone and connective tissue involved.  Cultures taken in the Emergency Room as well as intraoperatively showed multiple organisms including Morganella, E coli, pseudomonas, as well as enterococcus.  Because of this multispecies infection,  limited IV access, and difficult oral treatment because of lack of organism sensitivity, it was felt that the option of no treatment should be considered.  This was discussed by the hospitalist with family. He has received a 4 day course. It was elected that he be admitted to hospice with end of life cares.  Upon my interview he states he just wants to to go home.  Seun had an episode of agitation and delirium which responded to haloperidol.      He has a history of right carotid cerebrovascular accident as with resulting left hemiparesis.  In addition, Seun has mild dementia.  He is nonambulatory and is cared for at home.  Seun requires a Jer lift for transfers.    Medical records are reviewed.        Current Facility-Administered Medications:      ipratropium - albuterol 0.5 mg/2.5 mg/3 mL (DUONEB) neb solution 3 mL, 3 mL, Inhalation, Q4H PRN, Ai Nunez NP     Warfarin Therapy Reminder (Check START DATE - warfarin may be starting in the FUTURE), 1 each, Does not apply, Continuous PRN, Ai Nunez NP     HYDROmorphone  (PF) (DILAUDID) injection 0.2 mg, 0.2 mg, Intravenous, Q1H PRN, Ai Nunez, NP, 0.2 mg at 04/16/18 2010     albuterol neb solution 2.5 mg, 1 vial, Nebulization, Q4H PRN, Arpit Ponce MD     amLODIPine (NORVASC) tablet 5 mg, 5 mg, Oral, Daily, Arpit Ponce MD, 5 mg at 04/17/18 0852     baclofen (LIORESAL) tablet 10 mg, 10 mg, Oral, TID, Arpit Ponce MD, 10 mg at 04/17/18 2008     gabapentin (NEURONTIN) capsule 100 mg, 100 mg, Oral, TID, Arpit Ponce MD, 100 mg at 04/17/18 2008     haloperidol (HALDOL) tablet 5 mg, 5 mg, Oral, Q8H PRN, Arpit Ponce MD, 5 mg at 04/18/18 0212     HYDROcodone-acetaminophen (NORCO)  MG per tablet 1 tablet, 1 tablet, Oral, Q6H PRN, Arpit Ponce MD, 1 tablet at 04/17/18 2025     levothyroxine (SYNTHROID/LEVOTHROID) tablet 125 mcg, 125 mcg, Oral, Daily, Arpit Ponce MD, 125 mcg at 04/17/18 0630     losartan (COZAAR) tablet 100 mg, 100 mg, Oral, Daily, Arpit Ponce MD, 100 mg at 04/17/18 0852     metoprolol succinate (TOPROL-XL) 24 hr tablet 100 mg, 100 mg, Oral, Daily, Arpit Ponce MD, 100 mg at 04/17/18 0852     nicotine (NICODERM CQ) 21 MG/24HR 24 hr patch 1 patch, 1 patch, Transdermal, Daily, Arpit Ponce MD, 1 patch at 04/17/18 1412     naloxone (NARCAN) injection 0.1-0.4 mg, 0.1-0.4 mg, Intravenous, Q2 Min PRN, Arpit Ponce MD     melatonin tablet 1 mg, 1 mg, Oral, At Bedtime PRN, Arpit Ponce MD     lidocaine 1 % 1 mL, 1 mL, Other, Q1H PRN, Arpit Ponce MD     lidocaine (LMX4) kit, , Topical, Q1H PRN, Arpit Ponce MD     sodium chloride (PF) 0.9% PF flush 3 mL, 3 mL, Intracatheter, Q1H PRN, Arpit Ponce MD     sodium chloride (PF) 0.9% PF flush 3 mL, 3 mL, Intracatheter, Q8H, Arpit Ponce MD, 3 mL at 04/15/18 1346     Patient is already receiving anticoagulation with heparin, enoxaparin (LOVENOX), warfarin (COUMADIN)  or other anticoagulant medication, , Does not apply, Continuous PRN,  Arpit Ponce MD     ondansetron (ZOFRAN-ODT) ODT tab 4 mg, 4 mg, Oral, Q6H PRN, 4 mg at 04/15/18 1127 **OR** ondansetron (ZOFRAN) injection 4 mg, 4 mg, Intravenous, Q6H PRN, Arpit Ponce MD     nicotine patch REMOVAL, , Transdermal, Daily, Arpit Ponce MD, Stopped at 04/14/18 1553     nicotine Patch in Place, , Transdermal, Q8H, Arpit Ponce MD     pantoprazole (PROTONIX) EC tablet 40 mg, 40 mg, Oral, QAM AC, Arpit Ponce MD, 40 mg at 04/17/18 0630     fluticasone-vilanterol (BREO ELLIPTA) 200-25 MCG/INH oral inhaler 1 puff, 1 puff, Inhalation, Daily, Arpit Ponce MD, 1 puff at 04/17/18 0919      Allergies   Allergen Reactions     Bactroban [Mupirocin]      Furosemide Unknown     Takes torsemide at home  Unknown     Indapamide      Lasix      Takes torsemide at home  Unknown       Sulfa Drugs      Vomiting, can't breathe       Sulfasalazine Nausea and Vomiting     Vomiting, can't breathe       Past Medical History:  Past Medical History:   Diagnosis Date     Asthma      Bilateral lower leg cellulitis      Chronic venous insufficiency      CKD (chronic kidney disease), stage III      COPD (chronic obstructive pulmonary disease) (H)      CVA (cerebral vascular accident) (H)      DVT (deep venous thrombosis) (H)      Gout      Hypertension      Hypothyroidism      MRSA (methicillin resistant Staphylococcus aureus) infection      Osteoarthritis      Peripheral vascular disease (H)      Ulcers of both lower legs (H)          Past Surgical History:   Procedure Laterality Date     AMPUTATION Left 03/07/2012    left great toe     CHOLECYSTECTOMY  1994     EXCISE LESION BUTTOCK(S) Left 4/14/2018    Procedure: EXCISE LESION BUTTOCK(S);  EXCISIONAL DEBRIDEMENT LEFT BUTTOCKS,  WOUND: 11 CM X 6 CM X 2CM;  Surgeon: Sampson Mitchell MD;  Location: [facility]     HIP SURGERY Left 01/28/2015     IRRIGATION AND DEBRIDEMENT LOWER EXTREMITY, COMBINED  2010    Both legs     TONSILLECTOMY           Family  History:  Family History   Problem Relation Age of Onset     HEART DISEASE Mother      CAD     CANCER Mother      HEART DISEASE Father      CHF     Genitourinary Problems Father      Renal failure     Hypertension Father        Social History:   reports that he has been smoking.  He has a 20.00 pack-year smoking history. He has never used smokeless tobacco. He reports that he does not drink alcohol or use illicit drugs.      Review of Systems:    The remainder of a 10 point review of systems is negative.    Physical Examination:  GENERAL:  Chronically ill appearing  HEENT:  Oral cavity moist without lesion  NECK:  No palpable lymphadenopathy.  No JVD  CV:  Regrate and rhythm.  No detectable murmur appreciated.  No clicks or rubs.  LUNGS:  Diminished breath sounds anteriorly to auscultation. No rales or wheezes  ABDOMEN:  Soft and nontender.  No palpable masses.  Bowel sounds present.  EXTREM:  There is obvious stasis changes with edema. Dressings in place.  Hyperkeratosis noted.  Buttock wound not examined.  NEURO:  Alert, although lethargic.  Responds appropriately.  Obvious left hemiparesis.  PSYCH:  As above     Laboratory/Imaging:  All laboratory results and imaging are reviewed.    Assessment/Plan:  (L98.493,  L08.9) Infected skin ulcer with necrosis of muscle (H)  Comment: Complex management issues.  His pain is controlled.  Local wound care in place.  Plan: Admit to Hospice.  End of life goals will be honored.  Will follow.          Theo Tejada MD

## 2018-04-18 NOTE — DISCHARGE INSTRUCTIONS
Daily dressing change as follows to BLE's:  Cleansed legs with mild soap, air dry. Cover any draining areas with 4x4 dry gauze. Apply kerlix to LE's, ensure it is not too tight.    Dressing changes for open wound to L-buttocks- twice daily, if soiled with stool it needs to be changed sooner.  Use saline moistened kerlex roll (approx 1/2-2/3 roll) with dry gauze and  ABD pad over the top.    Hospice will be to your home between 12:30 and 1:00 pm today.

## 2018-04-18 NOTE — PLAN OF CARE
Patient discharged at 12:15 PM via wheel chair accompanied by spouse and staff. Prescriptions filled and sent with patient upon discharge. All belongings sent with patient.     Discharge instructions reviewed with Dania (wife) . Listed belongings gathered and returned to patient. Yes    Patient discharged to Home.   Report called to Home Care:  [facility] - [facility].      Core Measures and Vaccines  Core Measures applicable during stay: No. If yes, state diagnosis: n/a  Pneumonia and Influenza given prior to discharge, if indicated: N/A    Surgical Patient   Surgical Procedures during stay: Wound debridement  Did patient receive discharge instruction on wound care and recognition of infection symptoms? Yes    MISC  Follow up appointment made:  No  Home and hospital aquired medications returned to patient: Yes  Patient reports pain was well managed at discharge: Yes

## 2018-04-18 NOTE — PROGRESS NOTES
Name: Seun Emanuel    MRN#: [id number]    Reason for Hospitalization: Infected skin ulcer with necrosis of muscle (H) [L98.493, L08.9]    Discharge Date: 4/18/2018    Patient / Family response to discharge plan: in agreement    Follow-Up Appt: No future appointments.    Other Providers (Care Coordinator, County Services, PCA services etc): Yes: [facility]    Discharge Disposition: home with [facility]    Kirstin Bean

## 2018-04-18 NOTE — PLAN OF CARE
"/90 (BP Location: Right arm)  Pulse 66  Temp 98.9  F (37.2  C) (Tympanic)  Resp 18  Ht 1.803 m (5' 11\")  Wt 80 kg (176 lb 5.9 oz)  SpO2 92%  BMI 24.6 kg/m2    Pt confused and disoriented x3. Hallucinating and agitated. Haldol given for agitation and pain, pt relaxed afterwards. Lungs clear. BS active, smear loose BM this shift. INC of urine. Dressings C/D/I to coccyx and BLE. Legs elevated. T/R q2h. No falls or injuries this shift. Will continue to monitor.     Face to face report given with opportunity to observe patient.    Report given to Sara Davis   4/18/2018  7:13 AM      "

## 2018-04-18 NOTE — PLAN OF CARE
BP via spacelab 212/90 BUE. Via manual cuff 180/90. MD aware no new orders. 0441    /75 manually this AM. MD aware. No new orders. 0644

## 2018-04-18 NOTE — PLAN OF CARE
Problem: Patient Care Overview  Goal: Plan of Care/Patient Progress Review  Outcome: Adequate for Discharge Date Met: 04/18/18  /71, Temp 99.8F. Given Norco for pain relief 1x this shift. Lungs dim but clear throughout. Skin moist/clammy d/t sweating. Pt confused this morning and slightly agitated but redirectable, lethargic after dressing change. Dressing changed d/t soiling previous one. Wound bed covered with creamy white/yellow film, edges still erythematous with yellow areas, odorous. Wet to dry Kerlix packed, topped with dry 4x4 gauze and ABD pad, secured with medipore tape. Whole of buttocks still excoriated and red but blanchable. Legs dressed with 4x4 gauze over moist areas then dry Kerlix, secured with tape. RLE larger than LLE. BS active, passing flatus. Incontinent of urine and stool. Turn and repo q2. Jer lift to ambulate into powered w/c. Anticipating D/C this shift.

## 2018-04-19 NOTE — PROGRESS NOTES
PHYSICIAN CERTIFICATION OF TERMINAL ILLNESS FOR HOSPICE BENEFIT    April 19, 2018    Seun Emanuel    1945      Effective Date of Certification    Start Date:  04-18-18      End Date:  07-16-18    Terminal Diagnosis:    Late Effects of CVA      Secondary Diagnoses:     Left hemiparesis     Pressure ulcer, buttock      Prognosis Related Comorbidities:    Delirium    Dementia    Chronic kidney disease     COPD      Narrative Statement:  Client suffers from cerebrovascular disease with resultin left hemiparesis and large pressure ulcer of left buttock.  He is not undergoing disease directed therapy.  He continues to decline. Client resides at home and requires assistance with ADL's.  Goals of symptom control will be met.  Because of the progressive nature of the illness and associated comorbidities, client qualifies for hospice care.             I certify that this patient is terminally ill and has a life expectancy of 6 months or less if the terminal illness runs its anticipated course.        Attestation:  I confirm that this narrative was composed by myself and is based on review of the medical record and/or examination of the patient.            Theo Tejada MD

## 2018-04-19 NOTE — DISCHARGE SUMMARY
[facility]    Discharge Summary  Hospitalist    Date of Admission:  4/13/2018  Date of Discharge:  4/18/2018 12:15 PM  Discharging Provider: Ever Olivas  Date of Service (when I saw the patient): 4/18/18    Discharge Diagnoses   End-of-life planning  Decubitus ulcer with muscle necrosis  Bacteremia likely representing contaminated specimen  Induced coagulopathy, resolved  Warfarin anticoagulation for prior DVT resumed by the time of discharge  Acute kidney injury, resolved  Chronic kidney disease approximately stage II  Chronic venous stasis with venous stasis ulcers  Established CVA with residual including minimal ambulation  Primary hypertension  Volume depletion, resolved    History of Present Illness   Mr. Emanuel is a 72-year-old man with a previous history of a CVA, is basically almost nonambulatory, and multiple chronic venous stasis changes of his lower extremities with recurrent bouts of cellulitis.  He is basically nonambulatory at home.    He had a recent admission here at the end of February with an episode of cellulitis of his lower extremity.  He returned home where he has essentially been nonambulatory.  They use a Jer lift, and over the last couple of days, apparently he has had increasing fevers up to 103 last night, with as a decubitus ulcer on his left buttock that he has been digging into, complicated by bleeding and obvious infection, and his wife brought him to the ER for evaluation of this as he was getting a little confused also. No other bleeding other than from this decubitus ulcer.  His lower extremities, which have a significant of hyperkeratotic skin, actually have been in very good shape compared to how he was at the end of February, but because of this worsening ulcer, which appeared the source of his current infection at the time of presentation .      Hospital Course   Seun Emanuel was admitted on 4/13/2018.  The following problems were addressed during his  hospitalization:     Infected left decubitus ulcer:    The patient is basically bed bound in the past, has not had any major prior ulcerations, but this is certainly severity of this is high.  Ulcer is complicated by the patient's finger trauma, and has connective tissue/bone exposed. He has had no obvious signs of systemic infection/sepsis sepsis, and remained hemodynamically stable.  No leukocytosis or fevers, Lactic acid is normal.  Zosyn and Vancomycin started on arrival prior to culture of wound.    He underwent incisional debridement by Dr. Mitchell.  Cultures both of the wound itself as well as cultures obtained at surgical debridement has shown multiple organisms including a Morganella species, E. coli, pseudomonas aeruginosa, and enterococcus, unfortunately few attractive options.  Intravenous access extremely difficult and no single or multiple oral agents attractive.  He has had what appears to be a significant adverse reaction to trimethoprim/sulfa with significant GI complications as well as difficulty breathing.    In any event is undergone surgical debridement and with the residual clean wound base it is not certain any antibiotic treatment is in fact indicated.  If absolutely required could consider intramuscular aminoglycosides although this would require drug monitoring and the treatment itself is somewhat burdensome.  Quinolones not attractive is they show only intermediate sensitivity to at least 1 of the organisms.  Have discussed with the patient and wife at some length this morning including options of no antibiotic and consequences of antibiotic treatment itself.  We have agreed to proceed to not continue antibiotics after the 4 day course she is already received, continuing local wound care, and as below returning home for palliative care with the assistance of the hospice team.     Bacteremia:   2/4 initial blood cultures bottles positive for coagulase-negative Staphylococcus-most likely  contaminant.  Several subsequent blood cultures have been nonreactive.  His WBC is normal and is not toxic, no signs of sepsis.  Would not be inclined to continue treatment directed at this bacteremia     Induced coagulopathy:    Now resolved and warfarin resumed at a lower dose than previously.  Not certain that a therapeutic level of warfarin is in fact indicated at the present time.  If continuing this treatment both in terms of the medications and requiring INR testing becomes burdensome would be inclined to discontinue.  He has been treated with warfarin for previous DVT.  Initially reversed with Vit K in preparation for I&D, with warfarin restarted 4/15 with approval of surgery, pharmacy dosing.      Acute on chronic kidney injury:    BUN and creatinine are above what his baseline of 0.9 to 1.10 on arrival.    Certainly initially met most sensitive criteria for acute kidney injury, .  Now with creatinine suggesting a return to his prior renal function. Initially did appear to be volume depleted.  Suspect that with multiple issues chronic kidney disease has progressed somewhat.  In any event no reason to continue in the treatment beyond supportive care particularly encouraging fluid intake.        Chronic venous stasis changes of his legs:    Compared to prior presentations this appears quite well compensated. In the past he has had numerous rounds of cellulitis.  There is no obvious cellulitis at this time; has minimal edema compared to previously. Chronic lichenization.  Evaluated by wound care today who suggested only Kerlex to minimize any scaling of skin with absorbent pads if he has any drainage.     History of cerebrovascular accident:   Remains at baseline     Primary hypertension:    Continuing losartan, lisinopril, beta blocker and Norvasc.  Will continue with exception of discontinuing lisinopril as he is already on ARB. Blood pressures have been stable.      End-of-life planning and advanced  "directives  I have discussed with the patient and his wife limited options for ongoing treatment and issues of treatment that becomes excessively burdensome.  Vicki Nunez was also able to discuss these issues with them  previously.  Overall while he and wife remain optimistic that wounds may heal both acknowledge that this is in fact unlikely and that increasingly treatment directed towards comfort seems reasonable.  unfortunately options for skilled nursing are extremely limited in both feel that a plan of him returning home with as much support as possible would be best for him.  On discussion with options of palliative care and hospice they are agreeable.    Palliative care/hospice nursing and physician team members have been able to evaluate the patient.  On return home the patient will be enrolled in hospice.    Ever Olivas    Significant Results and Procedures   Operative incisional debridement, Dr. Mitchell      Code Status   DNR / DNI/hospice       Primary Care Physician   Herbie Peter    Vital signs:                   Height: 180.3 cm (5' 11\") Weight: 81.3 kg (179 lb 3.7 oz)  Estimated body mass index is 25 kg/(m^2) as calculated from the following:    Height as of this encounter: 1.803 m (5' 11\").    Weight as of this encounter: 81.3 kg (179 lb 3.7 oz).     Awake alert and interactive.  Anxious to return home  Pupils equal, round, and conjugate.  Oral mucosa moist  Lung exam shows aeration minimally diminished at bases.  Few scattered rhonchi.  Heart tones regular.  PMI not displaced.  Abdomen soft.  No tenderness to palpation or percussion.  Extremities warm.  Lichenified skin distally.  Kerlix gauze dressing with minimal drainage.    # Discharge Pain Plan:   Continuing chronic prescription including fentanyl patch and gabapentin.  Anticipate acceleration of analgesia as required, directed by hospice team.    Discharge Disposition   Discharged to home with hospice team " following  Condition at discharge: Stable    Consultations This Hospital Stay   PHARMACY TO DOSE VANCO  PHARMACY TO DOSE WARFARIN  WOUND OSTOMY CONTINENCE NURSE  IP CONSULT  PHYSICAL THERAPY ADULT IP CONSULT  OCCUPATIONAL THERAPY ADULT IP CONSULT  PALLIATIVE CARE ADULT IP CONSULT    Time Spent on this Encounter   I, Ever Olivas, personally saw the patient today and spent greater than 30 minutes discharging this patient.    Discharge Orders     INR   May discontinue as indicated by hospice team     Home care nursing referral     Reason for your hospital stay   Mr. Emanuel is a 72-year-old man with a previous history of a CVA; almost nonambulatory. He has had a lot of LE chronic venous stasis changes and recurrent bouts of cellulitis.  Wife brought him to emergency department because of worsening left gluteal decubitus ulcer, fevers, and confusion.  He underwent surgical incisional debridement of his ulcer.  Deep cultures showed multiple organisms.  Space he was treated with interest in about treatment difficult particularly with limited IV access.  On discussion with him and his wife agreed to return home redirecting goals of therapy to comfort in the hopes that wound may improve with local measures but acknowledged that unfortunately he will not survive long.  He has been evaluated by hospice team prior to discharge.     Follow-up and recommended labs and tests    Follow up with primary care provider, Herbie Peter, or hospice physician Dr Tejada as needed.     Activity   Your activity upon discharge: activity as tolerated     MD face to face encounter   Documentation of Face to Face and Certification for Home Health Services    I certify that patient: Seun Emanuel is under my care and that I, or a nurse practitioner or physician's assistant working with me, had a face-to-face encounter that meets the physician face-to-face encounter requirements with this patient on: 4/18/2018.    This encounter  with the patient was in whole, or in part, for the following medical condition, which is the primary reason for home health care: decubitus ulcer with muscle necrosis, end of life care.    I certify that, based on my findings, the following services are medically necessary home health services: Nursing.    My clinical findings supporLeaving home is medically contraindicated for the following reason(s): Dyspnea on exertion that makes it so they cannot leave their home for needed services without clinical deterioration..t the need for the above services because: Nurse is needed: To assess hospice teaching and care after changes in medications or other medical regimen. .    Further, I certify that my clinical findings support that this patient is homebound (i.e. absences from home require considerable and taxing effort and are for medical reasons or [employer] services or infrequently or of short duration when for other reasons) because: .    Based on the above findings. I certify that this patient is confined to the home and needs intermittent skilled nursing care, physical therapy and/or speech therapy.  The patient is under my care, and I have initiated the establishment of the plan of care.  This patient will be followed by a physician who will periodically review the plan of care.  Physician/Provider to provide follow up care: Herbie Peter    Attending hospital physician (the Medicare certified [place] provider): Ever Olivas  Physician Signature: See electronic signature associated with these discharge orders.  Date: 4/18/2018     DNR/DNI     Diet   Follow this diet upon discharge: Orders Placed This Encounter     Regular Diet Adult       Discharge Medications   Discharge Medication List as of 4/18/2018 11:15 AM      CONTINUE these medications which have CHANGED    Details   amLODIPine (NORVASC) 10 MG tablet Take 1 tablet (10 mg) by mouth daily, Disp-30 tablet, R-1, E-Prescribe      warfarin (COUMADIN)  2 MG tablet 2 mg daily, Disp-30 tablet, R-1, E-Prescribe         CONTINUE these medications which have NOT CHANGED    Details   ACETAMINOPHEN PO Take 500 mg by mouth every 6 hours as needed for pain , Historical      albuterol (2.5 MG/3ML) 0.083% nebulizer solution Take 1 vial by nebulization every 2 hours as needed for shortness of breath / dyspnea or wheezing, Historical      ALBUTEROL INHALER 17GM Inhale 2 puffs into the lungs every 4 hours as needed This product no longer available, Historical      ASPIRIN PO Take 81 mg by mouth daily, Historical      BACLOFEN PO Take 10 mg by mouth 3 times daily , Historical      COLCHICINE PO Take 0.6 mg by mouth daily as needed, Historical      DIPHENHYDRAMINE HCL PO Take 25 mg by mouth One to two tabs prn itching, Historical      diphenhydrAMINE-zinc acetate (BENADRYL) cream Apply 2 g topically 3 times daily as needed for itching (for itching legs)Historical      DOCUSATE SODIUM PO Take 100 mg by mouth 2 times daily , Historical      esomeprazole (NEXIUM) 40 MG CR capsule Take 40 mg by mouth daily with food, Historical      fentaNYL (DURAGESIC) 75 mcg/hr 72 hr patch Apply 1 patch topically daily, Historical      fluticasone (FLONASE) 50 MCG/ACT nasal spray Spray 1-2 sprays into both nostrils daily, Disp-1 Package, R-0, Normal      fluticasone-salmeterol (ADVAIR DISKUS) 250-50 MCG/DOSE diskus inhaler Inhale 1 puff into the lungs 2 times daily, Historical      Gabapentin (NEURONTIN PO) Take 100 mg by mouth 3 times daily , Historical      haloperidol (HALDOL) 5 MG tablet Take 5 mg by mouth every 8 hours as needed for agitation, Historical      HYDROcodone-acetaminophen (NORCO)  MG per tablet Take 1 tablet by mouth every 6 hours as needed for moderate to severe pain, Disp-60 tablet, R-0, Local Print      HYDROXYZINE HCL PO Take 50 mg by mouth 3 times daily as needed for itching, Historical      ipratropium - albuterol 0.5 mg/2.5 mg/3 mL (DUONEB) 0.5-2.5 (3) MG/3ML neb  solution Inhale into the lungs every 6 hours as needed, Historical      Levothyroxine Sodium (SYNTHROID PO) Take 125 mcg by mouth daily , Historical      lidocaine (XYLOCAINE) 2 % jelly Apply 1 Tube topically as needed for moderate pain (apply to itching legs 3-4 times daily)Historical      LOSARTAN POTASSIUM PO Take 100 mg by mouth daily, Historical      METOPROLOL SUCCINATE ER PO Take 100 mg by mouth daily, Historical      multivitamin, therapeutic with minerals (MULTI-VITAMIN) TABS tablet Take 1 tablet by mouth daily, Historical      nicotine (NICODERM CQ) 21 MG/24HR 24 hr patch 1 patch daily, Historical      !! ORDER FOR DME Equipment being ordered: Other: *Neb machine**  Treatment Diagnosis: *COPD**Disp-1 Device, R-0, Normal      !! ORDER FOR DME Equipment being ordered: Bed CradleDisp-1 Device, R-0, Normal      !! ORDER FOR DME Equipment being ordered: Other: **Rook boots for chronic stasis dermatitis and ulcers.*  Treatment Diagnosis: *CKD3 , chronic edema with ulcers**Disp-1 Package, R-2, Normal      !! order for DME Equipment being ordered: Other: rollo cushion and arm sling  Treatment Diagnosis: stage 2 pressure sores and left should pain and OA of ac jointDisp-1 Device, R-0, ADDISON, Local Print      OXYCODONE HCL PO Take 15 mg by mouth every 8 hours as needed , Historical      potassium chloride 20 MEQ TBCR Take 1 tablet (20 mEq) by mouth daily, Disp-30 tablet, R-1, E-Prescribe      QUEtiapine (SEROQUEL) 25 MG tablet Take 25 mg by mouth every 4 hours as needed, Historical      RISPERIDONE PO Take 0.25 mg by mouth, Historical      saccharomyces boulardii (FLORASTOR) 250 MG capsule Take 250 mg by mouth 2 times daily, Historical      TORSEMIDE PO Take 20 mg by mouth daily , Historical      triamcinolone (KENALOG) 0.1 % ointment Apply topically 2 times daily as needed for irritationHistorical      UNABLE TO FIND MEDICATION NAME: Phenylephrine-DM-acetaminophen Bid PRN, Historical      ZOLPIDEM TARTRATE PO Take 10  mg by mouth nightly as needed , Historical       !! - Potential duplicate medications found. Please discuss with provider.      STOP taking these medications       lisinopril (PRINIVIL,ZESTRIL) 10 MG tablet Comments:   Reason for Stopping:             Allergies   Allergies   Allergen Reactions     Bactroban [Mupirocin]      Furosemide Unknown     Takes torsemide at home  Unknown     Indapamide      Lasix      Takes torsemide at home  Unknown       Sulfa Drugs      Vomiting, can't breathe       Sulfasalazine Nausea and Vomiting     Vomiting, can't breathe     Data   Most Recent 3 CBC's:  Recent Labs   Lab Test  04/17/18   0602  04/16/18   0642  04/15/18   0659   WBC  9.6  9.1  8.1   HGB  10.2*  11.1*  10.9*   MCV  85  85  84   PLT  259  271  241      Most Recent 3 BMP's:  Recent Labs   Lab Test  04/17/18   0602  04/16/18   0642  04/15/18   0659   NA  148*  144  144   POTASSIUM  3.9  3.2*  2.8*   CHLORIDE  114*  110*  107   CO2  26  27  28   BUN  16  12  12   CR  1.08  0.97  1.00   ANIONGAP  8  7  9   RASHEED  8.0*  7.8*  8.0*   GLC  94  104*  87     Most Recent 2 LFT's:  Recent Labs   Lab Test  04/13/18   0840  03/27/18   1144   AST  11  16   ALT  15  24   ALKPHOS  84  101   BILITOTAL  0.6  0.6     Most Recent INR's and Anticoagulation Dosing History:  Anticoagulation Dose History     Recent Dosing and Labs Latest Ref Rng & Units 4/13/2018 4/13/2018 4/14/2018 4/15/2018 4/16/2018 4/17/2018 4/18/2018    Warfarin 2.5 mg - - - - - - 2.5 mg -    Warfarin 5 mg - - - - 5 mg 5 mg - -    INR 0.80 - 1.20 2.49(H) 1.81(H) 1.15 1.09 1.13 1.77(H) 2.22(H)        Most Recent 3 Troponin's:  Recent Labs   Lab Test  12/16/17   2257  12/16/17   1410  12/16/17   0930   TROPI  0.136*  0.140*  0.141*     Most Recent Cholesterol Panel:No lab results found.  Most Recent 6 Bacteria Isolates From Any Culture (See EPIC Reports for Culture Details):  Recent Labs   Lab Test  04/16/18   0654  04/16/18   0641  04/14/18   1025  04/13/18   0914   04/13/18   0840  03/27/18   1644   CULT  No growth after 2 days  No growth after 2 days  Moderate growth  Morganella morganii ssp. morganii  *  Light growth  Escherichia coli  *  Moderate growth  Enterococcus faecalis  *  Moderate growth  Pseudomonas aeruginosa  *  Isolated in the broth only:    Enterococcus faecium  *  Heavy growth  Bacteroides fragilis  Beta lactamase positive  *  Sent to ID 4/18/18 for susceptibilities.  No growth after 5 days  2 of 2 bottles  Coagulase negative Staphylococcus  Susceptibility testing not routinely done  *  Critical Value called to and read back by  SOHAN CARBAJAL [id number] 4/14/18 BY RAMONA MANCERA GRAM STAIN BOTTLED 1 CALLED    Critical Value called to and read back by  OLEG NERI [id number] 4/14/18 BY RAMONA MANCERA GRAM STAIN BOTTLE 2 CALLED    Heavy growth  Morganella morganii ssp. morganii  *  Light growth  Escherichia coli  *  Light growth  Staphylococcus aureus  *  Moderate growth  Pseudomonas aeruginosa  *  Plus normal skin jenny.  No growth     Most Recent TSH, T4 and A1c Labs:  Recent Labs   Lab Test  12/16/17   0930   01/06/15   0540   TSH  2.02   < >  6.88*   T4   --    --   1.22    < > = values in this interval not displayed.     Results for orders placed or performed during the hospital encounter of 03/27/18   XR Chest 2 Views    Narrative    PROCEDURE:  XR CHEST 2 VW    HISTORY:  Fever; .     COMPARISON:  8/2/2016, 12/16/2017    FINDINGS:   The cardiac silhouette is normal in size. The pulmonary vasculature is  normal.  The lungs are clear. No pleural effusion or pneumothorax. The  patient's head obscures the right upper lung.      Impression    IMPRESSION:  No acute cardiopulmonary disease.      DELMI COOPER MD   CT Head w/o Contrast    Narrative    PROCEDURE: CT HEAD W/O CONTRAST   3/27/2018 2:44 PM    HISTORY:Male, age,  72 years, , , altered mental status;     COMPARISON: CT scan of the brain 10/11/2016    TECHNIQUE: CT of the brain without  contrast.    FINDINGS: Ventricles and sulci are mildly prominent in size and shape.  Gray and white matter demonstrate scattered areas of decreased  density. There is an area of decreased density extending from the deep  white matter into the cortex in the left frontal region that may be  partially artifactual related to bone/air interface of the left  frontal sinus.    Low dense material now occupies the right maxillary sinus..    There is no evidence of acute fracture.       Impression    IMPRESSION:   1.  No evidence of acute hemorrhage.    2.  Questionable focus of new left frontal cortical/subcortical  lesion. The cortical gray matter and subcortical white matter  demonstrate localized decrease in density, felt to be at least  partially related to artifact. The appearance is however concerning  for a new mass with vasogenic edema in the left frontal lobe. MRI  without with contrast would better characterize.    3.  Low dense material now fills the right maxillary sinus suggesting  mucous retention cyst or perhaps a dentigerous cyst arising from  posterior right maxillary molar.    MATTHEW DEL REAL MD

## 2020-03-10 NOTE — ED NOTES
Lab here to draw 2nd set of blood cultures    Post Acute Skilled Nursing Home follow up Visit Note    Date of Service: 3/10/2020  Location seen at: [id number] [facility]  Subacute / Skilled Need: Rehabilitation    PCP: Arpan Michael DO   Patient Care Team:  Arpan Michael DO as PCP - Chandler Zapata MD as [address] [place] [address] Provider: Physician (Internal Medicine)  Destinee Cooper CNP as [address] [place] [address] Provider: Melissa Ku (Nurse Practitioner - Adult Health)      Sarah Cortes is a 65year old male presenting to [address] [place] [address] for: pt/ot wound care. 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 Issa Casanova for split skin grafting of the plantar surface of the left lateral foot. Hospital course was complicated by the following:  Patient has left foot ulcer, underwent excision of the proximal aspect of the 5th metatarsal shaft with primary closure,  split-thickness skin graft to the plantar aspect of the left lateral foot on 02/20/2020. He also has nonhealing postoperative wound of the 5th metatarsal shaft of the left foot for which he is receiving wound care service. 1 fragment was positive for MRSA, but equivocal since he took 6 days to grow, seen by ID who recommended oral doxycycline. Patient sent here for wound care and physical therapy. Patient seen in his room sitting at bed edge awake and alert in no apparent distress denies any pain or discomfort. 3/10: Patient seen Fabiana Lindo in bed, awake and alert, in NAD, report mild pain L foot to touch or when standing, none at rest. At therapy, he is making some progress, but still unable to do stairs. HISTORY  Past Medical, Social, Surgical, and Family History was reviewed with the patient and was updated, as needed.     ADVANCE DIRECTIVES:  Power of  Status:  Not Activated  Code Status: discussed and advised the patient to complete one  Goals of Care: rehabilitate and prolong survival    ALLERGIES:  Allergies as of 03/10/2020   â¢ (No Known Allergies)       CURRENT MEDICATIONS:   Medications reviewed / reconciled: Yes  SEE hospital DC med list    SEE EMR FOR CHANGES WHILE AT EG     BASELINE FUNCTIONAL STATUS:  RW  CURRENT FUNCTIONAL STATUS:  NWB   REVIEW OF SYSTEMS:  Review of Systems   Constitutional: Positive for activity change (sec to wound ). HENT: Negative. Eyes: Negative. Respiratory: Negative. Cardiovascular: Negative. Gastrointestinal: Negative. Endocrine: Negative. Genitourinary: Negative. Musculoskeletal: Positive for arthralgias. Skin: Positive for wound. Neurological: Negative. Psychiatric/Behavioral: Negative. VITALS:  recorded      PHYSICAL ASSESSMENT:  Physical Exam  Vitals signs reviewed. Constitutional:       Appearance: Normal appearance. He is not ill-appearing. HENT:      Head: Normocephalic and atraumatic. Right Ear: External ear normal.      Left Ear: External ear normal.      Nose: Nose normal.      Mouth/Throat:      Pharynx: Oropharynx is clear. Eyes:      Conjunctiva/sclera: Conjunctivae normal.   Neck:      Musculoskeletal: Normal range of motion and neck supple. Cardiovascular:      Rate and Rhythm: Normal rate and regular rhythm. Pulses: Normal pulses. Heart sounds: Normal heart sounds. Pulmonary:      Effort: Pulmonary effort is normal. No respiratory distress. Breath sounds: Normal breath sounds. No wheezing. Abdominal:      General: Bowel sounds are normal. There is no distension. Palpations: Abdomen is soft. Tenderness: There is no abdominal tenderness. Musculoskeletal: Normal range of motion. Comments: L foot graft--see wound rounds for details    Skin:     General: Skin is warm and dry. Comments: Left lateral foot grafted wound with stiches, dry, intact.    Neurological: General: No focal deficit present. Mental Status: He is alert and oriented to person, place, and time. Mental status is at baseline. Psychiatric:         Mood and Affect: Mood normal.         Behavior: Behavior normal.         Thought Content: Thought content normal.         Judgment: Judgment normal.         ASSESSMENT AND PLAN    1. L foot wound s/p graft /+ MRSA  Continue daily wound dressing  Follow with wound consultant  Complete course of oral doxycycline      2. Debility  Continue daily PT/OT     3. Essential hypertension/CHF  Continue metoprolol, lisinopril and clonidine  Monitor labs and vital signs       4. Diabetes mellitus type 2 with peripheral artery disease  Continue insulin insulin therapy   Continue blood glucose monitoring      5. CAD  Continue medical management    6. COPD  Cont prn bronchodialators      DVT proph-heparin  GI proph-add omeprazole      Total time spent is more than 25 minutes, with more than 50% of the time spent in coordination of care, counseling, review of records and discussion of plan of care with the patient Tameka León

## (undated) DEVICE — PANTIES-MESH L/XL

## (undated) DEVICE — DRSG-KERLIX ROLL 4.5 X 4.1YD

## (undated) DEVICE — TRAY-SKIN PREP POVIDONE/IODINE

## (undated) DEVICE — DRSG-ISLAND 4IN X 5IN

## (undated) DEVICE — CAUTERY-MEGADYNE TIP

## (undated) DEVICE — NDL-BLUNT FILL 18G 1.5"

## (undated) DEVICE — CONTAINER-STERILE 4OZ WRAPPED (OR)

## (undated) DEVICE — DRSG-SPONGE STERILE 4 X 4

## (undated) DEVICE — LIGHT HANDLE COVER

## (undated) DEVICE — IRRIGATION-H2O 1000ML

## (undated) DEVICE — PACK-LAPAROTOMY-CUSTOM

## (undated) DEVICE — CAUTERY PAD-POLYHESIVE II ADULT

## (undated) DEVICE — NDL-25G 1-1/2" NON-SAFETY

## (undated) DEVICE — CAUTERY PENCIL-SMOKE EVACUATION

## (undated) DEVICE — LABEL-STERILE PREPRINTED FOR OR

## (undated) DEVICE — CANISTER-SUCTION 2000CC

## (undated) DEVICE — BDG-ELASTIC 3 INCH

## (undated) DEVICE — BLADE-SCALPEL #15

## (undated) DEVICE — STERILE SLEEVE

## (undated) DEVICE — SUCTION TUBE-YANKAUR

## (undated) DEVICE — SCD SLEEVE-KNEE REG.

## (undated) DEVICE — DRSG-ABDOMINAL 8 X 10

## (undated) DEVICE — GLV-7.5 BIOGEL LATEX

## (undated) DEVICE — TUBING-SUCTION 20FT

## (undated) DEVICE — STERI-STRIP-1/2" X 4"

## (undated) DEVICE — SWABSTICK-BENZOIN

## (undated) DEVICE — IRRIGATION-NACL 1000ML

## (undated) RX ORDER — PROPOFOL 10 MG/ML
INJECTION, EMULSION INTRAVENOUS
Status: DISPENSED
Start: 2018-01-01

## (undated) RX ORDER — LIDOCAINE HYDROCHLORIDE 20 MG/ML
INJECTION, SOLUTION EPIDURAL; INFILTRATION; INTRACAUDAL; PERINEURAL
Status: DISPENSED
Start: 2018-01-01

## (undated) RX ORDER — KETAMINE HYDROCHLORIDE 10 MG/ML
INJECTION, SOLUTION INTRAMUSCULAR; INTRAVENOUS
Status: DISPENSED
Start: 2018-01-01

## (undated) RX ORDER — GLYCOPYRROLATE 0.2 MG/ML
INJECTION, SOLUTION INTRAMUSCULAR; INTRAVENOUS
Status: DISPENSED
Start: 2018-01-01

## (undated) RX ORDER — ONDANSETRON 2 MG/ML
INJECTION INTRAMUSCULAR; INTRAVENOUS
Status: DISPENSED
Start: 2018-01-01

## (undated) RX ORDER — NEOSTIGMINE METHYLSULFATE 1 MG/ML
VIAL (ML) INJECTION
Status: DISPENSED
Start: 2018-01-01

## (undated) RX ORDER — FENTANYL CITRATE 50 UG/ML
INJECTION, SOLUTION INTRAMUSCULAR; INTRAVENOUS
Status: DISPENSED
Start: 2018-01-01